# Patient Record
Sex: FEMALE | Race: WHITE | Employment: FULL TIME | URBAN - METROPOLITAN AREA
[De-identification: names, ages, dates, MRNs, and addresses within clinical notes are randomized per-mention and may not be internally consistent; named-entity substitution may affect disease eponyms.]

---

## 2019-01-24 LAB — LDL-C, EXTERNAL: 111

## 2019-01-30 ENCOUNTER — OFFICE VISIT (OUTPATIENT)
Dept: ORTHOPEDIC SURGERY | Age: 64
End: 2019-01-30

## 2019-01-30 VITALS
SYSTOLIC BLOOD PRESSURE: 123 MMHG | DIASTOLIC BLOOD PRESSURE: 78 MMHG | HEIGHT: 64 IN | BODY MASS INDEX: 27.83 KG/M2 | HEART RATE: 69 BPM | WEIGHT: 163 LBS | OXYGEN SATURATION: 97 %

## 2019-01-30 DIAGNOSIS — M79.642 HAND PAIN, LEFT: Primary | ICD-10-CM

## 2019-01-30 DIAGNOSIS — M25.511 RIGHT SHOULDER PAIN, UNSPECIFIED CHRONICITY: ICD-10-CM

## 2019-01-30 RX ORDER — DEXTROMETHORPHAN HYDROBROMIDE, GUAIFENESIN 5; 100 MG/5ML; MG/5ML
650 LIQUID ORAL EVERY 8 HOURS
COMMUNITY
End: 2020-10-16

## 2019-01-30 RX ORDER — CYCLOBENZAPRINE HCL 10 MG
TABLET ORAL AS NEEDED
COMMUNITY
End: 2021-07-29 | Stop reason: SDUPTHER

## 2019-01-30 RX ORDER — TRIAMCINOLONE ACETONIDE 40 MG/ML
40 INJECTION, SUSPENSION INTRA-ARTICULAR; INTRAMUSCULAR ONCE
Qty: 1 ML | Refills: 0
Start: 2019-01-30 | End: 2019-01-30

## 2019-01-30 RX ORDER — BETAMETHASONE SODIUM PHOSPHATE AND BETAMETHASONE ACETATE 3; 3 MG/ML; MG/ML
6 INJECTION, SUSPENSION INTRA-ARTICULAR; INTRALESIONAL; INTRAMUSCULAR; SOFT TISSUE ONCE
Qty: 1 ML | Refills: 0
Start: 2019-01-30 | End: 2019-01-30

## 2019-01-30 NOTE — PROGRESS NOTES
Patient: Kevin Huerta                MRN: [id number]       SSN: [id number]  YOB: 1955        AGE: 58 y.o. SEX: female  Body mass index is 27.98 kg/m². PCP: Gareth Ann MD  01/30/19    Chief Complaint: Left hand, right shoulder pain    HISTORY OF PRESENT ILLNESS:  Kaela Leavitt is a 58year-old female who presents to the office today with two complaints. She has left hand pain. The pain is located at the base of the thumb. It is worse when she tries to  things. It has gotten worse over the last few days and weeks. She works as a  and she has noted pain in her left hand with this. She also has right shoulder pain. She had a surgery to clean up her shoulder in the 1980s. She continues to have right shoulder pain, as well as locking and catching. She has pain with overhead activities. She has not had any recent injections. She did have an injection in her left thumb previously. Past Medical History:   Diagnosis Date    Arthritis     Hematuria     Kidney stones     Osteoporosis     UTI (urinary tract infection)        History reviewed. No pertinent family history. Current Outpatient Medications   Medication Sig Dispense Refill    cyclobenzaprine (FLEXERIL) 10 mg tablet Take  by mouth three (3) times daily as needed for Muscle Spasm(s).  acetaminophen (TYLENOL ARTHRITIS PAIN) 650 mg TbER Take 650 mg by mouth every eight (8) hours.  betamethasone (CELESTONE SOLUSPAN) 6 mg/mL injection 1 mL by Intra artICUlar route once for 1 dose. 1 mL 0    triamcinolone acetonide (KENALOG) 40 mg/mL injection 1 mL by Intra artICUlar route once for 1 dose. 1 mL 0    gabapentin (NEURONTIN) 300 mg capsule Take 300 mg by mouth three (3) times daily.  levothyroxine (SYNTHROID) 175 mcg tablet Take 175 mcg by mouth Daily (before breakfast).  conjugated estrogens (PREMARIN) 0.625 mg/gram vaginal cream Insert 0.5 g into vagina daily.       calcium carbonate (CALTREX) 600 mg (1,500 mg) tablet Take 600 mg by mouth two (2) times a day.  Dexlansoprazole (DEXILANT) 60 mg CpDB Take  by mouth.  oxybutynin (DITROPAN) 5 mg tablet Take 5 mg by mouth three (3) times daily. Allergies   Allergen Reactions    Amitriptyline Other (comments)    Aspirin Other (comments)    Demerol [Meperidine] Other (comments)       Past Surgical History:   Procedure Laterality Date    HX APPENDECTOMY      HX BLADDER SUSPENSION      HX CHOLECYSTECTOMY      HX HYSTERECTOMY         Social History     Socioeconomic History    Marital status: UNKNOWN     Spouse name: Not on file    Number of children: Not on file    Years of education: Not on file    Highest education level: Not on file   Social Needs    Financial resource strain: Not on file    Food insecurity - worry: Not on file    Food insecurity - inability: Not on file   [employer] needs - medical: Not on file   [employer] needs - non-medical: Not on file   Occupational History    Not on file   Tobacco Use    Smoking status: Never Smoker   Substance and Sexual Activity    Alcohol use: No    Drug use: Not on file    Sexual activity: Not on file   Other Topics Concern    Not on file   Social History Narrative    Not on file       REVIEW OF SYSTEMS:      CON: negative for recent weight loss/gain, fever, or chills  EYE: negative for double or blurry vision  ENT: negative for hoarseness  RS:   negative for cough, URI, SOB  CV:  negative for chest pain, palpitations  GI:    negative for blood in stool, nausea/vomiting  :  negative for blood in urine  MS: As per HPI  Other systems reviewed and noted below. PHYSICAL EXAMINATION:  Visit Vitals  /78   Pulse 69   Ht 5' 4\" (1.626 m)   Wt 163 lb (73.9 kg)   SpO2 97%   BMI 27.98 kg/m²     Body mass index is 27.98 kg/m². GENERAL: Alert and oriented x3, in no acute distress, well-developed, well-nourished.   HEENT: Normocephalic, atraumatic. RESP: Non labored breathing with equal chest rise on inspiration. CV: Well perfused extremities. No cyanosis or clubbing noted. ABDOMEN: Soft, non-tender, non-distended. PHYSICAL EXAMINATION:   Physical exam of the left hand with swelling at the base of the thumb. She is tender to palpation. She has a positive grind test.  She has crepitus with thumb range of motion. She is neurovascularly intact distally. Right shoulder examination with decreased range of motion with forward flexion only to about 120 degrees. External rotation of 30 degrees. Internal rotation to the posterolateral buttock. She has pain with this as well. Gentle rotator cuff strength testing also does give her some pain without weakness. IMAGING:   X-rays of the right shoulder and left hand were taken in the office today. The left hand shows CMC arthritis. The right shoulder shows AC arthrosis, as well as mild arthritic changes in her glenohumeral joint. ASSESSMENT/PLAN:   Juan Jacobs is a 58year-old female with left thumb CMC arthritis, as well as right shoulder osteoarthritis. We discussed treatment options at length. She would like to try injections. Both of these joints were injected. She tolerated it well. Follow up as needed.            [facility] - [facility]  OFFICE PROCEDURE PROGRESS NOTE        Chart reviewed for the following:   João Whittington MD, have reviewed the History, Physical and updated the Allergic reactions for Patricia J Colon     TIME OUT performed immediately prior to start of procedure:   IMassimo MD, have performed the following reviews on Patricia J Colon prior to the start of the procedure:            * Patient was identified by name and date of birth   * Agreement on procedure being performed was verified  * Risks and Benefits explained to the patient  * Procedure site verified and marked as necessary  * Patient was positioned for comfort  * Consent was signed and verified    Time: 1200      Date of procedure: 1/30/2019    Procedure performed by:  Magdalena Osman MD    Provider assisted by: Mir Ordonez LPN    Patient assisted by: self    How tolerated by patient: tolerated the procedure well with no complications    Post Procedural Pain Scale: 0 - No Hurt    Comments: none                  Electronically signed by: Magdalena Osman MD

## 2019-05-16 ENCOUNTER — OFFICE VISIT (OUTPATIENT)
Dept: CARDIOLOGY CLINIC | Age: 64
End: 2019-05-16

## 2019-05-16 VITALS
HEIGHT: 64 IN | WEIGHT: 158 LBS | SYSTOLIC BLOOD PRESSURE: 114 MMHG | BODY MASS INDEX: 26.98 KG/M2 | DIASTOLIC BLOOD PRESSURE: 77 MMHG | HEART RATE: 83 BPM

## 2019-05-16 DIAGNOSIS — R94.31 ABNORMAL EKG: ICD-10-CM

## 2019-05-16 DIAGNOSIS — R07.9 CHEST PAIN, UNSPECIFIED TYPE: Primary | ICD-10-CM

## 2019-05-16 DIAGNOSIS — R01.1 MURMUR, CARDIAC: ICD-10-CM

## 2019-05-16 RX ORDER — OMEPRAZOLE 20 MG/1
40 CAPSULE, DELAYED RELEASE ORAL DAILY
COMMUNITY

## 2019-05-16 RX ORDER — AMLODIPINE BESYLATE 2.5 MG/1
2.5 TABLET ORAL DAILY
Qty: 30 TAB | Refills: 1 | Status: SHIPPED | OUTPATIENT
Start: 2019-05-16 | End: 2019-06-14

## 2019-05-16 RX ORDER — ASPIRIN 81 MG/1
TABLET ORAL DAILY
COMMUNITY

## 2019-05-16 RX ORDER — NITROGLYCERIN 0.4 MG/1
0.4 TABLET SUBLINGUAL
Qty: 25 TAB | Refills: 0 | Status: SHIPPED | OUTPATIENT
Start: 2019-05-16 | End: 2020-08-03

## 2019-05-16 NOTE — PROGRESS NOTES
HISTORY OF PRESENT ILLNESS  Jeremy Patel is a 58 y.o. female. Chest Pain (Angina)    The history is provided by the patient. This is a new problem. The current episode started more than 1 week ago (11/18). The problem occurs every several days. The pain is associated with exertion. The pain is present in the substernal region. The quality of the pain is described as sharp. The pain radiates to the right arm (right chest/right back). Associated symptoms include dizziness, lower extremity edema and shortness of breath. Pertinent negatives include no claudication, no cough, no diaphoresis, no fever, no headaches, no malaise/fatigue, no nausea, no orthopnea, no palpitations, no PND and no vomiting. She has tried rest for the symptoms. The treatment provided significant relief. Leg Swelling   The history is provided by the patient. This is a chronic problem. The current episode started more than 1 week ago (2017). The problem occurs daily. Associated symptoms include chest pain and shortness of breath. Pertinent negatives include no headaches. The symptoms are aggravated by standing. The symptoms are relieved by sleep. Dizziness   The history is provided by the patient. This is a new problem. The current episode started more than 1 week ago (11/18). Associated symptoms include chest pain and shortness of breath. Pertinent negatives include no headaches. The symptoms are aggravated by standing and walking. The symptoms are relieved by rest.       Review of Systems   Constitutional: Negative for chills, diaphoresis, fever, malaise/fatigue and weight loss. HENT: Negative for nosebleeds. Eyes: Negative for discharge. Respiratory: Positive for shortness of breath. Negative for cough and wheezing. Cardiovascular: Positive for chest pain and leg swelling. Negative for palpitations, orthopnea, claudication and PND. Gastrointestinal: Negative for diarrhea, nausea and vomiting.    Genitourinary: Negative for dysuria and hematuria. Musculoskeletal: Negative for joint pain. Skin: Negative for rash. Neurological: Positive for dizziness. Negative for seizures, loss of consciousness and headaches. Endo/Heme/Allergies: Negative for polydipsia. Does not bruise/bleed easily. Psychiatric/Behavioral: Negative for depression and substance abuse. The patient does not have insomnia. Allergies   Allergen Reactions    Amitriptyline Other (comments)    Aspirin Other (comments)    Demerol [Meperidine] Other (comments)       Past Medical History:   Diagnosis Date    Arthritis     Hematuria     Kidney stones     Osteoporosis     Thyroid disease     UTI (urinary tract infection)        Family History   Problem Relation Age of Onset    Other Mother         irregular heart beat    Stroke Father 61    Stroke Sister 72    Heart Surgery Sister 76    Coronary Artery Disease Sister        Social History     Tobacco Use    Smoking status: Never Smoker    Smokeless tobacco: Never Used   Substance Use Topics    Alcohol use: No    Drug use: Never        Current Outpatient Medications   Medication Sig    omeprazole (PRILOSEC) 20 mg capsule Take 20 mg by mouth daily.  aspirin delayed-release 81 mg tablet Take  by mouth daily.  lactobacillus combination no.9 (ADULT 50+ PROBIOTIC PO) Take  by mouth.  cyclobenzaprine (FLEXERIL) 10 mg tablet Take  by mouth three (3) times daily as needed for Muscle Spasm(s).  acetaminophen (TYLENOL ARTHRITIS PAIN) 650 mg TbER Take 650 mg by mouth every eight (8) hours.  gabapentin (NEURONTIN) 300 mg capsule Take 300 mg by mouth three (3) times daily.  levothyroxine (SYNTHROID) 175 mcg tablet Take 175 mcg by mouth Daily (before breakfast).  conjugated estrogens (PREMARIN) 0.625 mg/gram vaginal cream Insert 0.5 g into vagina daily. No current facility-administered medications for this visit.          Past Surgical History:   Procedure Laterality Date    HX APPENDECTOMY      HX BLADDER SUSPENSION      HX CHOLECYSTECTOMY      HX HYSTERECTOMY         Visit Vitals  /77 (BP 1 Location: Left arm, BP Patient Position: Standing)   Pulse 83   Ht 5' 4\" (1.626 m)   Wt 71.7 kg (158 lb)   BMI 27.12 kg/m²       Diagnostic Studies:  I have reviewed the relevant tests done on the patient and show as follows  EKG tracings reviewed by me today. EKG Results     Procedure 720 Value Units Date/Time    AMB POC EKG ROUTINE W/ 12 LEADS, INTER & REP [[id number]] Resulted:  05/16/19 1331    Order Status:  Completed Updated:  05/16/19 1324        XR Results (most recent):  No results found for this or any previous visit. No flowsheet data found. Ms. Jana Blakely has a reminder for a \"due or due soon\" health maintenance. I have asked that she contact her primary care provider for follow-up on this health maintenance. Physical Exam   Constitutional: She is oriented to person, place, and time. She appears well-developed and well-nourished. No distress. HENT:   Head: Normocephalic and atraumatic. Mouth/Throat: Normal dentition. Eyes: Right eye exhibits no discharge. Left eye exhibits no discharge. No scleral icterus. Neck: Neck supple. No JVD present. Carotid bruit is not present. No thyromegaly present. Cardiovascular: Normal rate, regular rhythm, S1 normal, S2 normal and intact distal pulses. Exam reveals no gallop and no friction rub. Murmur heard. High-pitched blowing mid to late systolic murmur is present with a grade of 3/6 at the apex. Pulmonary/Chest: Effort normal and breath sounds normal. She has no wheezes. She has no rales. Abdominal: Soft. She exhibits no mass. There is no tenderness. Musculoskeletal: She exhibits no edema. Lymphadenopathy:        Right cervical: No superficial cervical adenopathy present. Left cervical: No superficial cervical adenopathy present. Neurological: She is alert and oriented to person, place, and time.    Skin: Skin is warm and dry. No rash noted. Psychiatric: She has a normal mood and affect. Her behavior is normal.       ASSESSMENT and PLAN    Patient presenting with new unstable angina. She gets fairly frequent chest pain episodes and is scared. I will order the work-up as outpatient but advised her to go to emergency room if there is any severe pain. Will add Norvasc and as needed nitroglycerin. Diagnoses and all orders for this visit:    1. Chest pain, unspecified type  -     AMB POC EKG ROUTINE W/ 12 LEADS, INTER & REP  -     ECHO ADULT COMPLETE; Future  -     NUCLEAR CARDIAC STRESS TEST; Future  -     LIPID PANEL; Future  -     METABOLIC PANEL, BASIC; Future  -     CBC W/O DIFF; Future  -     HEPATIC FUNCTION PANEL; Future  -     TSH 3RD GENERATION; Future  -     nitroglycerin (NITROSTAT) 0.4 mg SL tablet; 1 Tab by SubLINGual route every five (5) minutes as needed for Chest Pain for up to 3 doses. -     amLODIPine (NORVASC) 2.5 mg tablet; Take 1 Tab by mouth daily. 2. Murmur, cardiac  -     ECHO ADULT COMPLETE; Future    3. Abnormal EKG  -     NUCLEAR CARDIAC STRESS TEST; Future        Pertinent laboratory and test data reviewed and discussed with patient.   See patient instructions also for other medical advice given    Medications Discontinued During This Encounter   Medication Reason    calcium carbonate (CALTREX) 600 mg (1,500 mg) tablet Therapy Completed    oxybutynin (DITROPAN) 5 mg tablet Therapy Completed    Dexlansoprazole (DEXILANT) 60 mg CpDB        Follow-up and Dispositions    · Return in about 2 weeks (around 5/30/2019), or if symptoms worsen or fail to improve, for post test.

## 2019-05-16 NOTE — PATIENT INSTRUCTIONS
Medications Discontinued During This Encounter   Medication Reason    calcium carbonate (CALTREX) 600 mg (1,500 mg) tablet Therapy Completed    oxybutynin (DITROPAN) 5 mg tablet Therapy Completed    Dexlansoprazole (DEXILANT) 60 mg CpDB           A Healthy Heart: Care Instructions  Your Care Instructions    Heart disease occurs when a substance called plaque builds up in the vessels that supply oxygen-rich blood to your heart. This can narrow the blood vessels and reduce blood flow. A heart attack happens when blood flow is completely blocked. A high-fat diet, smoking, and other factors increase the risk of heart disease. Your doctor has found that you have a chance of having heart disease. You can do lots of things to keep your heart healthy. It may not be easy, but you can change your diet, exercise more, and quit smoking. These steps really work to lower your chance of heart disease. Follow-up care is a key part of your treatment and safety. Be sure to make and go to all appointments, and call your doctor if you are having problems. It's also a good idea to know your test results and keep a list of the medicines you take. How can you care for yourself at home? Diet    · Use less salt when you cook and eat. This helps lower your blood pressure. Taste food before salting. Add only a little salt when you think you need it. With time, your taste buds will adjust to less salt.     · Eat fewer snack items, fast foods, canned soups, and other high-salt, high-fat, processed foods.     · Read food labels and try to avoid saturated and trans fats. They increase your risk of heart disease by raising cholesterol levels.     · Limit the amount of solid fat-butter, margarine, and shortening-you eat. Use olive, peanut, or canola oil when you cook. Bake, broil, and steam foods instead of frying them.     · Eating fish can lower your risk for heart disease. Eat at least 2 servings of fish a week.  [place], mackerel, herring, sardines, and chunk light tuna are very good choices. These fish contain omega-3 fatty acids.     · Eat a variety of fruit and vegetables every day. Dark green, deep orange, red, or yellow fruits and vegetables are especially good for you. Examples include spinach, carrots, peaches, and berries.     · Foods high in fiber can reduce your cholesterol and provide important vitamins and minerals. High-fiber foods include whole-grain cereals and breads, oatmeal, beans, brown rice, citrus fruits, and apples.     · Limit drinks and foods with added sugar. These include candy, desserts, and soda pop.    Lifestyle changes    · If your doctor recommends it, get more exercise. Walking is a good choice. Bit by bit, increase the amount you walk every day. Try for at least 30 minutes on most days of the week. You also may want to swim, bike, or do other activities.     · Do not smoke. If you need help quitting, talk to your doctor about stop-smoking programs and medicines. These can increase your chances of quitting for good. Quitting smoking may be the most important step you can take to protect your heart. It is never too late to quit. You will get health benefits right away.     · Limit alcohol to 2 drinks a day for men and 1 drink a day for women. Too much alcohol can cause health problems. Medicines    · Take your medicines exactly as prescribed. Call your doctor if you think you are having a problem with your medicine.     · If your doctor recommends aspirin, take the amount directed each day. Make sure you take aspirin and not another kind of pain reliever, such as acetaminophen (Tylenol). If you take ibuprofen (such as Advil or Motrin) for other problems, take aspirin at least 2 hours before taking ibuprofen. When should you call for help? Call 911 if you have symptoms of a heart attack.  These may include:    · Chest pain or pressure, or a strange feeling in the chest.     · Sweating.     · Shortness of breath.     · Pain, pressure, or a strange feeling in the back, neck, jaw, or upper belly or in one or both shoulders or arms.     · Lightheadedness or sudden weakness.     · A fast or irregular heartbeat.    After you call 911, the  may tell you to chew 1 adult-strength or 2 to 4 low-dose aspirin. Wait for an ambulance. Do not try to drive yourself.   Watch closely for changes in your health, and be sure to contact your doctor if you have any problems. Where can you learn more? Go to http://louise-martine.info/. Enter R889 in the search box to learn more about \"A Healthy Heart: Care Instructions. \"  Current as of: 2018  Content Version: 11.9  © 2249-0244 [employer]. Care instructions adapted under license by [employer] (which disclaims liability or warranty for this information). If you have questions about a medical condition or this instruction, always ask your healthcare professional. [place] [address] any warranty or liability for your use of this information. Quippi Activation    Thank you for requesting access to Quippi. Please follow the instructions below to securely access and download your online medical record. Quippi allows you to send messages to your doctor, view your test results, renew your prescriptions, schedule appointments, and more. How Do I Sign Up? 1. In your internet browser, go to [email or web address]. 2. Click on the First Time User? Click Here link in the Sign In box. You will see the New Member Sign Up page. 3. Enter your Quippi Access Code exactly as it appears below. You will not need to use this code after youve completed the sign-up process. If you do not sign up before the expiration date, you must request a new code. Quippi Access Code: [email or web address]  Expires: 2019  1:56 PM (This is the date your Quippi access code will )    4.  Enter the last four digits of your Social Security Number (xxxx) and Date of Birth (mm/dd/yyyy) as indicated and click Submit. You will be taken to the next sign-up page. 5. Create a LearnUp ID. This will be your LearnUp login ID and cannot be changed, so think of one that is secure and easy to remember. 6. Create a LearnUp password. You can change your password at any time. 7. Enter your Password Reset Question and Answer. This can be used at a later time if you forget your password. 8. Enter your e-mail address. You will receive e-mail notification when new information is available in [address] 9. Click Sign Up. You can now view and download portions of your medical record. 10. Click the Download Summary menu link to download a portable copy of your medical information. Additional Information    If you have questions, please visit the Frequently Asked Questions section of the LearnUp website at [email or web address] Remember, LearnUp is NOT to be used for urgent needs. For medical emergencies, dial 911.

## 2019-06-13 LAB
A-G RATIO,AGRAT: 2 RATIO (ref 1.1–2.6)
ALBUMIN SERPL-MCNC: 4.7 G/DL (ref 3.5–5)
ALP SERPL-CCNC: 61 U/L (ref 40–120)
ALT SERPL-CCNC: 11 U/L (ref 5–40)
ANION GAP SERPL CALC-SCNC: 10.4 MMOL/L
AST SERPL W P-5'-P-CCNC: 15 U/L (ref 10–37)
BILIRUB SERPL-MCNC: 1 MG/DL (ref 0.2–1.2)
BILIRUBIN, DIRECT,CBIL: <0.2 MG/DL (ref 0–0.3)
BUN SERPL-MCNC: 17 MG/DL (ref 6–22)
CALCIUM SERPL-MCNC: 9.4 MG/DL (ref 8.4–10.5)
CHLORIDE SERPL-SCNC: 109 MMOL/L (ref 98–110)
CHOLEST SERPL-MCNC: 173 MG/DL (ref 110–200)
CO2 SERPL-SCNC: 26 MMOL/L (ref 20–32)
CREAT SERPL-MCNC: 0.5 MG/DL (ref 0.8–1.4)
ERYTHROCYTE [DISTWIDTH] IN BLOOD BY AUTOMATED COUNT: 11.5 % (ref 10–15.5)
GFRAA, 66117: >60
GFRNA, 66118: >60
GLOBULIN,GLOB: 2.4 G/DL (ref 2–4)
GLUCOSE SERPL-MCNC: 96 MG/DL (ref 70–99)
HCT VFR BLD AUTO: 39.1 % (ref 35.1–48)
HDLC SERPL-MCNC: 34 MG/DL (ref 40–59)
HDLC SERPL-MCNC: 5.1 MG/DL (ref 0–5)
HGB BLD-MCNC: 13 G/DL (ref 11.7–16)
LDLC SERPL CALC-MCNC: 125 MG/DL (ref 50–99)
MCH RBC QN AUTO: 31 PG (ref 26–34)
MCHC RBC AUTO-ENTMCNC: 33 G/DL (ref 31–36)
MCV RBC AUTO: 92 FL (ref 80–95)
PLATELET # BLD AUTO: 249 K/UL (ref 140–440)
PMV BLD AUTO: 9.5 FL (ref 9–13)
POTASSIUM SERPL-SCNC: 3.8 MMOL/L (ref 3.5–5.5)
PROT SERPL-MCNC: 7.1 G/DL (ref 6.2–8.1)
RBC # BLD AUTO: 4.25 M/UL (ref 3.8–5.2)
SODIUM SERPL-SCNC: 145 MMOL/L (ref 133–145)
TRIGL SERPL-MCNC: 71 MG/DL (ref 40–149)
TSH SERPL DL<=0.005 MIU/L-ACNC: 0.32 MCU/ML (ref 0.27–4.2)
VLDLC SERPL CALC-MCNC: 14 MG/DL (ref 8–30)
WBC # BLD AUTO: 5.1 K/UL (ref 4–11)

## 2019-06-14 ENCOUNTER — OFFICE VISIT (OUTPATIENT)
Dept: CARDIOLOGY CLINIC | Age: 64
End: 2019-06-14

## 2019-06-14 ENCOUNTER — TELEPHONE (OUTPATIENT)
Dept: CARDIOLOGY CLINIC | Age: 64
End: 2019-06-14

## 2019-06-14 VITALS
DIASTOLIC BLOOD PRESSURE: 71 MMHG | HEART RATE: 72 BPM | HEIGHT: 64 IN | WEIGHT: 155 LBS | SYSTOLIC BLOOD PRESSURE: 123 MMHG | BODY MASS INDEX: 26.46 KG/M2

## 2019-06-14 DIAGNOSIS — I34.0 NON-RHEUMATIC MITRAL REGURGITATION: Primary | ICD-10-CM

## 2019-06-14 DIAGNOSIS — E78.00 HYPERCHOLESTEREMIA: ICD-10-CM

## 2019-06-14 DIAGNOSIS — R07.9 CHEST PAIN, UNSPECIFIED TYPE: ICD-10-CM

## 2019-06-14 DIAGNOSIS — I20.8 ATYPICAL ANGINA (HCC): ICD-10-CM

## 2019-06-14 RX ORDER — MAG HYDROX/ALUMINUM HYD/SIMETH 200-200-20
SUSPENSION, ORAL (FINAL DOSE FORM) ORAL AS NEEDED
COMMUNITY
End: 2020-10-16

## 2019-06-14 RX ORDER — AMLODIPINE BESYLATE 2.5 MG/1
2.5 TABLET ORAL DAILY
Qty: 30 TAB | Refills: 1
Start: 2019-06-14 | End: 2019-07-18 | Stop reason: SDUPTHER

## 2019-06-14 RX ORDER — DICLOFENAC SODIUM 10 MG/G
GEL TOPICAL AS NEEDED
COMMUNITY
End: 2021-07-31 | Stop reason: ALTCHOICE

## 2019-06-14 RX ORDER — ATORVASTATIN CALCIUM 10 MG/1
10 TABLET, FILM COATED ORAL DAILY
Qty: 30 TAB | Refills: 5 | Status: SHIPPED | OUTPATIENT
Start: 2019-06-14 | End: 2019-11-27 | Stop reason: SDUPTHER

## 2019-06-14 NOTE — TELEPHONE ENCOUNTER
----- Message from Amber Santos MD sent at 6/14/2019 11:33 AM EDT -----  Please contact patient and do the following asap    Start Lipitor 10 mg a day  Get fasting Lipid profile and LFTs in 6 wks. Please reinforce diet and exercise.

## 2019-06-14 NOTE — PROGRESS NOTES
1. Have you been to the ER, urgent care clinic since your last visit? Hospitalized since your last visit?     no    2. Have you seen or consulted any other health care providers outside of the [address] [place] [address] since your last visit? Include any pap smears or colon screening. No     3. Since your last visit, have you had any of the following symptoms? no         4. Have you had any blood work, X-rays or cardiac testing? No         5. Where do you normally have your labs drawn? 6. Do you need any refills today?    no

## 2019-06-14 NOTE — PATIENT INSTRUCTIONS
Medications Discontinued During This Encounter   Medication Reason    lactobacillus combination no.9 (ADULT 50+ PROBIOTIC PO) Duplicate Order    amLODIPine (NORVASC) 2.5 mg tablet           Mitral Valve Regurgitation: Care Instructions  Your Care Instructions    The mitral valve lets blood flow from the upper to lower areas of the heart. Mitral valve regurgitation occurs when the valve cannot close all the way and blood backs up (regurgitates) into the upper area of the heart. This causes the heart to work harder to pump the extra blood. Mild regurgitation causes few problems. Many people have it for many years without having problems. But if the regurgitation is severe, it can weaken the heart and lead to heart failure. The causes of mitral valve regurgitation include a heart attack, heart infection (endocarditis), mitral valve prolapse, cardiomyopathy, calcium buildup in the heart, the weight-loss medicine Fen-Phen, and diseases such as lupus and rheumatoid arthritis. Some people are born with the valve problem. Your doctor may just want to watch your health closely if you have mild mitral valve regurgitation. You may take medicine to treat a problem that is causing the regurgitation. Or you may take medicine for other health problems that are caused by mitral regurgitation. For more severe disease, the valve may need to be repaired or replaced. Follow-up care is a key part of your treatment and safety. Be sure to make and go to all appointments, and call your doctor if you are having problems. It's also a good idea to know your test results and keep a list of the medicines you take. How can you care for yourself at home? · Be safe with medicines. Take your medicines exactly as prescribed. Call your doctor if you think you are having a problem with your medicine. You will get more details on the specific medicines your doctor prescribes.   · Eat heart-healthy foods such as fruits, vegetables, whole grains, fish, lean meats, and low-fat or nonfat dairy foods. Limit sodium, sugar, and alcohol. · Be active. Ask your doctor what type and level of exercise is safe for you. Walking is a good choice. You also may want to swim, bike, or do other activities. Let your doctor know if your ability to exercise changes. · Do not smoke. If you need help quitting, talk to your doctor about stop-smoking programs and medicines. These can increase your chances of quitting for good. · Stay at a healthy weight. Lose weight if you need to. · Avoid colds and flu. Get a pneumococcal vaccine shot. If you have had one before, ask your doctor if you need another dose. Get a flu vaccine every year. · Take care of your teeth and gums. Get regular dental checkups. Good dental health is important because bacteria can spread from infected teeth and gums to the heart valves. When should you call for help? Call 911 anytime you think you may need emergency care. For example, call if:    · You have severe trouble breathing.     · You cough up pink, foamy mucus.     · You have symptoms of a heart attack. These may include:  ? Chest pain or pressure, or a strange feeling in the chest.  ? Sweating. ? Shortness of breath. ? Nausea or vomiting. ? Pain, pressure, or a strange feeling in the back, neck, jaw, or upper belly or in one or both shoulders or arms. ? Lightheadedness or sudden weakness. ? A fast or irregular heartbeat. After you call 911, the  may tell you to chew 1 adult-strength or 2 to 4 low-dose aspirin. Wait for an ambulance. Do not try to drive yourself.    Call your doctor now or seek immediate medical care if:    · You have new or increased shortness of breath.     · You are dizzy or lightheaded, or you feel like you may faint.     · You have sudden weight gain, such as more than 2 to 3 pounds in a day or 5 pounds in a week.  (Your doctor may suggest a different range of weight gain.)     · You have increased swelling in your legs, ankles, or feet.     · You are suddenly so tired or weak that you cannot do your usual activities.    Watch closely for changes in your health, and be sure to contact your doctor if you develop new symptoms. Where can you learn more? Go to http://louise-martine.info/. Enter G003 in the search box to learn more about \"Mitral Valve Regurgitation: Care Instructions. \"  Current as of: 2018  Content Version: 11.9  © 7580-5280 [employer]. Care instructions adapted under license by [employer] (which disclaims liability or warranty for this information). If you have questions about a medical condition or this instruction, always ask your healthcare professional. [address] any warranty or liability for your use of this information. alike Activation    Thank you for requesting access to alike. Please follow the instructions below to securely access and download your online medical record. alike allows you to send messages to your doctor, view your test results, renew your prescriptions, schedule appointments, and more. How Do I Sign Up? 1. In your internet browser, go to [email or web address]. 2. Click on the First Time User? Click Here link in the Sign In box. You will see the New Member Sign Up page. 3. Enter your alike Access Code exactly as it appears below. You will not need to use this code after youve completed the sign-up process. If you do not sign up before the expiration date, you must request a new code. alike Access Code: [email or web address]  Expires: 2019  1:56 PM (This is the date your alike access code will )    4. Enter the last four digits of your Social Security Number (xxxx) and Date of Birth (mm/dd/yyyy) as indicated and click Submit. You will be taken to the next sign-up page. 5. Create a alike ID.  This will be your alike login ID and cannot be changed, so think of one that is secure and easy to remember. 6. Create a [employer] password. You can change your password at any time. 7. Enter your Password Reset Question and Answer. This can be used at a later time if you forget your password. 8. Enter your e-mail address. You will receive e-mail notification when new information is available in [address] 9. Click Sign Up. You can now view and download portions of your medical record. 10. Click the Download Summary menu link to download a portable copy of your medical information. Additional Information    If you have questions, please visit the Frequently Asked Questions section of the [employer] website at [email or web address] Remember, [employer] is NOT to be used for urgent needs. For medical emergencies, dial 911.

## 2019-06-14 NOTE — PROGRESS NOTES
Please contact patient and do the following asap    Start Lipitor 10 mg a day  Get fasting Lipid profile and LFTs in 6 wks. Please reinforce diet and exercise.

## 2019-06-14 NOTE — PROGRESS NOTES
HISTORY OF PRESENT ILLNESS  Magdiel Scott is a 58 y.o. female. Chest Pain (Angina)    The history is provided by the patient. This is a new problem. The current episode started more than 1 week ago (11/18). The problem has been gradually improving. The problem occurs every several days. The pain is associated with exertion. The pain is present in the substernal region. The quality of the pain is described as sharp. The pain radiates to the right arm (right chest/right back). Associated symptoms include lower extremity edema. Pertinent negatives include no claudication, no cough, no diaphoresis, no dizziness, no fever, no malaise/fatigue, no nausea, no orthopnea, no palpitations, no PND and no vomiting. She has tried rest for the symptoms. The treatment provided significant relief. Leg Swelling   The history is provided by the patient. This is a chronic problem. The current episode started more than 1 week ago (2017). The problem occurs daily. The problem has not changed since onset. Associated symptoms include chest pain. The symptoms are aggravated by standing. The symptoms are relieved by sleep. Dizziness   The history is provided by the patient. This is a new problem. The current episode started more than 1 week ago (11/18). The problem has been resolved. Associated symptoms include chest pain. The symptoms are aggravated by standing and walking. The symptoms are relieved by rest.       Review of Systems   Constitutional: Negative for chills, diaphoresis, fever, malaise/fatigue and weight loss. HENT: Negative for nosebleeds. Eyes: Negative for discharge. Respiratory: Negative for cough and wheezing. Cardiovascular: Positive for chest pain and leg swelling. Negative for palpitations, orthopnea, claudication and PND. Gastrointestinal: Negative for diarrhea, nausea and vomiting. Genitourinary: Negative for dysuria and hematuria. Musculoskeletal: Negative for joint pain.    Skin: Negative for rash.   Neurological: Negative for dizziness, seizures and loss of consciousness. Endo/Heme/Allergies: Negative for polydipsia. Does not bruise/bleed easily. Psychiatric/Behavioral: Negative for depression and substance abuse. The patient does not have insomnia. Allergies   Allergen Reactions    Amitriptyline Other (comments)    Aspirin Other (comments)    Demerol [Meperidine] Other (comments)       Past Medical History:   Diagnosis Date    Arthritis     Hematuria     Kidney stones     Osteoporosis     Thyroid disease     UTI (urinary tract infection)        Family History   Problem Relation Age of Onset    Other Mother         irregular heart beat    Stroke Father 61    Stroke Sister 72    Heart Surgery Sister 76    Coronary Artery Disease Sister        Social History     Tobacco Use    Smoking status: Never Smoker    Smokeless tobacco: Never Used   Substance Use Topics    Alcohol use: No    Drug use: Never        Current Outpatient Medications   Medication Sig    diclofenac (VOLTAREN) 1 % gel Apply  to affected area as needed.  L.acidophilus/B. bifidum,longum (PROBIOTIC COLON SUPPORT PO) Take  by mouth as needed.  hydrocortisone (HYCORT) 1 % ointment Apply  to affected area as needed for Skin Irritation. use thin layer    multivit-min/folic acid/vit K1 (MULTI FOR HER 50 PLUS PO) Take  by mouth.  omeprazole (PRILOSEC) 20 mg capsule Take 20 mg by mouth daily.  aspirin delayed-release 81 mg tablet Take  by mouth daily.  cyclobenzaprine (FLEXERIL) 10 mg tablet Take  by mouth as needed for Muscle Spasm(s).  acetaminophen (TYLENOL ARTHRITIS PAIN) 650 mg TbER Take 650 mg by mouth every eight (8) hours.  gabapentin (NEURONTIN) 300 mg capsule Take 300 mg by mouth three (3) times daily.  levothyroxine (SYNTHROID) 175 mcg tablet Take 175 mcg by mouth Daily (before breakfast).  conjugated estrogens (PREMARIN) 0.625 mg/gram vaginal cream Insert 0.5 g into vagina daily.     nitroglycerin (NITROSTAT) 0.4 mg SL tablet 1 Tab by SubLINGual route every five (5) minutes as needed for Chest Pain for up to 3 doses. No current facility-administered medications for this visit. Past Surgical History:   Procedure Laterality Date    HX APPENDECTOMY      HX BLADDER SUSPENSION      HX CHOLECYSTECTOMY      HX HYSTERECTOMY         Visit Vitals  /71   Pulse 72   Ht 5' 4\" (1.626 m)   Wt 70.3 kg (155 lb)   BMI 26.61 kg/m²       Diagnostic Studies:  I have reviewed the relevant tests done on the patient and show as follows  EKG tracings reviewed by me today. EKG Results     None        XR Results (most recent):  No results found for this or any previous visit. No flowsheet data found. 5/19 ECHO  Interpretation Summary        · Left Ventricle: Mild concentric hypertrophy. Estimated left ventricular ejection fraction is 56 - 60%. No regional wall motion abnormality noted. Mild (grade 1) left ventricular diastolic dysfunction. · Normal right ventricular size and function. · Left Atrium: Moderately dilated left atrium. · Mitral Valve: Mitral valve thickening. Mild mitral valve regurgitation. · Tricuspid Valve: Mild tricuspid valve regurgitation is present. There is no evidence of pulmonary hypertension.               Comparison Study Information     Prior Study     There is a prior study available for comparison that was performed on 5/21/2015. As compared to the previous study, there are no significant changes. 5/19 Nuclear Stress Test     Normal myocardial perfusion imaging. Myocardial perfusion imaging supports a low risk stress test.   There is a prior study available for comparison. Interpretation Summary     · Baseline ECG: Sinus rhythm, non-specific ST-T wave abnormalities. First degree AV block  · Gated SPECT: Left ventricular function post-stress was normal. Calculated ejection fraction is 72%. There is no evidence of transient ischemic dilation (TID). The TID ratio is 1.06.  · Inconclusive stress test.  · Left ventricular perfusion is probably normal.  · Myocardial perfusion imaging defect 1: There is a defect that is small to moderate in size with a mild reduction in uptake present in the apical to basal inferior location(s) that is non-reversible. There is normal wall motion in the defect area. Viability in the area is good. The defect appears to be an artifact caused by soft tissue. · Normal myocardial perfusion imaging. Myocardial perfusion imaging supports a low risk stress test.          Ms. Beata Oakes has a reminder for a \"due or due soon\" health maintenance. I have asked that she contact her primary care provider for follow-up on this health maintenance. Physical Exam   Constitutional: She is oriented to person, place, and time. She appears well-developed and well-nourished. No distress. HENT:   Head: Normocephalic and atraumatic. Mouth/Throat: Normal dentition. Eyes: Right eye exhibits no discharge. Left eye exhibits no discharge. No scleral icterus. Neck: Neck supple. No JVD present. Carotid bruit is not present. No thyromegaly present. Cardiovascular: Normal rate, regular rhythm, S1 normal, S2 normal and intact distal pulses. Exam reveals no gallop and no friction rub. Murmur heard. High-pitched blowing mid to late systolic murmur is present with a grade of 3/6 at the apex. Pulmonary/Chest: Effort normal and breath sounds normal. She has no wheezes. She has no rales. Abdominal: Soft. She exhibits no mass. There is no tenderness. Musculoskeletal: She exhibits no edema. Lymphadenopathy:        Right cervical: No superficial cervical adenopathy present. Left cervical: No superficial cervical adenopathy present. Neurological: She is alert and oriented to person, place, and time. Skin: Skin is warm and dry. No rash noted. Psychiatric: She has a normal mood and affect.  Her behavior is normal.       ASSESSMENT and PLAN    Mitral regurgitation: 5/19  mild    Chest pains do suggest atypical angina in this patient and we will treat her medically for now. Cardiac cath if symptoms continue. Advised patient to come to emergency room if chest pains are severe or do not improve with nitroglycerin. Start amlodipine as advised in the previous visit. Discussed with patient in detail regarding mitral regurgitation and will need to control the blood pressure. Start Lipitor 10 mg a day     Diagnoses and all orders for this visit:    1. Non-rheumatic mitral regurgitation    2. Chest pain, unspecified type  -     amLODIPine (NORVASC) 2.5 mg tablet; Take 1 Tab by mouth daily. 3. Atypical angina ([address]    4. Hypercholesteremia  -     atorvastatin (LIPITOR) 10 mg tablet; Take 1 Tab by mouth daily.  -     LIPID PANEL; Future  -     HEPATIC FUNCTION PANEL; Future        Pertinent laboratory and test data reviewed and discussed with patient. See patient instructions also for other medical advice given    Medications Discontinued During This Encounter   Medication Reason    lactobacillus combination no.9 (ADULT 50+ PROBIOTIC PO) Duplicate Order    amLODIPine (NORVASC) 2.5 mg tablet        Follow-up and Dispositions    · Return in about 3 months (around 9/14/2019), or if symptoms worsen or fail to improve.

## 2019-06-14 NOTE — TELEPHONE ENCOUNTER
Called and left detail message on patient voicemail regarding labs, Start Lipitor 10 mg a day. After taking medications get Lipids and LFT in 6 weeks. Please reinforce diet and exercise. If any questions to call office. Labs mailed to patient.

## 2019-07-18 DIAGNOSIS — R07.9 CHEST PAIN, UNSPECIFIED TYPE: ICD-10-CM

## 2019-07-18 RX ORDER — AMLODIPINE BESYLATE 2.5 MG/1
2.5 TABLET ORAL DAILY
Qty: 30 TAB | Refills: 3 | Status: SHIPPED | OUTPATIENT
Start: 2019-07-18 | End: 2019-12-01 | Stop reason: SDUPTHER

## 2019-07-18 NOTE — TELEPHONE ENCOUNTER
Patient called to request a refill on Norvasc 2.5 mg tablets to be sent to the [address] [place] [address] in [place], [place].

## 2019-07-19 LAB
A-G RATIO,AGRAT: 1.7 RATIO (ref 1.1–2.6)
ALBUMIN SERPL-MCNC: 4.4 G/DL (ref 3.5–5)
ALP SERPL-CCNC: 58 U/L (ref 40–120)
ALT SERPL-CCNC: 9 U/L (ref 5–40)
AST SERPL W P-5'-P-CCNC: 13 U/L (ref 10–37)
BILIRUB SERPL-MCNC: 0.8 MG/DL (ref 0.2–1.2)
BILIRUBIN, DIRECT,CBIL: <0.2 MG/DL (ref 0–0.3)
CHOLEST SERPL-MCNC: 101 MG/DL (ref 110–200)
GLOBULIN,GLOB: 2.6 G/DL (ref 2–4)
HDLC SERPL-MCNC: 3.2 MG/DL (ref 0–5)
HDLC SERPL-MCNC: 32 MG/DL (ref 40–59)
LDLC SERPL CALC-MCNC: 56 MG/DL (ref 50–99)
PROT SERPL-MCNC: 7 G/DL (ref 6.2–8.1)
TRIGL SERPL-MCNC: 67 MG/DL (ref 40–149)
VLDLC SERPL CALC-MCNC: 13 MG/DL (ref 8–30)

## 2019-10-17 ENCOUNTER — OFFICE VISIT (OUTPATIENT)
Dept: CARDIOLOGY CLINIC | Age: 64
End: 2019-10-17

## 2019-10-17 VITALS
SYSTOLIC BLOOD PRESSURE: 113 MMHG | WEIGHT: 145 LBS | HEART RATE: 68 BPM | HEIGHT: 64 IN | BODY MASS INDEX: 24.75 KG/M2 | DIASTOLIC BLOOD PRESSURE: 66 MMHG

## 2019-10-17 DIAGNOSIS — E78.00 HYPERCHOLESTEREMIA: ICD-10-CM

## 2019-10-17 DIAGNOSIS — I34.0 NON-RHEUMATIC MITRAL REGURGITATION: ICD-10-CM

## 2019-10-17 DIAGNOSIS — R07.9 CHEST PAIN, UNSPECIFIED TYPE: Primary | ICD-10-CM

## 2019-10-17 NOTE — PATIENT INSTRUCTIONS
There are no discontinued medications. Chest Pain: Care Instructions  Your Care Instructions    There are many things that can cause chest pain. Some are not serious and will get better on their own in a few days. But some kinds of chest pain need more testing and treatment. Your doctor may have recommended a follow-up visit in the next 8 to 12 hours. If you are not getting better, you may need more tests or treatment. Even though your doctor has released you, you still need to watch for any problems. The doctor carefully checked you, but sometimes problems can develop later. If you have new symptoms or if your symptoms do not get better, get medical care right away. If you have worse or different chest pain or pressure that lasts more than 5 minutes or you passed out (lost consciousness), call 911 or seek other emergency help right away. A medical visit is only one step in your treatment. Even if you feel better, you still need to do what your doctor recommends, such as going to all suggested follow-up appointments and taking medicines exactly as directed. This will help you recover and help prevent future problems. How can you care for yourself at home? · Rest until you feel better. · Take your medicine exactly as prescribed. Call your doctor if you think you are having a problem with your medicine. · Do not drive after taking a prescription pain medicine. When should you call for help? Call 911 if:    · You passed out (lost consciousness).     · You have severe difficulty breathing.     · You have symptoms of a heart attack. These may include:  ? Chest pain or pressure, or a strange feeling in your chest.  ? Sweating. ? Shortness of breath. ? Nausea or vomiting. ? Pain, pressure, or a strange feeling in your back, neck, jaw, or upper belly or in one or both shoulders or arms. ? Lightheadedness or sudden weakness. ? A fast or irregular heartbeat.   After you call 911, the  may tell you to chew 1 adult-strength or 2 to 4 low-dose aspirin. Wait for an ambulance. Do not try to drive yourself.    Call your doctor today if:    · You have any trouble breathing.     · Your chest pain gets worse.     · You are dizzy or lightheaded, or you feel like you may faint.     · You are not getting better as expected.     · You are having new or different chest pain. Where can you learn more? Go to http://louise-martine.info/. Enter A120 in the search box to learn more about \"Chest Pain: Care Instructions. \"  Current as of: 2019  Content Version: 12.2  © 8252-6647 [employer]. Care instructions adapted under license by [employer] (which disclaims liability or warranty for this information). If you have questions about a medical condition or this instruction, always ask your healthcare professional. [address] any warranty or liability for your use of this information. [employer] Activation    Thank you for requesting access to [employer]. Please follow the instructions below to securely access and download your online medical record. [employer] allows you to send messages to your doctor, view your test results, renew your prescriptions, schedule appointments, and more. How Do I Sign Up? 1. In your internet browser, go to [email or web address] 2. Click on the First Time User? Click Here link in the Sign In box. You will see the New Member Sign Up page. 3. Enter your [employer] Access Code exactly as it appears below. You will not need to use this code after youve completed the sign-up process. If you do not sign up before the expiration date, you must request a new code. [employer] Access Code: [email or web address]  Expires: 2019  1:04 PM (This is the date your [employer] access code will )    4.  Enter the last four digits of your Social Security Number (xxxx) and Date of Birth (mm/dd/yyyy) as indicated and click Submit. You will be taken to the next sign-up page. 5. Create a [employer] ID. This will be your [employer] login ID and cannot be changed, so think of one that is secure and easy to remember. 6. Create a [employer] password. You can change your password at any time. 7. Enter your Password Reset Question and Answer. This can be used at a later time if you forget your password. 8. Enter your e-mail address. You will receive e-mail notification when new information is available in [address] 9. Click Sign Up. You can now view and download portions of your medical record. 10. Click the Download Summary menu link to download a portable copy of your medical information. Additional Information    If you have questions, please visit the Frequently Asked Questions section of the [employer] website at [email or web address] Remember, [employer] is NOT to be used for urgent needs. For medical emergencies, dial 911.

## 2019-10-17 NOTE — PROGRESS NOTES
HISTORY OF PRESENT ILLNESS  Lauryn Forman is a 58 y.o. female. Chest Pain (Angina)    The history is provided by the patient. This is a recurrent problem. The current episode started more than 1 week ago (11/18). The problem has been gradually worsening (Constant for last 3 days). The problem occurs every several days. The pain is associated with rest and normal activity. The pain is present in the substernal region, lateral region and right side. The quality of the pain is described as pressure-like. The pain radiates to the right arm (right chest/right back). The symptoms are aggravated by exertion and certain positions (Sometimes may be worsening with vacuuming with right hand). Associated symptoms include lower extremity edema. Pertinent negatives include no claudication, no cough, no diaphoresis, no dizziness, no fever, no malaise/fatigue, no nausea, no orthopnea, no palpitations, no PND and no vomiting. She has tried rest for the symptoms. The treatment provided significant relief. Leg Swelling   The history is provided by the patient. This is a chronic problem. The current episode started more than 1 week ago (2017). The problem occurs daily. The problem has not changed since onset. Associated symptoms include chest pain. The symptoms are aggravated by standing. The symptoms are relieved by sleep. Dizziness   The history is provided by the patient. This is a new problem. The current episode started more than 1 week ago (11/18). The problem occurs rarely (Feels like she is falling sideways but has never fallen). Associated symptoms include chest pain. The symptoms are aggravated by standing and walking. The symptoms are relieved by rest.   Cholesterol Problem   The history is provided by the medical records. This is a chronic problem. Associated symptoms include chest pain. Review of Systems   Constitutional: Negative for chills, diaphoresis, fever, malaise/fatigue and weight loss.    HENT: Negative for nosebleeds. Eyes: Negative for discharge. Respiratory: Negative for cough and wheezing. Cardiovascular: Positive for chest pain and leg swelling. Negative for palpitations, orthopnea, claudication and PND. Gastrointestinal: Negative for diarrhea, nausea and vomiting. Genitourinary: Negative for dysuria and hematuria. Musculoskeletal: Negative for joint pain. Skin: Negative for rash. Neurological: Negative for dizziness, seizures and loss of consciousness. Endo/Heme/Allergies: Negative for polydipsia. Does not bruise/bleed easily. Psychiatric/Behavioral: Negative for depression and substance abuse. The patient does not have insomnia. Allergies   Allergen Reactions    Amitriptyline Other (comments)    Aspirin Other (comments)    Demerol [Meperidine] Other (comments)       Past Medical History:   Diagnosis Date    Arthritis     Hematuria     Kidney stones     Osteoporosis     Thyroid disease     UTI (urinary tract infection)        Family History   Problem Relation Age of Onset    Other Mother         irregular heart beat    Stroke Father 61    Stroke Sister 72    Heart Surgery Sister 76    Coronary Artery Disease Sister        Social History     Tobacco Use    Smoking status: Never Smoker    Smokeless tobacco: Never Used   Substance Use Topics    Alcohol use: No    Drug use: Never        Current Outpatient Medications   Medication Sig    amLODIPine (NORVASC) 2.5 mg tablet Take 1 Tab by mouth daily.  diclofenac (VOLTAREN) 1 % gel Apply  to affected area as needed.  L.acidophilus/B. bifidum,longum (PROBIOTIC COLON SUPPORT PO) Take  by mouth as needed.  hydrocortisone (HYCORT) 1 % ointment Apply  to affected area as needed for Skin Irritation. use thin layer    multivit-min/folic acid/vit K1 (MULTI FOR HER 50 PLUS PO) Take  by mouth.  atorvastatin (LIPITOR) 10 mg tablet Take 1 Tab by mouth daily.     omeprazole (PRILOSEC) 20 mg capsule Take 20 mg by mouth daily.    aspirin delayed-release 81 mg tablet Take  by mouth daily.  nitroglycerin (NITROSTAT) 0.4 mg SL tablet 1 Tab by SubLINGual route every five (5) minutes as needed for Chest Pain for up to 3 doses.  cyclobenzaprine (FLEXERIL) 10 mg tablet Take  by mouth as needed for Muscle Spasm(s).  acetaminophen (TYLENOL ARTHRITIS PAIN) 650 mg TbER Take 650 mg by mouth every eight (8) hours.  gabapentin (NEURONTIN) 300 mg capsule Take 300 mg by mouth three (3) times daily.  levothyroxine (SYNTHROID) 175 mcg tablet Take 175 mcg by mouth Daily (before breakfast).  conjugated estrogens (PREMARIN) 0.625 mg/gram vaginal cream Insert 0.5 g into vagina daily. No current facility-administered medications for this visit. Past Surgical History:   Procedure Laterality Date    HX APPENDECTOMY      HX BLADDER SUSPENSION      HX CHOLECYSTECTOMY      HX HYSTERECTOMY         Visit Vitals  /66   Pulse 68   Ht 5' 4\" (1.626 m)   Wt 65.8 kg (145 lb)   BMI 24.89 kg/m²       Diagnostic Studies:  I have reviewed the relevant tests done on the patient and show as follows  EKG tracings reviewed by me today. EKG Results     None        XR Results (most recent):  No results found for this or any previous visit. No flowsheet data found. 5/19 ECHO  Interpretation Summary        · Left Ventricle: Mild concentric hypertrophy. Estimated left ventricular ejection fraction is 56 - 60%. No regional wall motion abnormality noted. Mild (grade 1) left ventricular diastolic dysfunction. · Normal right ventricular size and function. · Left Atrium: Moderately dilated left atrium. · Mitral Valve: Mitral valve thickening. Mild mitral valve regurgitation. · Tricuspid Valve: Mild tricuspid valve regurgitation is present. There is no evidence of pulmonary hypertension.               Comparison Study Information     Prior Study     There is a prior study available for comparison that was performed on 5/21/2015.  As compared to the previous study, there are no significant changes. 5/19 Nuclear Stress Test     Normal myocardial perfusion imaging. Myocardial perfusion imaging supports a low risk stress test.   There is a prior study available for comparison. Interpretation Summary     · Baseline ECG: Sinus rhythm, non-specific ST-T wave abnormalities. First degree AV block  · Gated SPECT: Left ventricular function post-stress was normal. Calculated ejection fraction is 72%. There is no evidence of transient ischemic dilation (TID). The TID ratio is 1.06.  · Inconclusive stress test.  · Left ventricular perfusion is probably normal.  · Myocardial perfusion imaging defect 1: There is a defect that is small to moderate in size with a mild reduction in uptake present in the apical to basal inferior location(s) that is non-reversible. There is normal wall motion in the defect area. Viability in the area is good. The defect appears to be an artifact caused by soft tissue. · Normal myocardial perfusion imaging. Myocardial perfusion imaging supports a low risk stress test.          Ms. Gladys Deluna has a reminder for a \"due or due soon\" health maintenance. I have asked that she contact her primary care provider for follow-up on this health maintenance. Physical Exam   Constitutional: She is oriented to person, place, and time. She appears well-developed and well-nourished. No distress. HENT:   Head: Normocephalic and atraumatic. Mouth/Throat: Normal dentition. Eyes: Right eye exhibits no discharge. Left eye exhibits no discharge. No scleral icterus. Neck: Neck supple. No JVD present. Carotid bruit is not present. No thyromegaly present. Cardiovascular: Normal rate, regular rhythm, S1 normal, S2 normal and intact distal pulses. Exam reveals no gallop and no friction rub. Murmur heard. High-pitched blowing mid to late systolic murmur is present with a grade of 3/6 at the apex.   Pulmonary/Chest: Effort normal and breath sounds normal. She has no wheezes. She has no rales. Abdominal: Soft. She exhibits no mass. There is no tenderness. Musculoskeletal: She exhibits no edema. Lymphadenopathy:        Right cervical: No superficial cervical adenopathy present. Left cervical: No superficial cervical adenopathy present. Neurological: She is alert and oriented to person, place, and time. Skin: Skin is warm and dry. No rash noted. Psychiatric: She has a normal mood and affect. Her behavior is normal.       ASSESSMENT and PLAN    Mitral regurgitation: 5/19  Mild  HLD : Results for Dago Estrella (MRN [id number]) as of 10/17/2019 12:28   Ref. Range 6/13/2019 08:57 7/19/2019 08:49   Triglyceride Latest Ref Range: 40 - 149 mg/dL 71 67   Cholesterol, total Latest Ref Range: 110 - 200 mg/dL 173 101 (L)   HDL Cholesterol Latest Ref Range: 40 - 59 mg/dL 34 (L) 32 (L)   CHOLESTEROL/HDL Latest Ref Range: 0.0 - 5.0  5.1 3.2   LDL, calculated Latest Ref Range: 50 - 99 mg/dL 125 (H) 56   VLDL, calculated Latest Ref Range: 8 - 30 mg/dL 14 13     Chest pain is improved significantly in the character that was present in the past.  Essentially, that pain has resolved but the new chest discomfort described today appears to be musculoskeletal as it is on the right side and happens worse at times with vacuuming. EKG has not shown any new changes in fact minor ST-T changes in inferior lateral leads appear to be better. Lipids have shown significant improvement since starting Lipitor. I think her anginal pain is resolved and present chest pain is musculoskeletal.  We will watch her clinically. MR is clinically stable and will follow. Diagnoses and all orders for this visit:    1. Chest pain, unspecified type  -     AMB POC EKG ROUTINE W/ 12 LEADS, INTER & REP  -     AMB POC EKG ROUTINE W/ 12 LEADS, INTER & REP    2. Non-rheumatic mitral regurgitation  -     AMB POC EKG ROUTINE W/ 12 LEADS, INTER & REP    3.  Hypercholesteremia  -     AMB POC EKG ROUTINE W/ 12 LEADS, INTER & REP        Pertinent laboratory and test data reviewed and discussed with patient. See patient instructions also for other medical advice given    There are no discontinued medications. Follow-up and Dispositions    · Return in about 3 months (around 1/17/2020), or if symptoms worsen or fail to improve.

## 2019-10-17 NOTE — PROGRESS NOTES
1. Have you been to the ER, urgent care clinic since your last visit? Hospitalized since your last visit? Yes where: [facility]    2. Have you seen or consulted any other health care providers outside of the [address] [place] [address] since your last visit? Include any pap smears or colon screening. Yes Where: PCP     3. Since your last visit, have you had any of the following symptoms? chest pains. 4.  Have you had any blood work, X-rays or cardiac testing? Yes Where: [facility] Reason for visit: Labs      5. Where do you normally have your labs drawn? [facility]    6. Do you need any refills today?    No

## 2019-11-27 DIAGNOSIS — E78.00 HYPERCHOLESTEREMIA: ICD-10-CM

## 2019-11-27 RX ORDER — ATORVASTATIN CALCIUM 10 MG/1
TABLET, FILM COATED ORAL
Qty: 30 TAB | Refills: 5 | Status: SHIPPED | OUTPATIENT
Start: 2019-11-27 | End: 2020-05-20 | Stop reason: SDUPTHER

## 2019-12-01 DIAGNOSIS — R07.9 CHEST PAIN, UNSPECIFIED TYPE: ICD-10-CM

## 2019-12-02 RX ORDER — AMLODIPINE BESYLATE 2.5 MG/1
TABLET ORAL
Qty: 30 TAB | Refills: 3 | Status: SHIPPED | OUTPATIENT
Start: 2019-12-02 | End: 2020-02-20 | Stop reason: ALTCHOICE

## 2020-01-20 ENCOUNTER — OFFICE VISIT (OUTPATIENT)
Dept: CARDIOLOGY CLINIC | Age: 65
End: 2020-01-20

## 2020-01-20 VITALS
SYSTOLIC BLOOD PRESSURE: 132 MMHG | WEIGHT: 148 LBS | HEIGHT: 64 IN | HEART RATE: 70 BPM | DIASTOLIC BLOOD PRESSURE: 71 MMHG | BODY MASS INDEX: 25.27 KG/M2

## 2020-01-20 DIAGNOSIS — I34.0 NON-RHEUMATIC MITRAL REGURGITATION: ICD-10-CM

## 2020-01-20 DIAGNOSIS — E78.00 HYPERCHOLESTEREMIA: ICD-10-CM

## 2020-01-20 DIAGNOSIS — R07.9 CHEST PAIN, UNSPECIFIED TYPE: Primary | ICD-10-CM

## 2020-01-20 NOTE — PROGRESS NOTES
1. Have you been to the ER, urgent care clinic since your last visit? Hospitalized since your last visit?     no    2. Have you seen or consulted any other health care providers outside of the [address] [place] [address] since your last visit? Include any pap smears or colon screening. Yes Where: pcp     3. Since your last visit, have you had any of the following symptoms? no    4. Have you had any blood work, X-rays or cardiac testing? Yes Where: pcp     }    5. Where do you normally have your labs drawn? Dr. Bailey Crum    6. Do you need any refills today?    no

## 2020-01-20 NOTE — PATIENT INSTRUCTIONS
There are no discontinued medications. A Healthy Heart: Care Instructions Your Care Instructions Heart disease occurs when a substance called plaque builds up in the vessels that supply oxygen-rich blood to your heart. This can narrow the blood vessels and reduce blood flow. A heart attack happens when blood flow is completely blocked. A high-fat diet, smoking, and other factors increase the risk of heart disease. Your doctor has found that you have a chance of having heart disease. You can do lots of things to keep your heart healthy. It may not be easy, but you can change your diet, exercise more, and quit smoking. These steps really work to lower your chance of heart disease. Follow-up care is a key part of your treatment and safety. Be sure to make and go to all appointments, and call your doctor if you are having problems. It's also a good idea to know your test results and keep a list of the medicines you take. How can you care for yourself at home? Diet 
  · Use less salt when you cook and eat. This helps lower your blood pressure. Taste food before salting. Add only a little salt when you think you need it. With time, your taste buds will adjust to less salt.  
  · Eat fewer snack items, fast foods, canned soups, and other high-salt, high-fat, processed foods.  
  · Read food labels and try to avoid saturated and trans fats. They increase your risk of heart disease by raising cholesterol levels.  
  · Limit the amount of solid fat-butter, margarine, and shortening-you eat. Use olive, peanut, or canola oil when you cook. Bake, broil, and steam foods instead of frying them.  
  · Eating fish can lower your risk for heart disease. Eat at least 2 servings of fish a week. [place], mackerel, herring, sardines, and chunk light tuna are very good choices. These fish contain omega-3 fatty acids.  
  · Eat a variety of fruit and vegetables every day.  Dark green, deep orange, red, or yellow fruits and vegetables are especially good for you. Examples include spinach, carrots, peaches, and berries.  
  · Foods high in fiber can reduce your cholesterol and provide important vitamins and minerals. High-fiber foods include whole-grain cereals and breads, oatmeal, beans, brown rice, citrus fruits, and apples.  
  · Limit drinks and foods with added sugar. These include candy, desserts, and soda pop.  
 Lifestyle changes 
  · If your doctor recommends it, get more exercise. Walking is a good choice. Bit by bit, increase the amount you walk every day. Try for at least 30 minutes on most days of the week. You also may want to swim, bike, or do other activities.  
  · Do not smoke. If you need help quitting, talk to your doctor about stop-smoking programs and medicines. These can increase your chances of quitting for good. Quitting smoking may be the most important step you can take to protect your heart. It is never too late to quit. You will get health benefits right away.  
  · Limit alcohol to 2 drinks a day for men and 1 drink a day for women. Too much alcohol can cause health problems. Medicines 
  · Take your medicines exactly as prescribed. Call your doctor if you think you are having a problem with your medicine.  
  · If your doctor recommends aspirin, take the amount directed each day. Make sure you take aspirin and not another kind of pain reliever, such as acetaminophen (Tylenol). If you take ibuprofen (such as Advil or Motrin) for other problems, take aspirin at least 2 hours before taking ibuprofen. When should you call for help? Call 911 if you have symptoms of a heart attack. These may include: 
  · Chest pain or pressure, or a strange feeling in the chest.  
  · Sweating.  
  · Shortness of breath.  
  · Pain, pressure, or a strange feeling in the back, neck, jaw, or upper belly or in one or both shoulders or arms.  
  · Lightheadedness or sudden weakness.   · A fast or irregular heartbeat.  
 After you call 911, the  may tell you to chew 1 adult-strength or 2 to 4 low-dose aspirin. Wait for an ambulance. Do not try to drive yourself. 
 Watch closely for changes in your health, and be sure to contact your doctor if you have any problems. Where can you learn more? Go to http://louise-martine.info/. Enter G542 in the search box to learn more about \"A Healthy Heart: Care Instructions. \" Current as of: 2019 Content Version: 12.2 © 8888-0221 [employer]. Care instructions adapted under license by [employer] (which disclaims liability or warranty for this information). If you have questions about a medical condition or this instruction, always ask your healthcare professional. [address] any warranty or liability for your use of this information. Zaldiva Activation Thank you for requesting access to [employer]. Please follow the instructions below to securely access and download your online medical record. Z[employer]a allows you to send messages to your doctor, view your test results, renew your prescriptions, schedule appointments, and more. How Do I Sign Up? 1. In your internet browser, go to [email or web address]. 2. Click on the First Time User? Click Here link in the Sign In box. You will see the New Member Sign Up page. 3. Enter your Zaldiva Access Code exactly as it appears below. You will not need to use this code after youve completed the sign-up process. If you do not sign up before the expiration date, you must request a new code. Zaldiva Access Code: [email or web address] Expires: 3/5/2020  1:57 PM (This is the date your Zaldiva access code will ) 4. Enter the last four digits of your Social Security Number (xxxx) and Date of Birth (mm/dd/yyyy) as indicated and click Submit. You will be taken to the next sign-up page. 5. Create a Multiplicomt ID. This will be your Zidisha login ID and cannot be changed, so think of one that is secure and easy to remember. 6. Create a Zidisha password. You can change your password at any time. 7. Enter your Password Reset Question and Answer. This can be used at a later time if you forget your password. 8. Enter your e-mail address. You will receive e-mail notification when new information is available in [address] 9. Click Sign Up. You can now view and download portions of your medical record. 10. Click the Download Summary menu link to download a portable copy of your medical information. Additional Information If you have questions, please visit the Frequently Asked Questions section of the Zidisha website at [email or web address] Remember, Z[employer]isha is NOT to be used for urgent needs. For medical emergencies, dial 911.

## 2020-01-20 NOTE — PROGRESS NOTES
HISTORY OF PRESENT ILLNESS  Jeremy Patel is a 71 y.o. female. Cholesterol Problem   The history is provided by the medical records. This is a chronic problem. Pertinent negatives include no chest pain. Chest Pain (Angina)    The history is provided by the patient. This is a recurrent problem. The current episode started more than 1 week ago (11/18). The problem has been resolved. The problem occurs every several days. The pain is associated with rest and normal activity. The pain is present in the substernal region, lateral region and right side. The quality of the pain is described as pressure-like. The pain radiates to the right arm (right chest/right back). The symptoms are aggravated by exertion and certain positions (Sometimes may be worsening with vacuuming with right hand). Associated symptoms include lower extremity edema. Pertinent negatives include no claudication, no cough, no diaphoresis, no dizziness, no fever, no malaise/fatigue, no nausea, no orthopnea, no palpitations, no PND and no vomiting. She has tried rest for the symptoms. The treatment provided significant relief. Leg Swelling   The history is provided by the patient. This is a chronic problem. The current episode started more than 1 week ago (2017). The problem occurs daily. The problem has been gradually improving. Pertinent negatives include no chest pain. The symptoms are aggravated by standing. The symptoms are relieved by sleep. Dizziness   The history is provided by the patient. This is a new problem. The current episode started more than 1 week ago (11/18). The problem occurs rarely (Feels like she is falling sideways but has never fallen). The problem has not changed since onset. Pertinent negatives include no chest pain. The symptoms are aggravated by standing and walking. The symptoms are relieved by rest.       Review of Systems   Constitutional: Negative for chills, diaphoresis, fever, malaise/fatigue and weight loss. HENT: Negative for nosebleeds. Eyes: Negative for discharge. Respiratory: Negative for cough and wheezing. Cardiovascular: Positive for leg swelling. Negative for chest pain, palpitations, orthopnea, claudication and PND. Gastrointestinal: Negative for diarrhea, nausea and vomiting. Genitourinary: Negative for dysuria and hematuria. Musculoskeletal: Negative for joint pain. Skin: Negative for rash. Neurological: Negative for dizziness, seizures and loss of consciousness. Endo/Heme/Allergies: Negative for polydipsia. Does not bruise/bleed easily. Psychiatric/Behavioral: Negative for depression and substance abuse. The patient does not have insomnia. Allergies   Allergen Reactions    Amitriptyline Other (comments)    Aspirin Other (comments)    Demerol [Meperidine] Other (comments)       Past Medical History:   Diagnosis Date    Arthritis     Hematuria     Hypercholesteremia 10/17/2019    Kidney stones     Osteoporosis     Thyroid disease     UTI (urinary tract infection)        Family History   Problem Relation Age of Onset    Other Mother         irregular heart beat    Stroke Father 61    Stroke Sister 72    Heart Surgery Sister 76    Coronary Artery Disease Sister        Social History     Tobacco Use    Smoking status: Never Smoker    Smokeless tobacco: Never Used   Substance Use Topics    Alcohol use: No    Drug use: Never        Current Outpatient Medications   Medication Sig    amLODIPine (NORVASC) 2.5 mg tablet TAKE 1 TABLET BY MOUTH ONCE DAILY    atorvastatin (LIPITOR) 10 mg tablet TAKE 1 TABLET BY MOUTH ONCE DAILY    diclofenac (VOLTAREN) 1 % gel Apply  to affected area as needed.  L.acidophilus/B. bifidum,longum (PROBIOTIC COLON SUPPORT PO) Take  by mouth as needed.  hydrocortisone (HYCORT) 1 % ointment Apply  to affected area as needed for Skin Irritation. use thin layer    multivit-min/folic acid/vit K1 (MULTI FOR HER 50 PLUS PO) Take  by mouth.  omeprazole (PRILOSEC) 20 mg capsule Take 20 mg by mouth daily.  aspirin delayed-release 81 mg tablet Take  by mouth daily.  nitroglycerin (NITROSTAT) 0.4 mg SL tablet 1 Tab by SubLINGual route every five (5) minutes as needed for Chest Pain for up to 3 doses.  cyclobenzaprine (FLEXERIL) 10 mg tablet Take  by mouth as needed for Muscle Spasm(s).  acetaminophen (TYLENOL ARTHRITIS PAIN) 650 mg TbER Take 650 mg by mouth every eight (8) hours.  gabapentin (NEURONTIN) 300 mg capsule Take 300 mg by mouth two (2) times a day.  levothyroxine (SYNTHROID) 175 mcg tablet Take 175 mcg by mouth Daily (before breakfast).  conjugated estrogens (PREMARIN) 0.625 mg/gram vaginal cream Insert 0.5 g into vagina daily. No current facility-administered medications for this visit. Past Surgical History:   Procedure Laterality Date    HX APPENDECTOMY      HX BLADDER SUSPENSION      HX CHOLECYSTECTOMY      HX HYSTERECTOMY         Visit Vitals  /71   Pulse 70   Ht 5' 4\" (1.626 m)   Wt 67.1 kg (148 lb)   BMI 25.40 kg/m²       Diagnostic Studies:  I have reviewed the relevant tests done on the patient and show as follows  EKG tracings reviewed by me today. EKG Results     None        XR Results (most recent):  No results found for this or any previous visit. No flowsheet data found. 5/19 ECHO  Interpretation Summary        · Left Ventricle: Mild concentric hypertrophy. Estimated left ventricular ejection fraction is 56 - 60%. No regional wall motion abnormality noted. Mild (grade 1) left ventricular diastolic dysfunction. · Normal right ventricular size and function. · Left Atrium: Moderately dilated left atrium. · Mitral Valve: Mitral valve thickening. Mild mitral valve regurgitation. · Tricuspid Valve: Mild tricuspid valve regurgitation is present.  There is no evidence of pulmonary hypertension.               Comparison Study Information     Prior Study     There is a prior study available for comparison that was performed on 5/21/2015. As compared to the previous study, there are no significant changes. 5/19 Nuclear Stress Test     Normal myocardial perfusion imaging. Myocardial perfusion imaging supports a low risk stress test.   There is a prior study available for comparison. Interpretation Summary     · Baseline ECG: Sinus rhythm, non-specific ST-T wave abnormalities. First degree AV block  · Gated SPECT: Left ventricular function post-stress was normal. Calculated ejection fraction is 72%. There is no evidence of transient ischemic dilation (TID). The TID ratio is 1.06.  · Inconclusive stress test.  · Left ventricular perfusion is probably normal.  · Myocardial perfusion imaging defect 1: There is a defect that is small to moderate in size with a mild reduction in uptake present in the apical to basal inferior location(s) that is non-reversible. There is normal wall motion in the defect area. Viability in the area is good. The defect appears to be an artifact caused by soft tissue. · Normal myocardial perfusion imaging. Myocardial perfusion imaging supports a low risk stress test.          Ms. Oren Pacheco has a reminder for a \"due or due soon\" health maintenance. I have asked that she contact her primary care provider for follow-up on this health maintenance. Physical Exam   Constitutional: She is oriented to person, place, and time. She appears well-developed and well-nourished. No distress. HENT:   Head: Normocephalic and atraumatic. Mouth/Throat: Normal dentition. Eyes: Right eye exhibits no discharge. Left eye exhibits no discharge. No scleral icterus. Neck: Neck supple. No JVD present. Carotid bruit is not present. No thyromegaly present. Cardiovascular: Normal rate, regular rhythm, S1 normal, S2 normal and intact distal pulses. Exam reveals no gallop and no friction rub. Murmur heard.   High-pitched blowing mid to late systolic murmur is present with a grade of 3/6 at the apex. Pulmonary/Chest: Effort normal and breath sounds normal. She has no wheezes. She has no rales. Abdominal: Soft. She exhibits no mass. There is no tenderness. Musculoskeletal:         General: No edema. Lymphadenopathy:        Right cervical: No superficial cervical adenopathy present. Left cervical: No superficial cervical adenopathy present. Neurological: She is alert and oriented to person, place, and time. Skin: Skin is warm and dry. No rash noted. Psychiatric: She has a normal mood and affect. Her behavior is normal.       ASSESSMENT and PLAN    Mitral regurgitation: 5/19  Mild  HLD : Results for Mike Medina (MRN [id number]) as of 10/17/2019 12:28   Ref. Range 6/13/2019 08:57 7/19/2019 08:49   Triglyceride Latest Ref Range: 40 - 149 mg/dL 71 67   Cholesterol, total Latest Ref Range: 110 - 200 mg/dL 173 101 (L)   HDL Cholesterol Latest Ref Range: 40 - 59 mg/dL 34 (L) 32 (L)   CHOLESTEROL/HDL Latest Ref Range: 0.0 - 5.0  5.1 3.2   LDL, calculated Latest Ref Range: 50 - 99 mg/dL 125 (H) 56   VLDL, calculated Latest Ref Range: 8 - 30 mg/dL 14 13     Chest pain is improved significantly in the character that was present in the past.  It improved with the addition of Norvasc so it may be angina which is stable now. Patient has cough and tells me today that she had a history of nodules in the lungs in the past.  I recommended that she should see her PCP ASAP and follow-up on the nodules. Lipids have shown significant improvement since starting Lipitor. I think her anginal pain is resolved. We will watch her clinically. MR is clinically stable and will follow. Diagnoses and all orders for this visit:    1. Chest pain, unspecified type    2. Hypercholesteremia    3. Non-rheumatic mitral regurgitation        Pertinent laboratory and test data reviewed and discussed with patient.   See patient instructions also for other medical advice given    There are no discontinued medications. Follow-up and Dispositions    · Return in about 1 year (around 1/20/2021), or if symptoms worsen or fail to improve, for with ekg.

## 2020-02-20 ENCOUNTER — OFFICE VISIT (OUTPATIENT)
Dept: CARDIOLOGY CLINIC | Age: 65
End: 2020-02-20

## 2020-02-20 VITALS
HEART RATE: 69 BPM | WEIGHT: 150 LBS | SYSTOLIC BLOOD PRESSURE: 100 MMHG | HEIGHT: 64 IN | BODY MASS INDEX: 25.61 KG/M2 | DIASTOLIC BLOOD PRESSURE: 62 MMHG

## 2020-02-20 DIAGNOSIS — R07.9 CHEST PAIN, UNSPECIFIED TYPE: Primary | ICD-10-CM

## 2020-02-20 DIAGNOSIS — E78.00 HYPERCHOLESTEREMIA: ICD-10-CM

## 2020-02-20 DIAGNOSIS — R94.31 ABNORMAL EKG: ICD-10-CM

## 2020-02-20 DIAGNOSIS — I34.0 NON-RHEUMATIC MITRAL REGURGITATION: ICD-10-CM

## 2020-02-20 NOTE — PROGRESS NOTES
HISTORY OF PRESENT ILLNESS  Dean Hart is a 71 y.o. female. Cholesterol Problem   The history is provided by the medical records. This is a chronic problem. Associated symptoms include chest pain. Chest Pain (Angina)    The history is provided by the patient. This is a recurrent problem. The current episode started more than 1 week ago (11/18). The problem has been gradually worsening (Was in ER on 2/10/2020-negative stress test). The problem occurs daily. The pain is associated with rest and normal activity. The pain is present in the substernal region, lateral region and right side. The quality of the pain is described as pressure-like. The pain radiates to the right arm (right chest/right back). The symptoms are aggravated by exertion and certain positions (Sometimes may be worsening with vacuuming with right hand). Associated symptoms include lower extremity edema. Pertinent negatives include no claudication, no cough, no diaphoresis, no dizziness, no fever, no malaise/fatigue, no nausea, no orthopnea, no palpitations, no PND and no vomiting. She has tried rest for the symptoms. The treatment provided significant relief. Leg Swelling   The history is provided by the patient. This is a chronic problem. The current episode started more than 1 week ago (2017). The problem occurs daily. The problem has not changed since onset. Associated symptoms include chest pain. The symptoms are aggravated by standing. The symptoms are relieved by sleep. Dizziness   The history is provided by the patient. This is a new problem. The current episode started more than 1 week ago (11/18). The problem occurs rarely (Feels like she is falling sideways but has never fallen). The problem has not changed since onset. Associated symptoms include chest pain. The symptoms are aggravated by standing and walking.  The symptoms are relieved by rest.   Hospital Follow Up   The history is provided by the patient and medical records (cp). Associated symptoms include chest pain. Hypertension   The history is provided by the medical records. This is a chronic problem. Associated symptoms include chest pain. Review of Systems   Constitutional: Negative for chills, diaphoresis, fever, malaise/fatigue and weight loss. HENT: Negative for nosebleeds. Eyes: Negative for discharge. Respiratory: Negative for cough and wheezing. Cardiovascular: Positive for chest pain and leg swelling. Negative for palpitations, orthopnea, claudication and PND. Gastrointestinal: Negative for diarrhea, nausea and vomiting. Genitourinary: Negative for dysuria and hematuria. Musculoskeletal: Negative for joint pain. Skin: Negative for rash. Neurological: Negative for dizziness, seizures and loss of consciousness. Endo/Heme/Allergies: Negative for polydipsia. Does not bruise/bleed easily. Psychiatric/Behavioral: Negative for depression and substance abuse. The patient does not have insomnia. Allergies   Allergen Reactions    Amitriptyline Other (comments)    Aspirin Other (comments)    Demerol [Meperidine] Other (comments)       Past Medical History:   Diagnosis Date    Arthritis     Hematuria     Hypercholesteremia 10/17/2019    Kidney stones     Osteoporosis     Thyroid disease     UTI (urinary tract infection)        Family History   Problem Relation Age of Onset    Other Mother         irregular heart beat    Stroke Father 61    Stroke Sister 72    Heart Surgery Sister 76    Coronary Artery Disease Sister        Social History     Tobacco Use    Smoking status: Never Smoker    Smokeless tobacco: Never Used   Substance Use Topics    Alcohol use: No    Drug use: Never        Current Outpatient Medications   Medication Sig    Cholestyramine-Aspartame (PREVALITE) 4 gram powder Take  by mouth three (3) times daily (with meals).     amLODIPine (NORVASC) 2.5 mg tablet TAKE 1 TABLET BY MOUTH ONCE DAILY    atorvastatin (LIPITOR) 10 mg tablet TAKE 1 TABLET BY MOUTH ONCE DAILY    diclofenac (VOLTAREN) 1 % gel Apply  to affected area as needed.  L.acidophilus/B. bifidum,longum (PROBIOTIC COLON SUPPORT PO) Take  by mouth as needed.  hydrocortisone (HYCORT) 1 % ointment Apply  to affected area as needed for Skin Irritation. use thin layer    multivit-min/folic acid/vit K1 (MULTI FOR HER 50 PLUS PO) Take  by mouth.  omeprazole (PRILOSEC) 20 mg capsule Take 20 mg by mouth daily.  aspirin delayed-release 81 mg tablet Take  by mouth daily.  nitroglycerin (NITROSTAT) 0.4 mg SL tablet 1 Tab by SubLINGual route every five (5) minutes as needed for Chest Pain for up to 3 doses.  cyclobenzaprine (FLEXERIL) 10 mg tablet Take  by mouth as needed for Muscle Spasm(s).  acetaminophen (TYLENOL ARTHRITIS PAIN) 650 mg TbER Take 650 mg by mouth every eight (8) hours.  gabapentin (NEURONTIN) 300 mg capsule Take 300 mg by mouth three (3) times daily.  levothyroxine (SYNTHROID) 175 mcg tablet Take 175 mcg by mouth Daily (before breakfast).  conjugated estrogens (PREMARIN) 0.625 mg/gram vaginal cream Insert 0.5 g into vagina daily. No current facility-administered medications for this visit. Past Surgical History:   Procedure Laterality Date    HX APPENDECTOMY      HX BLADDER SUSPENSION      HX CHOLECYSTECTOMY      HX HYSTERECTOMY         Visit Vitals  /62   Pulse 69   Ht 5' 4\" (1.626 m)   Wt 68 kg (150 lb)   BMI 25.75 kg/m²       Diagnostic Studies:  I have reviewed the relevant tests done on the patient and show as follows  EKG tracings reviewed by me today. EKG Results     None        XR Results (most recent):  No results found for this or any previous visit. No flowsheet data found. 5/19 ECHO  Interpretation Summary        · Left Ventricle: Mild concentric hypertrophy. Estimated left ventricular ejection fraction is 56 - 60%. No regional wall motion abnormality noted.  Mild (grade 1) left ventricular diastolic dysfunction. · Normal right ventricular size and function. · Left Atrium: Moderately dilated left atrium. · Mitral Valve: Mitral valve thickening. Mild mitral valve regurgitation. · Tricuspid Valve: Mild tricuspid valve regurgitation is present. There is no evidence of pulmonary hypertension.               Comparison Study Information     Prior Study     There is a prior study available for comparison that was performed on 5/21/2015. As compared to the previous study, there are no significant changes. 5/19 Nuclear Stress Test     Normal myocardial perfusion imaging. Myocardial perfusion imaging supports a low risk stress test.   There is a prior study available for comparison. Interpretation Summary     · Baseline ECG: Sinus rhythm, non-specific ST-T wave abnormalities. First degree AV block  · Gated SPECT: Left ventricular function post-stress was normal. Calculated ejection fraction is 72%. There is no evidence of transient ischemic dilation (TID). The TID ratio is 1.06.  · Inconclusive stress test.  · Left ventricular perfusion is probably normal.  · Myocardial perfusion imaging defect 1: There is a defect that is small to moderate in size with a mild reduction in uptake present in the apical to basal inferior location(s) that is non-reversible. There is normal wall motion in the defect area. Viability in the area is good. The defect appears to be an artifact caused by soft tissue. · Normal myocardial perfusion imaging. Myocardial perfusion imaging supports a low risk stress test.          Ms. Jacinto Muir has a reminder for a \"due or due soon\" health maintenance. I have asked that she contact her primary care provider for follow-up on this health maintenance. Physical Exam   Constitutional: She is oriented to person, place, and time. She appears well-developed and well-nourished. No distress. HENT:   Head: Normocephalic and atraumatic. Mouth/Throat: Normal dentition.    Eyes: Right eye exhibits no discharge. Left eye exhibits no discharge. No scleral icterus. Neck: Neck supple. No JVD present. Carotid bruit is not present. No thyromegaly present. Cardiovascular: Normal rate, regular rhythm, S1 normal, S2 normal and intact distal pulses. Exam reveals no gallop and no friction rub. Murmur heard. High-pitched blowing mid to late systolic murmur is present with a grade of 3/6 at the apex. Pulmonary/Chest: Effort normal and breath sounds normal. She has no wheezes. She has no rales. Abdominal: Soft. She exhibits no mass. There is no abdominal tenderness. Musculoskeletal:         General: No edema. Lymphadenopathy:        Right cervical: No superficial cervical adenopathy present. Left cervical: No superficial cervical adenopathy present. Neurological: She is alert and oriented to person, place, and time. Skin: Skin is warm and dry. No rash noted. Psychiatric: She has a normal mood and affect. Her behavior is normal.       ASSESSMENT and PLAN    Mitral regurgitation: 5/19  Mild  HLD : Results for Edna Feliz (MRN [id number]) as of 10/17/2019 12:28   Ref. Range 6/13/2019 08:57 7/19/2019 08:49   Triglyceride Latest Ref Range: 40 - 149 mg/dL 71 67   Cholesterol, total Latest Ref Range: 110 - 200 mg/dL 173 101 (L)   HDL Cholesterol Latest Ref Range: 40 - 59 mg/dL 34 (L) 32 (L)   CHOLESTEROL/HDL Latest Ref Range: 0.0 - 5.0  5.1 3.2   LDL, calculated Latest Ref Range: 50 - 99 mg/dL 125 (H) 56   VLDL, calculated Latest Ref Range: 8 - 30 mg/dL 14 13     Chest pain recurred despite Norvasc. She spent a day in [facility] through ER and had a stress nuclear stress test repeated which is reported completely normal.  Discontinue Norvasc for now and use nitroglycerin as needed. They have given cholestyramine for GI reasons.   We will see how she responds in her symptoms with that and if this chest pains keep happening, cardiac catheterization may need to be done to rule out any CAD for sure.  Lipids have shown significant improvement since starting Lipitor. MR is clinically stable and will follow. Diagnoses and all orders for this visit:    1. Chest pain, unspecified type    2. Hypercholesteremia    3. Non-rheumatic mitral regurgitation    4. Abnormal EKG        Pertinent laboratory and test data reviewed and discussed with patient. See patient instructions also for other medical advice given    Medications Discontinued During This Encounter   Medication Reason    amLODIPine (NORVASC) 2.5 mg tablet Therapy Completed       Follow-up and Dispositions    · Return in about 3 months (around 5/20/2020), or if symptoms worsen or fail to improve.

## 2020-02-20 NOTE — PATIENT INSTRUCTIONS
Medications Discontinued During This Encounter   Medication Reason    amLODIPine (NORVASC) 2.5 mg tablet Therapy Completed     After the recommended changes have been made in blood pressure medicines, patient advised to keep BP/HR(pulse rate) chart twice daily and bring us results in next 2 weeks or so. Patient may send the results via \"My Chart\" if desired. Please rest for 5-10 minutes before checking blood pressure       Chest Pain: Care Instructions  Your Care Instructions    There are many things that can cause chest pain. Some are not serious and will get better on their own in a few days. But some kinds of chest pain need more testing and treatment. Your doctor may have recommended a follow-up visit in the next 8 to 12 hours. If you are not getting better, you may need more tests or treatment. Even though your doctor has released you, you still need to watch for any problems. The doctor carefully checked you, but sometimes problems can develop later. If you have new symptoms or if your symptoms do not get better, get medical care right away. If you have worse or different chest pain or pressure that lasts more than 5 minutes or you passed out (lost consciousness), call 911 or seek other emergency help right away. A medical visit is only one step in your treatment. Even if you feel better, you still need to do what your doctor recommends, such as going to all suggested follow-up appointments and taking medicines exactly as directed. This will help you recover and help prevent future problems. How can you care for yourself at home? · Rest until you feel better. · Take your medicine exactly as prescribed. Call your doctor if you think you are having a problem with your medicine. · Do not drive after taking a prescription pain medicine. When should you call for help?   Call 911 if:    · You passed out (lost consciousness).     · You have severe difficulty breathing.     · You have symptoms of a heart attack. These may include:  ? Chest pain or pressure, or a strange feeling in your chest.  ? Sweating. ? Shortness of breath. ? Nausea or vomiting. ? Pain, pressure, or a strange feeling in your back, neck, jaw, or upper belly or in one or both shoulders or arms. ? Lightheadedness or sudden weakness. ? A fast or irregular heartbeat. After you call 911, the  may tell you to chew 1 adult-strength or 2 to 4 low-dose aspirin. Wait for an ambulance. Do not try to drive yourself.    Call your doctor today if:    · You have any trouble breathing.     · Your chest pain gets worse.     · You are dizzy or lightheaded, or you feel like you may faint.     · You are not getting better as expected.     · You are having new or different chest pain. Where can you learn more? Go to http://louise-martine.info/. Enter A120 in the search box to learn more about \"Chest Pain: Care Instructions. \"  Current as of: June 26, 2019  Content Version: 12.2  © 3834-5883 [employer]. Care instructions adapted under license by [employer] (which disclaims liability or warranty for this information). If you have questions about a medical condition or this instruction, always ask your healthcare professional. [address] any warranty or liability for your use of this information. Zuora Activation    Thank you for requesting access to Zuora. Please follow the instructions below to securely access and download your online medical record. Zuora allows you to send messages to your doctor, view your test results, renew your prescriptions, schedule appointments, and more. How Do I Sign Up? 1. In your internet browser, go to [email or web address]. 2. Click on the First Time User? Click Here link in the Sign In box. You will see the New Member Sign Up page. 3. Enter your Zuora Access Code exactly as it appears below.  You will not need to use this code after youve completed the sign-up process. If you do not sign up before the expiration date, you must request a new code. [employer] Access Code: [email or web address]  Expires: 3/5/2020  1:57 PM (This is the date your [employer] access code will )    4. Enter the last four digits of your Social Security Number (xxxx) and Date of Birth (mm/dd/yyyy) as indicated and click Submit. You will be taken to the next sign-up page. 5. Create a [employer] ID. This will be your [employer] login ID and cannot be changed, so think of one that is secure and easy to remember. 6. Create a [employer] password. You can change your password at any time. 7. Enter your Password Reset Question and Answer. This can be used at a later time if you forget your password. 8. Enter your e-mail address. You will receive e-mail notification when new information is available in [address]. 9. Click Sign Up. You can now view and download portions of your medical record. 10. Click the Download Summary menu link to download a portable copy of your medical information. Additional Information    If you have questions, please visit the Frequently Asked Questions section of the [employer] website at [email or web address] Remember, [employer] is NOT to be used for urgent needs. For medical emergencies, dial 911.

## 2020-05-20 DIAGNOSIS — E78.00 HYPERCHOLESTEREMIA: ICD-10-CM

## 2020-05-20 RX ORDER — ATORVASTATIN CALCIUM 10 MG/1
10 TABLET, FILM COATED ORAL DAILY
Qty: 90 TAB | Refills: 3 | Status: SHIPPED | OUTPATIENT
Start: 2020-05-20 | End: 2021-06-01 | Stop reason: SDUPTHER

## 2020-08-03 ENCOUNTER — OFFICE VISIT (OUTPATIENT)
Dept: CARDIOLOGY CLINIC | Age: 65
End: 2020-08-03

## 2020-08-03 VITALS
BODY MASS INDEX: 25.61 KG/M2 | SYSTOLIC BLOOD PRESSURE: 108 MMHG | DIASTOLIC BLOOD PRESSURE: 61 MMHG | WEIGHT: 150 LBS | HEIGHT: 64 IN | HEART RATE: 62 BPM | TEMPERATURE: 98 F

## 2020-08-03 DIAGNOSIS — E78.00 HYPERCHOLESTEREMIA: ICD-10-CM

## 2020-08-03 DIAGNOSIS — I34.0 NONRHEUMATIC MITRAL VALVE REGURGITATION: ICD-10-CM

## 2020-08-03 DIAGNOSIS — R07.2 PRECORDIAL PAIN: Primary | ICD-10-CM

## 2020-08-03 DIAGNOSIS — I34.0 NON-RHEUMATIC MITRAL REGURGITATION: ICD-10-CM

## 2020-08-03 RX ORDER — POLYETHYLENE GLYCOL 3350 17 G/17G
17 POWDER, FOR SOLUTION ORAL 2 TIMES DAILY
COMMUNITY

## 2020-08-03 RX ORDER — FLUOCINONIDE 0.5 MG/G
OINTMENT TOPICAL 2 TIMES DAILY
COMMUNITY

## 2020-08-03 RX ORDER — BUDESONIDE AND FORMOTEROL FUMARATE DIHYDRATE 160; 4.5 UG/1; UG/1
2 AEROSOL RESPIRATORY (INHALATION)
COMMUNITY

## 2020-08-03 RX ORDER — NAPROXEN SODIUM 220 MG
220 TABLET ORAL 2 TIMES DAILY WITH MEALS
Qty: 10 TAB | Refills: 0
Start: 2020-08-03 | End: 2020-10-16

## 2020-08-03 NOTE — PROGRESS NOTES
HISTORY OF PRESENT ILLNESS  Bradley Stevens is a 71 y.o. female. Hypertension   The history is provided by the medical records. This is a chronic problem. Associated symptoms include chest pain. Cholesterol Problem   The history is provided by the medical records. This is a chronic problem. Associated symptoms include chest pain. Chest Pain (Angina)    The history is provided by the patient. This is a recurrent problem. The current episode started more than 1 week ago (11/18). The problem has not changed (Was in ER on 2/10/2020-negative stress test) since onset. Duration of episode(s) is 2 days. The problem occurs every several days (2/wk). The pain is associated with rest and normal activity. The pain is present in the substernal region, lateral region and right side. The quality of the pain is described as pressure-like. The pain radiates to the right arm (right chest/right back). The symptoms are aggravated by exertion and certain positions (Sometimes may be worsening with vacuuming with right hand). Associated symptoms include lower extremity edema. Pertinent negatives include no claudication, no cough, no diaphoresis, no dizziness, no fever, no malaise/fatigue, no nausea, no orthopnea, no palpitations, no PND and no vomiting. She has tried rest and NSAIDs for the symptoms. The treatment provided moderate relief. Leg Swelling   The history is provided by the patient. This is a chronic problem. The current episode started more than 1 week ago (2017). The problem occurs daily. The problem has not changed since onset. Associated symptoms include chest pain. The symptoms are aggravated by standing. The symptoms are relieved by sleep. Dizziness   The history is provided by the patient. This is a new problem. The current episode started more than 1 week ago (11/18). The problem occurs rarely (Feels like she is falling sideways but has never fallen). The problem has been resolved.  Associated symptoms include chest pain. The symptoms are aggravated by standing and walking. The symptoms are relieved by rest.       Review of Systems   Constitutional: Negative for chills, diaphoresis, fever, malaise/fatigue and weight loss. HENT: Negative for nosebleeds. Eyes: Negative for discharge. Respiratory: Negative for cough and wheezing. Cardiovascular: Positive for chest pain and leg swelling. Negative for palpitations, orthopnea, claudication and PND. Gastrointestinal: Negative for diarrhea, nausea and vomiting. Genitourinary: Negative for dysuria and hematuria. Musculoskeletal: Negative for joint pain. Skin: Negative for rash. Neurological: Negative for dizziness, seizures and loss of consciousness. Endo/Heme/Allergies: Negative for polydipsia. Does not bruise/bleed easily. Psychiatric/Behavioral: Negative for depression and substance abuse. The patient does not have insomnia. Allergies   Allergen Reactions    Amitriptyline Other (comments)    Aspirin Other (comments)    Demerol [Meperidine] Other (comments)       Past Medical History:   Diagnosis Date    Arthritis     Hematuria     Hypercholesteremia 10/17/2019    Kidney stones     Osteoporosis     Thyroid disease     UTI (urinary tract infection)        Family History   Problem Relation Age of Onset    Other Mother         irregular heart beat    Stroke Father 61    Stroke Sister 72    Heart Surgery Sister 76    Coronary Artery Disease Sister        Social History     Tobacco Use    Smoking status: Never Smoker    Smokeless tobacco: Never Used   Substance Use Topics    Alcohol use: No    Drug use: Never        Current Outpatient Medications   Medication Sig    budesonide-formoteroL (Symbicort) 160-4.5 mcg/actuation HFAA Take 2 Puffs by inhalation.  polyethylene glycol (Miralax) 17 gram/dose powder Take 17 g by mouth two (2) times a day.  fluocinoNIDE (LIDEX) 0.05 % ointment Apply  to affected area two (2) times a day.     atorvastatin (LIPITOR) 10 mg tablet Take 1 Tab by mouth daily.  diclofenac (VOLTAREN) 1 % gel Apply  to affected area as needed.  L.acidophilus/B. bifidum,longum (PROBIOTIC COLON SUPPORT PO) Take  by mouth as needed.  hydrocortisone (HYCORT) 1 % ointment Apply  to affected area as needed for Skin Irritation. use thin layer    multivit-min/folic acid/vit K1 (MULTI FOR HER 50 PLUS PO) Take  by mouth.  omeprazole (PRILOSEC) 20 mg capsule Take 20 mg by mouth daily.  aspirin delayed-release 81 mg tablet Take  by mouth daily.  cyclobenzaprine (FLEXERIL) 10 mg tablet Take  by mouth as needed for Muscle Spasm(s).  acetaminophen (TYLENOL ARTHRITIS PAIN) 650 mg TbER Take 650 mg by mouth every eight (8) hours.  gabapentin (NEURONTIN) 300 mg capsule Take 300 mg by mouth three (3) times daily.  levothyroxine (Synthroid) 75 mcg tablet Take 75 mcg by mouth Daily (before breakfast).  conjugated estrogens (PREMARIN) 0.625 mg/gram vaginal cream Insert 0.5 g into vagina daily. No current facility-administered medications for this visit. Past Surgical History:   Procedure Laterality Date    HX APPENDECTOMY      HX BLADDER SUSPENSION      HX CHOLECYSTECTOMY      HX HYSTERECTOMY         Visit Vitals  /61   Pulse 62   Temp 98 °F (36.7 °C) (Temporal)   Ht 5' 4\" (1.626 m)   Wt 68 kg (150 lb)   BMI 25.75 kg/m²       Diagnostic Studies:  I have reviewed the relevant tests done on the patient and show as follows  EKG tracings reviewed by me today. EKG Results     None        XR Results (most recent):  No results found for this or any previous visit. No flowsheet data found. 5/19 ECHO  Interpretation Summary        · Left Ventricle: Mild concentric hypertrophy. Estimated left ventricular ejection fraction is 56 - 60%. No regional wall motion abnormality noted. Mild (grade 1) left ventricular diastolic dysfunction. · Normal right ventricular size and function.   · Left Atrium: Moderately dilated left atrium. · Mitral Valve: Mitral valve thickening. Mild mitral valve regurgitation. · Tricuspid Valve: Mild tricuspid valve regurgitation is present. There is no evidence of pulmonary hypertension.               Comparison Study Information     Prior Study     There is a prior study available for comparison that was performed on 5/21/2015. As compared to the previous study, there are no significant changes. 5/19 Nuclear Stress Test     Normal myocardial perfusion imaging. Myocardial perfusion imaging supports a low risk stress test.   There is a prior study available for comparison. Interpretation Summary     · Baseline ECG: Sinus rhythm, non-specific ST-T wave abnormalities. First degree AV block  · Gated SPECT: Left ventricular function post-stress was normal. Calculated ejection fraction is 72%. There is no evidence of transient ischemic dilation (TID). The TID ratio is 1.06.  · Inconclusive stress test.  · Left ventricular perfusion is probably normal.  · Myocardial perfusion imaging defect 1: There is a defect that is small to moderate in size with a mild reduction in uptake present in the apical to basal inferior location(s) that is non-reversible. There is normal wall motion in the defect area. Viability in the area is good. The defect appears to be an artifact caused by soft tissue. · Normal myocardial perfusion imaging. Myocardial perfusion imaging supports a low risk stress test.          Ms. Jossue Matta has a reminder for a \"due or due soon\" health maintenance. I have asked that she contact her primary care provider for follow-up on this health maintenance. Physical Exam   Constitutional: She is oriented to person, place, and time. She appears well-developed and well-nourished. No distress. HENT:   Head: Normocephalic and atraumatic. Mouth/Throat: Normal dentition. Eyes: Right eye exhibits no discharge. Left eye exhibits no discharge. No scleral icterus.    Neck: Neck supple. No JVD present. Carotid bruit is not present. No thyromegaly present. Cardiovascular: Normal rate, regular rhythm, S1 normal, S2 normal and intact distal pulses. Exam reveals no gallop and no friction rub. Murmur heard. High-pitched blowing mid to late systolic murmur is present with a grade of 3/6 at the apex. Pulmonary/Chest: Effort normal and breath sounds normal. She has no wheezes. She has no rales. Abdominal: Soft. She exhibits no mass. There is no abdominal tenderness. Musculoskeletal:         General: No edema. Lymphadenopathy:        Right cervical: No superficial cervical adenopathy present. Left cervical: No superficial cervical adenopathy present. Neurological: She is alert and oriented to person, place, and time. Skin: Skin is warm and dry. No rash noted. Psychiatric: She has a normal mood and affect. Her behavior is normal.       ASSESSMENT and PLAN    Mitral regurgitation: 5/19  Mild  HLD : Results for Noe Barboza (MRN [id number]) as of 10/17/2019 12:28   Ref. Range 6/13/2019 08:57 7/19/2019 08:49   Triglyceride Latest Ref Range: 40 - 149 mg/dL 71 67   Cholesterol, total Latest Ref Range: 110 - 200 mg/dL 173 101 (L)   HDL Cholesterol Latest Ref Range: 40 - 59 mg/dL 34 (L) 32 (L)   CHOLESTEROL/HDL Latest Ref Range: 0.0 - 5.0  5.1 3.2   LDL, calculated Latest Ref Range: 50 - 99 mg/dL 125 (H) 56   VLDL, calculated Latest Ref Range: 8 - 30 mg/dL 14 13     Chest pain complaint today is constant for the last 2 days, is positional and relieved moderately with Tylenol. Likely it is musculoskeletal.  Would not recommend any cardiac catheterizations yet. .  Recommended that she try Aleve for 3 days twice a day and see how the pain does. She is in agreement with the plan. Will recheck lipids. Patient is also understanding and convinced that this pain is musculoskeletal.      Diagnoses and all orders for this visit:    1.  Precordial pain  -     naproxen sodium (Aleve) 220 mg tablet; Take 1 Tab by mouth two (2) times daily (with meals). 2. Non-rheumatic mitral regurgitation    3. Hypercholesteremia  -     LIPID PANEL; Future    4. Nonrheumatic mitral valve regurgitation        Pertinent laboratory and test data reviewed and discussed with patient. See patient instructions also for other medical advice given    Medications Discontinued During This Encounter   Medication Reason    Cholestyramine-Aspartame (PREVALITE) 4 gram powder Therapy Completed    nitroglycerin (NITROSTAT) 0.4 mg SL tablet        Follow-up and Dispositions    · Return in about 6 months (around 2/3/2021), or if symptoms worsen or fail to improve.

## 2020-08-03 NOTE — PROGRESS NOTES
1. Have you been to the ER, urgent care clinic since your last visit? Hospitalized since your last visit?     no    2. Have you seen or consulted any other health care providers outside of the [address] [place] [address] since your last visit? Include any pap smears or colon screening. No     3. Since your last visit, have you had any of the following symptoms? chest pains and swelling in legs/arms. 4.  Have you had any blood work, X-rays or cardiac testing? Yes Where: [facility]         5. Where do you normally have your labs drawn?   [facility]  6. Do you need any refills today?    no

## 2020-08-03 NOTE — PATIENT INSTRUCTIONS
Medications Discontinued During This Encounter Medication Reason  Cholestyramine-Aspartame (PREVALITE) 4 gram powder Therapy Completed  nitroglycerin (NITROSTAT) 0.4 mg SL tablet Chest Pain: Care Instructions Your Care Instructions There are many things that can cause chest pain. Some are not serious and will get better on their own in a few days. But some kinds of chest pain need more testing and treatment. Your doctor may have recommended a follow-up visit in the next 8 to 12 hours. If you are not getting better, you may need more tests or treatment. Even though your doctor has released you, you still need to watch for any problems. The doctor carefully checked you, but sometimes problems can develop later. If you have new symptoms or if your symptoms do not get better, get medical care right away. If you have worse or different chest pain or pressure that lasts more than 5 minutes or you passed out (lost consciousness), [id number] or seek other emergency help right away. A medical visit is only one step in your treatment. Even if you feel better, you still need to do what your doctor recommends, such as going to all suggested follow-up appointments and taking medicines exactly as directed. This will help you recover and help prevent future problems. How can you care for yourself at home? · Rest until you feel better. · Take your medicine exactly as prescribed. Call your doctor if you think you are having a problem with your medicine. · Do not drive after taking a prescription pain medicine. When should you call for help? [id number]:  
· You passed out (lost consciousness). · You have severe difficulty breathing. · You have symptoms of a heart attack. These may include: 
? Chest pain or pressure, or a strange feeling in your chest. 
? Sweating. ? Shortness of breath. ? Nausea or vomiting.  
? Pain, pressure, or a strange feeling in your back, neck, jaw, or upper belly or in one or both shoulders or arms. ? Lightheadedness or sudden weakness. ? A fast or irregular heartbeat. After you call 911, the  may tell you to chew 1 adult-strength or 2 to 4 low-dose aspirin. Wait for an ambulance. Do not try to drive yourself. Call your doctor today if:  
· You have any trouble breathing. · Your chest pain gets worse. · You are dizzy or lightheaded, or you feel like you may faint. · You are not getting better as expected. · You are having new or different chest pain. Where can you learn more? Go to http://louise-martine.info/ Enter A120 in the search box to learn more about \"Chest Pain: Care Instructions. \" Current as of: June 26, 2019               Content Version: 12.5 © 1233-6719 [employer]. Care instructions adapted under license by [employer] (which disclaims liability or warranty for this information). If you have questions about a medical condition or this instruction, always ask your healthcare professional. [address] any warranty or liability for your use of this information. Tapestry Activation Thank you for requesting access to Tapestry. Please follow the instructions below to securely access and download your online medical record. Tapestry allows you to send messages to your doctor, view your test results, renew your prescriptions, schedule appointments, and more. How Do I Sign Up? 1. In your internet browser, go to [email or web address]. 2. Click on the First Time User? Click Here link in the Sign In box. You will see the New Member Sign Up page. 3. Enter your [employer] Access Code exactly as it appears below. You will not need to use this code after youve completed the sign-up process. If you do not sign up before the expiration date, you must request a new code.  
 
T[employer] Access Code: [email or web address] 
 Expires: 2020 10:33 AM (This is the date your [employer] access code will ) 4. Enter the last four digits of your Social Security Number (xxxx) and Date of Birth (mm/dd/yyyy) as indicated and click Submit. You will be taken to the next sign-up page. 5. Create a [employer] ID. This will be your [employer] login ID and cannot be changed, so think of one that is secure and easy to remember. 6. Create a [employer] password. You can change your password at any time. 7. Enter your Password Reset Question and Answer. This can be used at a later time if you forget your password. 8. Enter your e-mail address. You will receive e-mail notification when new information is available in [address] 9. Click Sign Up. You can now view and download portions of your medical record. 10. Click the Download Summary menu link to download a portable copy of your medical information. Additional Information If you have questions, please visit the Frequently Asked Questions section of the [employer] website at [email or web address] Remember, [employer] is NOT to be used for urgent needs. For medical emergencies, dial 911.

## 2020-10-16 ENCOUNTER — APPOINTMENT (OUTPATIENT)
Dept: GENERAL RADIOLOGY | Age: 65
End: 2020-10-16
Attending: EMERGENCY MEDICINE
Payer: MEDICARE

## 2020-10-16 ENCOUNTER — HOSPITAL ENCOUNTER (EMERGENCY)
Age: 65
Discharge: HOME | End: 2020-10-16
Attending: EMERGENCY MEDICINE
Payer: MEDICARE

## 2020-10-16 VITALS
WEIGHT: 150 LBS | SYSTOLIC BLOOD PRESSURE: 132 MMHG | HEIGHT: 64 IN | OXYGEN SATURATION: 97 % | RESPIRATION RATE: 18 BRPM | BODY MASS INDEX: 25.61 KG/M2 | DIASTOLIC BLOOD PRESSURE: 82 MMHG | HEART RATE: 78 BPM | TEMPERATURE: 98.5 F

## 2020-10-16 DIAGNOSIS — R35.0 URINARY FREQUENCY: ICD-10-CM

## 2020-10-16 DIAGNOSIS — R10.9 RIGHT FLANK PAIN: Primary | ICD-10-CM

## 2020-10-16 LAB
ANION GAP SERPL CALC-SCNC: 7 MMOL/L
APPEARANCE UR: CLEAR
BACTERIA URNS QL MICRO: NEGATIVE /HPF
BASOPHILS # BLD: 0 K/UL (ref 0–0.1)
BASOPHILS NFR BLD: 0 % (ref 0–2)
BILIRUB UR QL: NEGATIVE
BUN SERPL-MCNC: 18 MG/DL (ref 9–21)
BUN/CREAT SERPL: 36
CA-I BLD-MCNC: 9.7 MG/DL (ref 8.5–10.5)
CHLORIDE SERPL-SCNC: 108 MMOL/L (ref 94–111)
CO2 SERPL-SCNC: 25 MMOL/L (ref 21–33)
COLOR UR: ABNORMAL
CREAT SERPL-MCNC: 0.5 MG/DL (ref 0.7–1.2)
EOSINOPHIL # BLD: 0.2 K/UL (ref 0–0.4)
EOSINOPHIL NFR BLD: 3 % (ref 0–5)
EPITH CASTS URNS QL MICRO: ABNORMAL /LPF (ref 0–20)
ERYTHROCYTE [DISTWIDTH] IN BLOOD BY AUTOMATED COUNT: 12.1 % (ref 11.6–14.5)
GLUCOSE SERPL-MCNC: 91 MG/DL (ref 70–110)
GLUCOSE UR STRIP.AUTO-MCNC: NEGATIVE MG/DL
HCT VFR BLD AUTO: 40.3 % (ref 35–45)
HGB BLD-MCNC: 13.4 G/DL (ref 12–16)
HGB UR QL STRIP: NEGATIVE
IMM GRANULOCYTES # BLD AUTO: 0 K/UL
IMM GRANULOCYTES NFR BLD AUTO: 0 %
KETONES UR QL STRIP.AUTO: NEGATIVE MG/DL
LEUKOCYTE ESTERASE UR QL STRIP.AUTO: NEGATIVE
LYMPHOCYTES # BLD: 2.4 K/UL (ref 0.9–3.6)
LYMPHOCYTES NFR BLD: 40 % (ref 21–52)
MCH RBC QN AUTO: 30.9 PG (ref 24–34)
MCHC RBC AUTO-ENTMCNC: 33.3 G/DL (ref 31–37)
MCV RBC AUTO: 93.1 FL (ref 74–97)
MONOCYTES # BLD: 0.4 K/UL (ref 0.05–1.2)
MONOCYTES NFR BLD: 7 % (ref 3–10)
NEUTS SEG # BLD: 3 K/UL (ref 1.8–8)
NEUTS SEG NFR BLD: 50 % (ref 40–73)
NITRITE UR QL STRIP.AUTO: NEGATIVE
PH UR STRIP: 8 [PH] (ref 5–9)
PLATELET # BLD AUTO: 284 K/UL (ref 135–420)
PMV BLD AUTO: 9.6 FL (ref 9.2–11.8)
POTASSIUM SERPL-SCNC: 4.1 MMOL/L (ref 3.2–5.1)
PROT UR STRIP-MCNC: NEGATIVE MG/DL
RBC # BLD AUTO: 4.33 M/UL (ref 4.2–5.3)
RBC #/AREA URNS HPF: ABNORMAL /HPF (ref 0–2)
SODIUM SERPL-SCNC: 140 MMOL/L (ref 135–145)
SP GR UR REFRACTOMETRY: 1.02 (ref 1–1.03)
TROPONIN I SERPL-MCNC: <0.02 NG/ML (ref 0.02–0.05)
UROBILINOGEN UR QL STRIP.AUTO: 0.2 EU/DL (ref 0.2–1)
WBC # BLD AUTO: 6 K/UL (ref 4.6–13.2)
WBC URNS QL MICRO: ABNORMAL /HPF (ref 0–4)

## 2020-10-16 PROCEDURE — 71045 X-RAY EXAM CHEST 1 VIEW: CPT

## 2020-10-16 PROCEDURE — 81003 URINALYSIS AUTO W/O SCOPE: CPT

## 2020-10-16 PROCEDURE — 80048 BASIC METABOLIC PNL TOTAL CA: CPT

## 2020-10-16 PROCEDURE — 85025 COMPLETE CBC W/AUTO DIFF WBC: CPT

## 2020-10-16 PROCEDURE — 84484 ASSAY OF TROPONIN QUANT: CPT

## 2020-10-16 PROCEDURE — 99283 EMERGENCY DEPT VISIT LOW MDM: CPT

## 2020-10-16 RX ORDER — DEXTROMETHORPHAN HYDROBROMIDE, GUAIFENESIN 5; 100 MG/5ML; MG/5ML
1300 LIQUID ORAL 2 TIMES DAILY
COMMUNITY

## 2020-10-16 RX ORDER — LEVOTHYROXINE SODIUM 88 UG/1
88 TABLET ORAL
COMMUNITY
End: 2022-06-23

## 2020-10-16 RX ORDER — LOPERAMIDE HYDROCHLORIDE AND SIMETHICONE 2; 125 MG/1; MG/1
1 TABLET ORAL
COMMUNITY
End: 2022-06-23

## 2020-10-16 RX ORDER — DIPHENHYDRAMINE HCL 25 MG
25 CAPSULE ORAL
COMMUNITY
End: 2021-03-14

## 2020-10-16 RX ORDER — IBUPROFEN 800 MG/1
800 TABLET ORAL
Qty: 20 TAB | Refills: 0 | Status: SHIPPED | OUTPATIENT
Start: 2020-10-16 | End: 2020-10-23

## 2020-10-16 NOTE — ED TRIAGE NOTES
Pt states \"for 3 days I have been having right-sided lower back pain that moves to my groin. I have had urinary frequency x1 week with a strong odor. \"

## 2020-10-16 NOTE — ED PROVIDER NOTES
72 y/o female with past medical history UTI, kidney stones, osteoporosis, arthritis, hypercholesterolemia, arthritis presents to the ED with c/o right low back pain x 3 days. Pain radiates into the right mid back and right groin area. She reports associated urinary frequency x 1 week, worse x 3 days, and malodorous urine. Pt denies any recent UTIs. She also notes chest congestion, but denies rash, fever, shortness of breath or any other sx. She denies smoking or EtOH consumption. The history is provided by the patient. Back Pain    This is a new problem. The current episode started more than 2 days ago. The pain is present in the right side, lower back and middle back. The pain radiates to the right groin. Associated symptoms include dysuria and pelvic pain. Pertinent negatives include no chest pain, no fever, no headaches, no abdominal pain and no weakness. Risk factors include a history of osteoporosis and history of kidney stones.         Past Medical History:   Diagnosis Date    Arthritis     Hematuria     Hypercholesteremia 10/17/2019    Kidney stones     Osteoporosis     Thyroid disease     UTI (urinary tract infection)        Past Surgical History:   Procedure Laterality Date    HX APPENDECTOMY      HX BLADDER SUSPENSION      HX BREAST LUMPECTOMY      HX CHOLECYSTECTOMY      HX HYSTERECTOMY           Family History:   Problem Relation Age of Onset    Other Mother         irregular heart beat    Stroke Father 61    Stroke Sister 72    Heart Surgery Sister 76    Coronary Artery Disease Sister        Social History     Socioeconomic History    Marital status: UNKNOWN     Spouse name: Not on file    Number of children: Not on file    Years of education: Not on file    Highest education level: Not on file   Occupational History    Not on file   Social Needs    Financial resource strain: Not on file    Food insecurity     Worry: Not on file     Inability: Not on file   Powered needs     Medical: Not on file     Non-medical: Not on file   Tobacco Use    Smoking status: Never Smoker    Smokeless tobacco: Never Used   Substance and Sexual Activity    Alcohol use: No    Drug use: Never    Sexual activity: Not on file   Lifestyle    Physical activity     Days per week: Not on file     Minutes per session: Not on file    Stress: Not on file   Relationships    Social connections     Talks on phone: Not on file     Gets together: Not on file     Attends [employer] service: Not on file     Active member of club or organization: Not on file     Attends meetings of clubs or organizations: Not on file     Relationship status: Not on file    Intimate partner violence     Fear of current or ex partner: Not on file     Emotionally abused: Not on file     Physically abused: Not on file     Forced sexual activity: Not on file   Other Topics Concern    Not on file   Social History Narrative    Not on file         ALLERGIES: Amitriptyline; Aspirin; and Demerol [meperidine]    Review of Systems   Constitutional: Negative for chills, fatigue and fever. HENT: Negative for congestion, ear pain, rhinorrhea and sore throat. Eyes: Negative for pain and redness. Respiratory: Negative for cough, shortness of breath and wheezing. Cardiovascular: Negative for chest pain and palpitations. Gastrointestinal: Negative for abdominal pain, diarrhea, nausea and vomiting. Genitourinary: Positive for dysuria, flank pain, frequency and pelvic pain. Negative for hematuria. Musculoskeletal: Positive for back pain (right-sided). Negative for arthralgias and myalgias. Skin: Negative for color change, rash and wound. Neurological: Negative for dizziness, weakness, light-headedness and headaches. Psychiatric/Behavioral: Negative for confusion.        Vitals:    10/16/20 1127   BP: 132/82   Pulse: 78   Resp: 18   Temp: 98.5 °F (36.9 °C)   SpO2: 97%   Weight: 68 kg (150 lb)   Height: 5' 4\" (1.626 m) Physical Exam  Vitals signs and nursing note reviewed. Constitutional:       General: She is awake. She is not in acute distress. Appearance: Normal appearance. She is well-developed and normal weight. She is not ill-appearing, toxic-appearing or diaphoretic. Interventions: Face mask in place. HENT:      Head: Normocephalic and atraumatic. Eyes:      Extraocular Movements: Extraocular movements intact. Pupils: Pupils are equal, round, and reactive to light. Neck:      Musculoskeletal: Full passive range of motion without pain and neck supple. Cardiovascular:      Rate and Rhythm: Normal rate and regular rhythm. Heart sounds: Normal heart sounds. Pulmonary:      Effort: Pulmonary effort is normal.      Breath sounds: Normal breath sounds and air entry. Abdominal:      General: Abdomen is flat. Bowel sounds are normal.      Palpations: Abdomen is soft. Tenderness: There is no abdominal tenderness. There is right CVA tenderness. Skin:     General: Skin is warm and dry. Neurological:      Mental Status: She is alert and oriented to person, place, and time. GCS: GCS eye subscore is 4. GCS verbal subscore is 5. GCS motor subscore is 6. Psychiatric:         Mood and Affect: Mood and affect normal.         Behavior: Behavior normal. Behavior is cooperative. Thought Content:  Thought content normal.          MDM  Number of Diagnoses or Management Options  Right flank pain:   Urinary frequency:   Diagnosis management comments: Differential diagnosis includes: UTI, strain, anxiety, URI       Amount and/or Complexity of Data Reviewed  Clinical lab tests: ordered and reviewed  Tests in the radiology section of CPT®: ordered and reviewed  Tests in the medicine section of CPT®: ordered and reviewed  Review and summarize past medical records: yes  Independent visualization of images, tracings, or specimens: yes    Risk of Complications, Morbidity, and/or Mortality  Presenting problems: high  Diagnostic procedures: high  Management options: high    Patient Progress  Patient progress: stable         Procedures    Recent Results (from the past 12 hour(s))   URINALYSIS W/ RFLX MICROSCOPIC    Collection Time: 10/16/20 11:35 AM   Result Value Ref Range    Color Yellow/Straw      Appearance Clear Clear      Specific gravity 1.020 1.003 - 1.035      pH (UA) 8.0 5.0 - 9.0      Protein Negative Negative mg/dL    Glucose Negative Negative mg/dL    Ketone Negative Negative mg/dL    Bilirubin Negative Negative      Blood Negative Negative      Urobilinogen 0.2 0.2 - 1.0 EU/dL    Nitrites Negative Negative      Leukocyte Esterase Negative Negative      WBC 0-4 0 - 4 /hpf    RBC 0-5 0 - 2 /hpf    Epithelial cells Few 0 - 20 /lpf    Bacteria Negative (A) None /hpf   CBC WITH AUTOMATED DIFF    Collection Time: 10/16/20 11:35 AM   Result Value Ref Range    WBC 6.0 4.6 - 13.2 K/uL    RBC 4.33 4.20 - 5.30 M/uL    HGB 13.4 12.0 - 16.0 g/dL    HCT 40.3 35.0 - 45.0 %    MCV 93.1 74.0 - 97.0 FL    MCH 30.9 24.0 - 34.0 PG    MCHC 33.3 31.0 - 37.0 g/dL    RDW 12.1 11.6 - 14.5 %    PLATELET 553 645 - 453 K/uL    MPV 9.6 9.2 - 11.8 FL    NEUTROPHILS 50 40 - 73 %    LYMPHOCYTES 40 21 - 52 %    MONOCYTES 7 3 - 10 %    EOSINOPHILS 3 0 - 5 %    BASOPHILS 0 0 - 2 %    IMMATURE GRANULOCYTES 0 %    ABS. NEUTROPHILS 3.0 1.8 - 8.0 K/UL    ABS. LYMPHOCYTES 2.4 0.9 - 3.6 K/UL    ABS. MONOCYTES 0.4 0.05 - 1.2 K/UL    ABS. EOSINOPHILS 0.2 0.0 - 0.4 K/UL    ABS. BASOPHILS 0.0 0.0 - 0.1 K/UL    ABS. IMM.  GRANS. 0.0 K/UL   METABOLIC PANEL, BASIC    Collection Time: 10/16/20 11:35 AM   Result Value Ref Range    Sodium 140 135 - 145 mmol/L    Potassium 4.1 3.2 - 5.1 mmol/L    Chloride 108 94 - 111 mmol/L    CO2 25 21 - 33 mmol/L    Anion gap 7 mmol/L    Glucose 91 70 - 110 mg/dL    BUN 18 9 - 21 mg/dL    Creatinine 0.50 (L) 0.70 - 1.20 mg/dL    BUN/Creatinine ratio 36      GFR est AA >60 ml/min/1.73m2    GFR est non-AA >60 ml/min/1.73m2    Calcium 9.7 8.5 - 10.5 mg/dL   TROPONIN I    Collection Time: 10/16/20 11:35 AM   Result Value Ref Range    Troponin-I, Qt. <0.02 (L) 0.02 - 0.05 ng/mL     XR CHEST PORT    preliminary interpretation by Dr. Donny Webber acute abnormalities. 12:47 PM Upon re-evaluation the patient's symptoms have improved. Pt has non-toxic appearance and condition is stable for discharge. She was informed of her results, instructed to f/u with her PCP and return to the ED upon worsening of symptoms. All questions and concerns were addressed. Diagnosis:   1. Right flank pain    2. Urinary frequency          Disposition: Discharge home    Follow-up Information     Follow up With Specialties Details Why Contact Info    Daniella Nino NP Nurse Practitioner Schedule an appointment as soon as possible for a visit in 3 days  [address] [place], [place] [id number] [phone]      [facility] EMERGENCY DEPT Emergency Medicine  As needed, If symptoms worsen [address]  [phone]          Patient's Medications   Start Taking    IBUPROFEN (MOTRIN) 800 MG TABLET    Take 1 Tab by mouth every six (6) hours as needed for Pain for up to 7 days. Continue Taking    ACETAMINOPHEN (TYLENOL) 650 MG TBER    Take 1,300 mg by mouth. ASPIRIN DELAYED-RELEASE 81 MG TABLET    Take  by mouth daily. ATORVASTATIN (LIPITOR) 10 MG TABLET    Take 1 Tab by mouth daily. BUDESONIDE-FORMOTEROL (SYMBICORT) 160-4.5 MCG/ACTUATION HFAA    Take 2 Puffs by inhalation. CONJUGATED ESTROGENS (PREMARIN) 0.625 MG/GRAM VAGINAL CREAM    Insert 0.5 g into vagina daily. CYCLOBENZAPRINE (FLEXERIL) 10 MG TABLET    Take  by mouth as needed for Muscle Spasm(s). DICLOFENAC (VOLTAREN) 1 % GEL    Apply  to affected area as needed. DIPHENHYDRAMINE (BENADRYL) 25 MG CAPSULE    Take 25 mg by mouth every six (6) hours as needed.     FLUOCINONIDE (LIDEX) 0.05 % OINTMENT    Apply  to affected area two (2) times a day. GABAPENTIN (NEURONTIN) 300 MG CAPSULE    Take 300 mg by mouth three (3) times daily. L.ACIDOPHILUS/B.BIFIDUM,LONGUM (PROBIOTIC COLON SUPPORT PO)    Take  by mouth as needed. LEVOTHYROXINE (SYNTHROID) 88 MCG TABLET    Take 88 mcg by mouth Daily (before breakfast). LOPERAMIDE-SIMETHICONE 2-125 MG TAB    Take 1 Tab by mouth. OMEPRAZOLE (PRILOSEC) 20 MG CAPSULE    Take 20 mg by mouth daily. POLYETHYLENE GLYCOL (MIRALAX) 17 GRAM/DOSE POWDER    Take 17 g by mouth two (2) times a day. These Medications have changed    No medications on file   Stop Taking    ACETAMINOPHEN (TYLENOL ARTHRITIS PAIN) 650 MG TBER    Take 650 mg by mouth every eight (8) hours. HYDROCORTISONE (HYCORT) 1 % OINTMENT    Apply  to affected area as needed for Skin Irritation. use thin layer    LEVOTHYROXINE (SYNTHROID) 75 MCG TABLET    Take 75 mcg by mouth Daily (before breakfast). MULTIVIT-MIN/FOLIC ACID/VIT K1 (MULTI FOR HER 50 PLUS PO)    Take  by mouth. NAPROXEN SODIUM (ALEVE) 220 MG TABLET    Take 1 Tab by mouth two (2) times daily (with meals). Attestations:    By signing my name below, Dorota Natalie, attest that this documentation has been prepared under the direction and in presence of Dr. Eb Beck on 10/16/20. Electronically signed: Higinio Hdz, 10/16/20, 11:47 AM    Provider Attestation:  I personally performed the services described in the documentation, reviewed the documentation, as recorded by the scribe in my presence, and it accurately and completely records my words and actions. Dr. Jyothi Greene.  Sammy BLEVINS 12:44 PM

## 2020-10-16 NOTE — DISCHARGE INSTRUCTIONS
Patient Education        Flank Pain: Care Instructions  Your Care Instructions  Flank pain is pain on the side of the back just below the rib cage and above the waist. It can be on one or both sides. Flank pain has many possible causes, including a kidney stone, a urinary tract infection, or back strain. Flank pain may get better on its own. But don't ignore new symptoms, such as fever, nausea and vomiting, urination problems, pain that gets worse, and dizziness. These may be signs of a more serious problem. You may have to have tests or other treatment. Follow-up care is a key part of your treatment and safety. Be sure to make and go to all appointments, and call your doctor if you are having problems. It's also a good idea to know your test results and keep a list of the medicines you take. How can you care for yourself at home? · Rest until you feel better. · Take pain medicines exactly as directed. ? If the doctor gave you a prescription medicine for pain, take it as prescribed. ? If you are not taking a prescription pain medicine, ask your doctor if you can take an over-the-counter pain medicine, such as acetaminophen (Tylenol), ibuprofen (Advil, Motrin), or naproxen (Aleve). Read and follow all instructions on the label. · If your doctor prescribed antibiotics, take them as directed. Do not stop taking them just because you feel better. You need to take the full course of antibiotics. · To apply heat, put a warm water bottle, a heating pad set on low, or a warm cloth on the painful area. Do not go to sleep with a heating pad on your skin. · To prevent dehydration, drink plenty of fluids, enough so that your urine is light yellow or clear like water. Choose water and other caffeine-free clear liquids until you feel better. If you have kidney, heart, or liver disease and have to limit fluids, talk with your doctor before you increase the amount of fluids you drink. When should you call for help? Call your doctor now or seek immediate medical care if:    · Your flank pain gets worse.     · You have new symptoms, such as fever, nausea, or vomiting.     · You have symptoms of a urinary problem. For example:  ? You have blood or pus in your urine. ? You have chills or body aches. ? It hurts to urinate. ? You have groin or belly pain. Watch closely for changes in your health, and be sure to contact your doctor if you do not get better as expected. Where can you learn more? Go to http://www.guzmán.com/  Enter S191 in the search box to learn more about \"Flank Pain: Care Instructions. \"  Current as of: June 26, 2019               Content Version: 12.6  © 7358-1976 [employer]. Care instructions adapted under license by [employer] (which disclaims liability or warranty for this information). If you have questions about a medical condition or this instruction, always ask your healthcare professional. [place] [address] any warranty or liability for your use of this information. Patient Education        Frequent Urination: Care Instructions  Your Care Instructions  An urge to urinate frequently but usually passing only small amounts of urine is a common symptom of urinary problems, such as urinary tract infections. The bladder may become inflamed. This can cause the urge to urinate. You may try to urinate more often than usual to try to soothe that urge. Frequent urination also may be caused by sexually transmitted infections (STIs) or kidney stones. Or it may happen when something irritates the tube that carries urine from the bladder to the outside of the body (urethra). It may also be a sign of diabetes. The cause may be hard to find. You may need tests. Follow-up care is a key part of your treatment and safety. Be sure to make and go to all appointments, and call your doctor if you are having problems.  It's also a good idea to know your test results and keep a list of the medicines you take. How can you care for yourself at home? · Drink extra water for the next day or two. This will help make the urine less concentrated. (If you have kidney, heart, or liver disease and have to limit fluids, talk with your doctor before you increase the amount of fluids you drink.)  · Avoid drinks that are carbonated or have caffeine. They can irritate the bladder. For women:  · Urinate right after you have sex. · After you go to the bathroom, wipe from front to back. · Avoid douches, bubble baths, and feminine hygiene sprays. And avoid other feminine hygiene products that have deodorants. When should you call for help? Call your doctor now or seek immediate medical care if:    · You have new symptoms, such as fever, nausea, or vomiting.     · You have new or worse symptoms of a urinary problem. For example:  ? You have blood or pus in your urine. ? You have chills or body aches. ? It hurts to urinate. ? You have groin or belly pain. ? You have pain in your back just below your rib cage (the flank area). Watch closely for changes in your health, and be sure to contact your doctor if you feel thirstier than usual.  Where can you learn more? Go to http://www.gray.com/  Enter I431 in the search box to learn more about \"Frequent Urination: Care Instructions. \"  Current as of: June 29, 2020               Content Version: 12.6  © 2006-2020 [employer]. Care instructions adapted under license by [employer] (which disclaims liability or warranty for this information). If you have questions about a medical condition or this instruction, always ask your healthcare professional. [address] any warranty or liability for your use of this information.

## 2021-01-27 ENCOUNTER — TRANSCRIBE ORDER (OUTPATIENT)
Dept: SCHEDULING | Age: 66
End: 2021-01-27

## 2021-01-27 DIAGNOSIS — G44.89 OTHER HEADACHE SYNDROME: Primary | ICD-10-CM

## 2021-02-08 ENCOUNTER — HOSPITAL ENCOUNTER (OUTPATIENT)
Dept: MRI IMAGING | Age: 66
Discharge: HOME | End: 2021-02-08
Attending: NURSE PRACTITIONER
Payer: MEDICARE

## 2021-02-08 DIAGNOSIS — G44.89 OTHER HEADACHE SYNDROME: ICD-10-CM

## 2021-02-08 PROCEDURE — 70551 MRI BRAIN STEM W/O DYE: CPT

## 2021-02-15 ENCOUNTER — OFFICE VISIT (OUTPATIENT)
Dept: CARDIOLOGY CLINIC | Age: 66
End: 2021-02-15
Payer: MEDICARE

## 2021-02-15 VITALS
BODY MASS INDEX: 26.12 KG/M2 | DIASTOLIC BLOOD PRESSURE: 70 MMHG | HEART RATE: 68 BPM | TEMPERATURE: 97.8 F | SYSTOLIC BLOOD PRESSURE: 120 MMHG | WEIGHT: 153 LBS | HEIGHT: 64 IN

## 2021-02-15 DIAGNOSIS — R07.2 PRECORDIAL PAIN: Primary | ICD-10-CM

## 2021-02-15 DIAGNOSIS — I34.0 NON-RHEUMATIC MITRAL REGURGITATION: ICD-10-CM

## 2021-02-15 DIAGNOSIS — E78.00 HYPERCHOLESTEREMIA: ICD-10-CM

## 2021-02-15 PROCEDURE — 99213 OFFICE O/P EST LOW 20 MIN: CPT | Performed by: INTERNAL MEDICINE

## 2021-02-15 PROCEDURE — G8536 NO DOC ELDER MAL SCRN: HCPCS | Performed by: INTERNAL MEDICINE

## 2021-02-15 PROCEDURE — 1090F PRES/ABSN URINE INCON ASSESS: CPT | Performed by: INTERNAL MEDICINE

## 2021-02-15 PROCEDURE — 1101F PT FALLS ASSESS-DOCD LE1/YR: CPT | Performed by: INTERNAL MEDICINE

## 2021-02-15 PROCEDURE — G8419 CALC BMI OUT NRM PARAM NOF/U: HCPCS | Performed by: INTERNAL MEDICINE

## 2021-02-15 PROCEDURE — G8432 DEP SCR NOT DOC, RNG: HCPCS | Performed by: INTERNAL MEDICINE

## 2021-02-15 PROCEDURE — 3017F COLORECTAL CA SCREEN DOC REV: CPT | Performed by: INTERNAL MEDICINE

## 2021-02-15 PROCEDURE — G8427 DOCREV CUR MEDS BY ELIG CLIN: HCPCS | Performed by: INTERNAL MEDICINE

## 2021-02-15 PROCEDURE — 93000 ELECTROCARDIOGRAM COMPLETE: CPT | Performed by: INTERNAL MEDICINE

## 2021-02-15 RX ORDER — ATORVASTATIN CALCIUM 10 MG/1
10 TABLET, FILM COATED ORAL DAILY
Qty: 90 TAB | Refills: 3 | Status: CANCELLED | OUTPATIENT
Start: 2021-02-15

## 2021-02-15 NOTE — PROGRESS NOTES
1. Have you been to the ER, urgent care clinic since your last visit? Hospitalized since your last visit?     no    2. Have you seen or consulted any other health care providers outside of the [address] [place] [address] since your last visit? Include any pap smears or colon screening. No     3. Since your last visit, have you had any of the following symptoms? chest pains and dizziness. 4.  Have you had any blood work, X-rays or cardiac testing? No       5. Where do you normally have your labs drawn?   [facility]  6. Do you need any refills today?    no

## 2021-02-15 NOTE — PROGRESS NOTES
HISTORY OF PRESENT ILLNESS  Dannie Lugo is a 76 y.o. female. Chest Pain (Angina)   The history is provided by the patient. This is a recurrent problem. The current episode started more than 1 week ago (11/18). The problem has not changed (Was in ER on 2/10/2020-negative stress test) since onset. Duration of episode(s) is 2 days. The problem occurs constantly. The pain is associated with rest and normal activity. The pain is present in the substernal region, lateral region and right side. The quality of the pain is described as pressure-like. The pain radiates to the right arm (right chest/right back). The symptoms are aggravated by exertion and certain positions (Sometimes may be worsening with vacuuming with right hand). Associated symptoms include lower extremity edema. Pertinent negatives include no claudication, no cough, no diaphoresis, no dizziness, no fever, no malaise/fatigue, no nausea, no orthopnea, no palpitations, no PND and no vomiting. She has tried rest and NSAIDs for the symptoms. The treatment provided moderate relief. Cholesterol Problem  The history is provided by the medical records. This is a chronic problem. Associated symptoms include chest pain. Hypertension  The history is provided by the medical records. This is a chronic problem. Associated symptoms include chest pain. Leg Swelling  The history is provided by the patient. This is a chronic problem. The current episode started more than 1 week ago (2017). The problem occurs daily. The problem has not changed since onset. Associated symptoms include chest pain. The symptoms are aggravated by standing. The symptoms are relieved by sleep (Compression stockings). Dizziness  The history is provided by the patient. This is a recurrent problem. The current episode started more than 1 week ago (11/18). The problem occurs rarely (Feels like she is falling sideways but has never fallen). The problem has not changed since onset. Associated symptoms include chest pain. The symptoms are aggravated by standing and walking. The symptoms are relieved by rest.       Review of Systems   Constitutional: Negative for chills, diaphoresis, fever, malaise/fatigue and weight loss. HENT: Negative for nosebleeds. Eyes: Negative for discharge. Respiratory: Negative for cough and wheezing. Cardiovascular: Positive for chest pain and leg swelling. Negative for palpitations, orthopnea, claudication and PND. Gastrointestinal: Negative for diarrhea, nausea and vomiting. Genitourinary: Negative for dysuria and hematuria. Musculoskeletal: Negative for joint pain. Skin: Negative for rash. Neurological: Negative for dizziness, seizures and loss of consciousness. Endo/Heme/Allergies: Negative for polydipsia. Does not bruise/bleed easily. Psychiatric/Behavioral: Negative for depression and substance abuse. The patient does not have insomnia. Allergies   Allergen Reactions    Amitriptyline Other (comments)    Demerol [Meperidine] Other (comments)       Past Medical History:   Diagnosis Date    Arthritis     Hematuria     Hypercholesteremia 10/17/2019    Kidney stones     Osteoporosis     Thyroid disease     UTI (urinary tract infection)        Family History   Problem Relation Age of Onset    Other Mother         irregular heart beat    Stroke Father 61    Stroke Sister 72    Heart Surgery Sister 76    Coronary Artery Disease Sister        Social History     Tobacco Use    Smoking status: Never Smoker    Smokeless tobacco: Never Used   Substance Use Topics    Alcohol use: No    Drug use: Never        Current Outpatient Medications   Medication Sig    diphenhydrAMINE (BENADRYL) 25 mg capsule Take 25 mg by mouth every six (6) hours as needed.  loperamide-simethicone 2-125 mg tab Take 1 Tab by mouth.  acetaminophen (TYLENOL) 650 mg TbER Take 1,300 mg by mouth.     levothyroxine (Synthroid) 88 mcg tablet Take 88 mcg by mouth Daily (before breakfast).  budesonide-formoteroL (Symbicort) 160-4.5 mcg/actuation HFAA Take 2 Puffs by inhalation.  polyethylene glycol (Miralax) 17 gram/dose powder Take 17 g by mouth two (2) times a day.  fluocinoNIDE (LIDEX) 0.05 % ointment Apply  to affected area two (2) times a day.  atorvastatin (LIPITOR) 10 mg tablet Take 1 Tab by mouth daily.  diclofenac (VOLTAREN) 1 % gel Apply  to affected area as needed.  L.acidophilus/B. bifidum,longum (PROBIOTIC COLON SUPPORT PO) Take  by mouth as needed.  omeprazole (PRILOSEC) 20 mg capsule Take 20 mg by mouth daily.  aspirin delayed-release 81 mg tablet Take  by mouth daily.  cyclobenzaprine (FLEXERIL) 10 mg tablet Take  by mouth as needed for Muscle Spasm(s).  gabapentin (Neurontin) 600 mg tablet Take 600 mg by mouth daily.  conjugated estrogens (PREMARIN) 0.625 mg/gram vaginal cream Insert 0.5 g into vagina daily. No current facility-administered medications for this visit. Past Surgical History:   Procedure Laterality Date    HX APPENDECTOMY      HX BLADDER SUSPENSION      HX BREAST LUMPECTOMY      HX CHOLECYSTECTOMY      HX HYSTERECTOMY         Visit Vitals  /70   Pulse 68   Temp 97.8 °F (36.6 °C) (Temporal)   Ht 5' 4\" (1.626 m)   Wt 69.4 kg (153 lb)   BMI 26.26 kg/m²       Diagnostic Studies:  I have reviewed the relevant tests done on the patient and show as follows  EKG tracings reviewed by me today. EKG Results     Procedure 720 Value Units Date/Time    AMB POC EKG ROUTINE W/ 12 LEADS, INTER & REP [[id number]] Resulted: 02/15/21 1132    Order Status: Completed Updated: 02/15/21 1127        XR Results (most recent):  Results from [place] encounter on 10/16/20   XR CHEST PORT    Narrative EXAM: XR CHEST PORT    CLINICAL INDICATION/HISTORY: Cough    > Additional: None. COMPARISON: None. TECHNIQUE: Portable chest    _______________    FINDINGS:    SUPPORT DEVICES: None.     HEART AND MEDIASTINUM: Normal size and contour. Normal pulmonary vasculature. LUNGS AND PLEURAL SPACES: The lungs are well expanded and clear. No focal  consolidation, effusion, or pneumothorax. BONY THORAX AND SOFT TISSUES: No acute osseous abnormality. _______________      Impression IMPRESSION:    No active cardiopulmonary disease. 5/19 ECHO  Interpretation Summary        · Left Ventricle: Mild concentric hypertrophy. Estimated left ventricular ejection fraction is 56 - 60%. No regional wall motion abnormality noted. Mild (grade 1) left ventricular diastolic dysfunction. · Normal right ventricular size and function. · Left Atrium: Moderately dilated left atrium. · Mitral Valve: Mitral valve thickening. Mild mitral valve regurgitation. · Tricuspid Valve: Mild tricuspid valve regurgitation is present. There is no evidence of pulmonary hypertension.               Comparison Study Information     Prior Study     There is a prior study available for comparison that was performed on 5/21/2015. As compared to the previous study, there are no significant changes. 5/19 Nuclear Stress Test     Normal myocardial perfusion imaging. Myocardial perfusion imaging supports a low risk stress test.   There is a prior study available for comparison. Interpretation Summary     · Baseline ECG: Sinus rhythm, non-specific ST-T wave abnormalities. First degree AV block  · Gated SPECT: Left ventricular function post-stress was normal. Calculated ejection fraction is 72%. There is no evidence of transient ischemic dilation (TID). The TID ratio is 1.06.  · Inconclusive stress test.  · Left ventricular perfusion is probably normal.  · Myocardial perfusion imaging defect 1: There is a defect that is small to moderate in size with a mild reduction in uptake present in the apical to basal inferior location(s) that is non-reversible. There is normal wall motion in the defect area. Viability in the area is good.  The defect appears to be an artifact caused by soft tissue. · Normal myocardial perfusion imaging. Myocardial perfusion imaging supports a low risk stress test.          Ms. Bianca Arteaga has a reminder for a \"due or due soon\" health maintenance. I have asked that she contact her primary care provider for follow-up on this health maintenance. Physical Exam   Constitutional: She is oriented to person, place, and time. She appears well-developed and well-nourished. No distress. HENT:   Head: Normocephalic and atraumatic. Mouth/Throat: Normal dentition. Eyes: Right eye exhibits no discharge. Left eye exhibits no discharge. No scleral icterus. Neck: Neck supple. No JVD present. Carotid bruit is not present. No thyromegaly present. Cardiovascular: Normal rate, regular rhythm, S1 normal, S2 normal and intact distal pulses. Exam reveals no gallop and no friction rub. Murmur heard. High-pitched blowing mid to late systolic murmur is present with a grade of 3/6 at the apex. Pulmonary/Chest: Effort normal and breath sounds normal. She has no wheezes. She has no rales. She exhibits tenderness (Reproducible right anterior chest wall pain). Abdominal: Soft. She exhibits no mass. There is no abdominal tenderness. Musculoskeletal:         General: No edema. Lymphadenopathy:        Right cervical: No superficial cervical adenopathy present. Left cervical: No superficial cervical adenopathy present. Neurological: She is alert and oriented to person, place, and time. Skin: Skin is warm and dry. No rash noted. Psychiatric: She has a normal mood and affect. Her behavior is normal.       ASSESSMENT and PLAN    Mitral regurgitation: 5/19  Mild  HLD : Results for Rosa Post (MRN [id number]) as of 10/17/2019 12:28   Ref.  Range 6/13/2019 08:57 7/19/2019 08:49   Triglyceride Latest Ref Range: 40 - 149 mg/dL 71 67   Cholesterol, total Latest Ref Range: 110 - 200 mg/dL 173 101 (L)   HDL Cholesterol Latest Ref Range: 40 - 59 mg/dL 34 (L) 32 (L)   CHOLESTEROL/HDL Latest Ref Range: 0.0 - 5.0  5.1 3.2   LDL, calculated Latest Ref Range: 50 - 99 mg/dL 125 (H) 56   VLDL, calculated Latest Ref Range: 8 - 30 mg/dL 14 13     Chest pain complaint today is constant for the last 2 days, is positional and relieved moderately with Tylenol. Likely it is musculoskeletal.  Would not recommend any cardiac catheterizations yet. .  Recommended that she try Aleve for 3 days twice a day and see how the pain does. She is in agreement with the plan. Will recheck lipids. Patient is also understanding and convinced that this pain is musculoskeletal.      Diagnoses and all orders for this visit:    1. Precordial pain    2. Hypercholesteremia  -     AMB POC EKG ROUTINE W/ 12 LEADS, INTER & REP    3. Non-rheumatic mitral regurgitation        Pertinent laboratory and test data reviewed and discussed with patient. See patient instructions also for other medical advice given    There are no discontinued medications. Follow-up and Dispositions    · Return in about 2 years (around 2/15/2023), or if symptoms worsen or fail to improve, for with ekg.       2/21 continues to have precordial pain which is now constant and is reproducible with local tenderness. Explained to the patient again that is musculoskeletal.  She understands. No further work-up recommended yet. Edema is controlled well with stockings. MR is stable. Follow clinically. Patient told to call back if there is any significant shortness of breath or worsening of edema.

## 2021-02-15 NOTE — PATIENT INSTRUCTIONS
There are no discontinued medications. Learning About the [address] [facility] [employer] Diet What is the Mediterranean diet? The Mediterranean diet is a style of eating rather than a diet plan. It features foods eaten in [place], [place], [place] and [place], and other countries along the [facility]. It emphasizes eating foods like fish, fruits, vegetables, beans, high-fiber breads and whole grains, nuts, and olive oil. This style of eating includes limited red meat, cheese, and sweets. Why choose the Mediterranean diet? A Mediterranean-style diet may improve heart health. It contains more fat than other heart-healthy diets. But the fats are mainly from nuts, unsaturated oils (such as fish oils and olive oil), and certain nut or seed oils (such as canola, soybean, or flaxseed oil). These fats may help protect the heart and blood vessels. How can you get started on the Mediterranean diet? Here are some things you can do to switch to a more Mediterranean way of eating. What to eat · Eat a variety of fruits and vegetables each day, such as grapes, blueberries, tomatoes, broccoli, peppers, figs, olives, spinach, eggplant, beans, lentils, and chickpeas. · Eat a variety of whole-grain foods each day, such as oats, brown rice, and whole wheat bread, pasta, and couscous. · Eat fish at least 2 times a week. Try tuna, salmon, mackerel, lake trout, herring, or sardines. · Eat moderate amounts of low-fat dairy products, such as milk, cheese, or yogurt. · Eat moderate amounts of poultry and eggs. · Choose healthy (unsaturated) fats, such as nuts, olive oil, and certain nut or seed oils like canola, soybean, and flaxseed. · Limit unhealthy (saturated) fats, such as butter, palm oil, and coconut oil. And limit fats found in animal products, such as meat and dairy products made with whole milk. Try to eat red meat only a few times a month in very small amounts. · Limit sweets and desserts to only a few times a week. This includes sugar-sweetened drinks like soda. The Mediterranean diet may also include red wine with your meal1 glass each day for women and up to 2 glasses a day for men. Tips for eating at home · Use herbs, spices, garlic, lemon zest, and citrus juice instead of salt to add flavor to foods. · Add avocado slices to your sandwich instead of west. · Have fish for lunch or dinner instead of red meat. Brush the fish with olive oil, and broil or grill it. · Sprinkle your salad with seeds or nuts instead of cheese. · Cook with olive or canola oil instead of butter or oils that are high in saturated fat. · Switch from 2% milk or whole milk to 1% or fat-free milk. · Dip raw vegetables in a vinaigrette dressing or hummus instead of dips made from mayonnaise or sour cream. 
· Have a piece of fruit for dessert instead of a piece of cake. Try baked apples, or have some dried fruit. Tips for eating out · Try broiled, grilled, baked, or poached fish instead of having it fried or breaded. · Ask your  to have your meals prepared with olive oil instead of butter. · Order dishes made with marinara sauce or sauces made from olive oil. Avoid sauces made from cream or mayonnaise. · Choose whole-grain breads, whole wheat pasta and pizza crust, brown rice, beans, and lentils. · Cut back on butter or margarine on bread. Instead, you can dip your bread in a small amount of olive oil. · Ask for a side salad or grilled vegetables instead of french fries or chips. Where can you learn more? Go to http://www.gray.com/ Enter 003-627-4345 in the search box to learn more about \"Learning About the Mediterranean Diet. \" Current as of: August 22, 2019               Content Version: 12.6 © 7009-7931 [employer]. Care instructions adapted under license by [employer] (which disclaims liability or warranty for this information). If you have questions about a medical condition or this instruction, always ask your healthcare professional. [address] any warranty or liability for your use of this information.

## 2021-03-14 ENCOUNTER — HOSPITAL ENCOUNTER (EMERGENCY)
Age: 66
Discharge: HOME | End: 2021-03-14
Attending: INTERNAL MEDICINE
Payer: MEDICARE

## 2021-03-14 ENCOUNTER — APPOINTMENT (OUTPATIENT)
Dept: GENERAL RADIOLOGY | Age: 66
End: 2021-03-14
Attending: INTERNAL MEDICINE
Payer: MEDICARE

## 2021-03-14 VITALS
BODY MASS INDEX: 25.95 KG/M2 | OXYGEN SATURATION: 98 % | SYSTOLIC BLOOD PRESSURE: 124 MMHG | HEART RATE: 82 BPM | RESPIRATION RATE: 16 BRPM | HEIGHT: 64 IN | WEIGHT: 152 LBS | TEMPERATURE: 98.7 F | DIASTOLIC BLOOD PRESSURE: 73 MMHG

## 2021-03-14 DIAGNOSIS — S80.11XA CONTUSION OF LEG, RIGHT, INITIAL ENCOUNTER: Primary | ICD-10-CM

## 2021-03-14 PROCEDURE — 74011000250 HC RX REV CODE- 250: Performed by: INTERNAL MEDICINE

## 2021-03-14 PROCEDURE — 99283 EMERGENCY DEPT VISIT LOW MDM: CPT

## 2021-03-14 PROCEDURE — 73590 X-RAY EXAM OF LOWER LEG: CPT

## 2021-03-14 RX ORDER — CETIRIZINE HCL 10 MG
10 TABLET ORAL DAILY
COMMUNITY
End: 2021-12-25

## 2021-03-14 RX ORDER — BACITRACIN 500 UNIT/G
1 PACKET (EA) TOPICAL
Status: COMPLETED | OUTPATIENT
Start: 2021-03-14 | End: 2021-03-14

## 2021-03-14 RX ADMIN — BACITRACIN 1 PACKET: 500 OINTMENT TOPICAL at 16:13

## 2021-03-14 NOTE — LETTER
Patricia Huerta was seen and treated in our emergency department on 3/14/2021. She may return to work on 3/17/21. If you have any questions or concerns, please don't hesitate to call.

## 2021-03-14 NOTE — ED PROVIDER NOTES
EMERGENCY DEPARTMENT HISTORY AND PHYSICAL EXAM      Date: 3/14/2021  Patient Name: Patricia Huerta    History of Presenting Illness     Chief Complaint   Patient presents with   • Leg Injury       History Provided By: Patient    HPI: Patricia Huerta, 65 y.o. female with PMHx of arthritis, osteoporosis, hypercholesterolemia, and thyroid disease presents to the ED with complaints of leg injury. Pt states a tree branch had fallen out of her wheelbarrow 3 hours PTA, resulting in a sharp tree branch hitting her R lower leg today. Pt currently c/o numbness, deformity, mild abrasion, and swelling in her R LE. Pt notes she has been ambulatory. Pt denies any other injuries or sxs.      There are no other complaints, changes, or physical findings at this time.    PCP: Natasha Peña NP    Current Outpatient Medications   Medication Sig Dispense Refill   • cetirizine (ZyrTEC) 10 mg tablet Take 10 mg by mouth daily.     • acetaminophen (TYLENOL) 650 mg TbER Take 1,300 mg by mouth two (2) times a day.     • levothyroxine (Synthroid) 88 mcg tablet Take 88 mcg by mouth Daily (before breakfast).     • atorvastatin (LIPITOR) 10 mg tablet Take 1 Tab by mouth daily. 90 Tab 3   • L.acidophilus/B.bifidum,longum (PROBIOTIC COLON SUPPORT PO) Take  by mouth daily.     • omeprazole (PRILOSEC) 20 mg capsule Take 40 mg by mouth daily.     • aspirin delayed-release 81 mg tablet Take  by mouth daily.     • gabapentin (Neurontin) 600 mg tablet Take 600 mg by mouth daily.     • conjugated estrogens (PREMARIN) 0.625 mg/gram vaginal cream Insert 0.5 g into vagina daily.     • loperamide-simethicone 2-125 mg tab Take 1 Tab by mouth.     • budesonide-formoteroL (Symbicort) 160-4.5 mcg/actuation HFAA Take 2 Puffs by inhalation.     • polyethylene glycol (Miralax) 17 gram/dose powder Take 17 g by mouth two (2) times a day.     • fluocinoNIDE (LIDEX) 0.05 % ointment Apply  to affected area two (2) times a day.     • diclofenac (VOLTAREN) 1  % gel Apply  to affected area as needed.  cyclobenzaprine (FLEXERIL) 10 mg tablet Take  by mouth as needed for Muscle Spasm(s). Past History     Past Medical History:  Past Medical History:   Diagnosis Date    Arthritis     Hematuria     Hypercholesteremia 10/17/2019    Kidney stones     Osteoporosis     Thyroid disease     UTI (urinary tract infection)        Past Surgical History:  Past Surgical History:   Procedure Laterality Date    HX APPENDECTOMY      HX BLADDER SUSPENSION      HX BREAST LUMPECTOMY      HX CHOLECYSTECTOMY      HX HYSTERECTOMY         Family History:  Family History   Problem Relation Age of Onset    Other Mother         irregular heart beat    Stroke Father 61    Stroke Sister 72    Heart Surgery Sister 76    Coronary Artery Disease Sister        Social History:  Social History     Tobacco Use    Smoking status: Never Smoker    Smokeless tobacco: Never Used   Substance Use Topics    Alcohol use: No    Drug use: Never       Allergies: Allergies   Allergen Reactions    Darvocet A500 [Propoxyphene N-Acetaminophen] Nausea and Vomiting    Amitriptyline Other (comments)    Demerol [Meperidine] Other (comments)         Review of Systems   Review of Systems   Musculoskeletal:        R lower leg pain and deformity   Skin: Positive for wound. Neurological: Positive for numbness. Physical Exam   Physical Exam  Vitals signs and nursing note reviewed. Constitutional:       General: She is awake. Appearance: Normal appearance. She is well-developed, well-groomed and normal weight. Interventions: Face mask in place. HENT:      Head: Normocephalic and atraumatic. Eyes:      Extraocular Movements: Extraocular movements intact. Pupils: Pupils are equal, round, and reactive to light. Neck:      Musculoskeletal: Full passive range of motion without pain, normal range of motion and neck supple. Abdominal:      General: Abdomen is flat. Palpations: Abdomen is soft. Musculoskeletal:      Right lower leg: She exhibits tenderness, swelling and deformity. Edema present. Left lower leg: Normal.      Comments: Large, forming hematoma present on R lower leg near ankle. Multiple abrasions, non-bleeding, present. Skin:     General: Skin is warm and dry. Neurological:      General: No focal deficit present. Mental Status: She is alert and oriented to person, place, and time. Psychiatric:         Attention and Perception: Attention and perception normal.         Mood and Affect: Mood and affect normal.         Behavior: Behavior normal. Behavior is cooperative. Thought Content: Thought content normal.         Cognition and Memory: Cognition and memory normal.         Judgment: Judgment normal.         Diagnostic Study Results     Labs -   No results found for this or any previous visit (from the past 12 hour(s)). Radiologic Studies -   XR TIB/FIB RT   Final Result      1. Soft tissue edema/contusion without radiopaque foreign body. 2. No acute osseous abnormality. CT Results  (Last 48 hours)    None        CXR Results  (Last 48 hours)    None          Medical Decision Making and ED Course   I am the first provider for this patient. I reviewed the vital signs, available nursing notes, past medical history, past surgical history, family history and social history. Vital Signs-Reviewed the patient's vital signs. Patient Vitals for the past 12 hrs:   Temp Pulse Resp BP SpO2   03/14/21 1516 98.7 °F (37.1 °C) 82 16 124/73 98 %       Records Reviewed: Nursing Notes        ED Course:   Initial assessment performed. The patients presenting problems have been discussed, and they are in agreement with the care plan formulated and outlined with them. I have encouraged them to ask questions as they arise throughout their visit. Disposition     Disposition:  Discharged    DISCHARGE PLAN:  1.    Current Discharge Medication List      CONTINUE these medications which have NOT CHANGED    Details   cetirizine (ZyrTEC) 10 mg tablet Take 10 mg by mouth daily. acetaminophen (TYLENOL) 650 mg TbER Take 1,300 mg by mouth two (2) times a day. levothyroxine (Synthroid) 88 mcg tablet Take 88 mcg by mouth Daily (before breakfast). atorvastatin (LIPITOR) 10 mg tablet Take 1 Tab by mouth daily. Qty: 90 Tab, Refills: 3    Comments: Please consider 90 day supplies to promote better adherence  Associated Diagnoses: Hypercholesteremia      L.acidophilus/B. bifidum,longum (PROBIOTIC COLON SUPPORT PO) Take  by mouth daily. omeprazole (PRILOSEC) 20 mg capsule Take 40 mg by mouth daily. aspirin delayed-release 81 mg tablet Take  by mouth daily. gabapentin (Neurontin) 600 mg tablet Take 600 mg by mouth daily. conjugated estrogens (PREMARIN) 0.625 mg/gram vaginal cream Insert 0.5 g into vagina daily. loperamide-simethicone 2-125 mg tab Take 1 Tab by mouth.      budesonide-formoteroL (Symbicort) 160-4.5 mcg/actuation HFAA Take 2 Puffs by inhalation. polyethylene glycol (Miralax) 17 gram/dose powder Take 17 g by mouth two (2) times a day. fluocinoNIDE (LIDEX) 0.05 % ointment Apply  to affected area two (2) times a day. diclofenac (VOLTAREN) 1 % gel Apply  to affected area as needed. cyclobenzaprine (FLEXERIL) 10 mg tablet Take  by mouth as needed for Muscle Spasm(s). 2.   Follow-up Information     Follow up With Specialties Details Why Contact Ritesh Bryan NP Nurse Practitioner Schedule an appointment as soon as possible for a visit in 2 days For wound re-check [facility]  [address] [place] [address]  [phone]          3. Return to ED if worse     Diagnosis     Clinical Impression:   1.  Contusion of leg, right, initial encounter        Attestations:    No att. providers found    By signing my name below, Adelaida Clemons, attest that this documentation has been prepared under the direction and in presence of Dr. Dmitry Maya on 03/14/21. Electronically signed: Shanell Carrillo, 03/14/21, 3:31 PM        Please note that this dictation was completed with [employer], the computer voice recognition software. Quite often unanticipated grammatical, syntax, homophones, and other interpretive errors are inadvertently transcribed by the computer software. Please disregard these errors. Please excuse any errors that have escaped final proofreading. Thank you.

## 2021-03-14 NOTE — ED NOTES
Patient presents to the ED after a branch his her right lower leg. There is a deformity, abrasion, but she has been ambulatory. Some numbness reported.

## 2021-03-29 ENCOUNTER — HOSPITAL ENCOUNTER (EMERGENCY)
Age: 66
Discharge: HOME | End: 2021-03-29
Attending: FAMILY MEDICINE
Payer: MEDICARE

## 2021-03-29 VITALS
HEIGHT: 64 IN | OXYGEN SATURATION: 97 % | WEIGHT: 152 LBS | HEART RATE: 79 BPM | RESPIRATION RATE: 18 BRPM | BODY MASS INDEX: 25.95 KG/M2 | TEMPERATURE: 97.8 F | DIASTOLIC BLOOD PRESSURE: 83 MMHG | SYSTOLIC BLOOD PRESSURE: 132 MMHG

## 2021-03-29 DIAGNOSIS — M79.604 RIGHT LEG PAIN: Primary | ICD-10-CM

## 2021-03-29 PROCEDURE — 99283 EMERGENCY DEPT VISIT LOW MDM: CPT

## 2021-03-29 PROCEDURE — 74011000250 HC RX REV CODE- 250: Performed by: FAMILY MEDICINE

## 2021-03-29 PROCEDURE — 74011250637 HC RX REV CODE- 250/637: Performed by: FAMILY MEDICINE

## 2021-03-29 RX ORDER — KETOROLAC TROMETHAMINE 10 MG/1
10 TABLET, FILM COATED ORAL
Qty: 15 TAB | Refills: 0 | Status: SHIPPED | OUTPATIENT
Start: 2021-03-29 | End: 2021-07-29

## 2021-03-29 RX ORDER — KETOROLAC TROMETHAMINE 10 MG/1
10 TABLET, FILM COATED ORAL ONCE
Status: COMPLETED | OUTPATIENT
Start: 2021-03-29 | End: 2021-03-29

## 2021-03-29 RX ADMIN — KETOROLAC TROMETHAMINE 10 MG: 10 TABLET, FILM COATED ORAL at 15:46

## 2021-03-29 RX ADMIN — Medication 1 SPRAY: at 15:35

## 2021-03-29 NOTE — ED PROVIDER NOTES
EMERGENCY DEPARTMENT HISTORY AND PHYSICAL EXAM      Date: 3/29/2021  Patient Name: Jose Huerta    History of Presenting Illness     No chief complaint on file. History Provided By: Patient    HPI: Elidia Canela, 76 y.o. female with a past medical history significant hyperlipidemia presents to the ED with cc of right leg pain. Patient bumped her leg into an object a couple weeks ago. She had a large hematoma develop. She had a lot of discoloration. A lot of the swelling has gone down but she persists with having pain. She is only been taken Tylenol. Pain is an 8 out of 10. It sharp. She denies any fevers or chills. There are no other complaints, changes, or physical findings at this time. PCP: Janna Callaway NP    No current facility-administered medications on file prior to encounter. Current Outpatient Medications on File Prior to Encounter   Medication Sig Dispense Refill    cetirizine (ZyrTEC) 10 mg tablet Take 10 mg by mouth daily.  loperamide-simethicone 2-125 mg tab Take 1 Tab by mouth.  acetaminophen (TYLENOL) 650 mg TbER Take 1,300 mg by mouth two (2) times a day.  levothyroxine (Synthroid) 88 mcg tablet Take 88 mcg by mouth Daily (before breakfast).  budesonide-formoteroL (Symbicort) 160-4.5 mcg/actuation HFAA Take 2 Puffs by inhalation.  polyethylene glycol (Miralax) 17 gram/dose powder Take 17 g by mouth two (2) times a day.  fluocinoNIDE (LIDEX) 0.05 % ointment Apply  to affected area two (2) times a day.  atorvastatin (LIPITOR) 10 mg tablet Take 1 Tab by mouth daily. 90 Tab 3    diclofenac (VOLTAREN) 1 % gel Apply  to affected area as needed.  L.acidophilus/B. bifidum,longum (PROBIOTIC COLON SUPPORT PO) Take  by mouth daily.  omeprazole (PRILOSEC) 20 mg capsule Take 40 mg by mouth daily.  aspirin delayed-release 81 mg tablet Take  by mouth daily.       cyclobenzaprine (FLEXERIL) 10 mg tablet Take  by mouth as needed for Muscle Spasm(s).  gabapentin (Neurontin) 600 mg tablet Take 600 mg by mouth daily.  conjugated estrogens (PREMARIN) 0.625 mg/gram vaginal cream Insert 0.5 g into vagina daily. Past History     Past Medical History:  Past Medical History:   Diagnosis Date    Arthritis     Hematuria     Hypercholesteremia 10/17/2019    Kidney stones     Osteoporosis     Thyroid disease     UTI (urinary tract infection)        Past Surgical History:  Past Surgical History:   Procedure Laterality Date    HX APPENDECTOMY      HX BLADDER SUSPENSION      HX BREAST LUMPECTOMY      HX CHOLECYSTECTOMY      HX HYSTERECTOMY         Family History:  Family History   Problem Relation Age of Onset    Other Mother         irregular heart beat    Stroke Father 61    Stroke Sister 72    Heart Surgery Sister 76    Coronary Artery Disease Sister        Social History:  Social History     Tobacco Use    Smoking status: Never Smoker    Smokeless tobacco: Never Used   Substance Use Topics    Alcohol use: No    Drug use: Never       Allergies: Allergies   Allergen Reactions    Darvocet A500 [Propoxyphene N-Acetaminophen] Nausea and Vomiting    Amitriptyline Other (comments)    Demerol [Meperidine] Other (comments)         Review of Systems     Review of Systems   Constitutional: Negative for fatigue and fever. HENT: Negative for rhinorrhea and sore throat. Respiratory: Negative for cough and shortness of breath. Cardiovascular: Negative for chest pain and palpitations. Gastrointestinal: Negative for abdominal pain, diarrhea, nausea and vomiting. Genitourinary: Negative for difficulty urinating and dysuria. Musculoskeletal: Negative for arthralgias and myalgias. Skin: Negative for color change and rash. Neurological: Negative for light-headedness and headaches. Physical Exam     Physical Exam  Vitals signs and nursing note reviewed. Constitutional:       General: She is awake. She is not in acute distress. Appearance: Normal appearance. She is well-developed and normal weight. She is not ill-appearing, toxic-appearing or diaphoretic. Interventions: Face mask in place. HENT:      Head: Normocephalic and atraumatic. Eyes:      Conjunctiva/sclera: Conjunctivae normal.      Pupils: Pupils are equal, round, and reactive to light. Neck:      Musculoskeletal: Normal range of motion and neck supple. Cardiovascular:      Rate and Rhythm: Normal rate and regular rhythm. Pulses: Normal pulses. Heart sounds: Normal heart sounds. Pulmonary:      Effort: Pulmonary effort is normal.      Breath sounds: Normal breath sounds. Abdominal:      Tenderness: There is no abdominal tenderness. Musculoskeletal:      Comments: RLE - fluctuant mass, tenderness noted, no discoloration or redness. Skin:     General: Skin is warm and dry. Neurological:      Mental Status: She is alert and oriented to person, place, and time. GCS: GCS eye subscore is 4. GCS verbal subscore is 5. GCS motor subscore is 6. Psychiatric:         Mood and Affect: Mood and affect normal.         Behavior: Behavior normal. Behavior is cooperative. Thought Content: Thought content normal.         Lab and Diagnostic Study Results     Labs -   No results found for this or any previous visit (from the past 12 hour(s)). Radiologic Studies -   @lastxrresult@  CT Results  (Last 48 hours)    None        CXR Results  (Last 48 hours)    None            Medical Decision Making   - I am the first provider for this patient. - I reviewed the vital signs, available nursing notes, past medical history, past surgical history, family history and social history. - Initial assessment performed. The patients presenting problems have been discussed, and they are in agreement with the care plan formulated and outlined with them.   I have encouraged them to ask questions as they arise throughout their visit.    Vital Signs-Reviewed the patient's vital signs. Patient Vitals for the past 12 hrs:   Temp Pulse Resp BP SpO2   03/29/21 1528 97.8 °F (36.6 °C) 79 18 132/83 97 %       Records Reviewed: Nursing Notes            ED Course:          Provider Notes (Medical Decision Making):     MDM     8658 -patient is a 56-year-old presented with right leg pain due to a hematoma. Under sterile conditions I attempted to aspirate the fluid. This was unsuccessful x2 attempts. We will put her on some Toradol and have her follow-up with Dr. Gabrielle Boogie. Procedures   Medical Decision Makingedical Decision Making        Disposition   Disposition:     Discharged    DISCHARGE PLAN:  1. Current Discharge Medication List      START taking these medications    Details   ketorolac (TORADOL) 10 mg tablet Take 1 Tab by mouth every eight (8) hours as needed for Pain. Qty: 15 Tab, Refills: 0         CONTINUE these medications which have NOT CHANGED    Details   cetirizine (ZyrTEC) 10 mg tablet Take 10 mg by mouth daily. loperamide-simethicone 2-125 mg tab Take 1 Tab by mouth. acetaminophen (TYLENOL) 650 mg TbER Take 1,300 mg by mouth two (2) times a day. levothyroxine (Synthroid) 88 mcg tablet Take 88 mcg by mouth Daily (before breakfast). budesonide-formoteroL (Symbicort) 160-4.5 mcg/actuation HFAA Take 2 Puffs by inhalation. polyethylene glycol (Miralax) 17 gram/dose powder Take 17 g by mouth two (2) times a day. fluocinoNIDE (LIDEX) 0.05 % ointment Apply  to affected area two (2) times a day. atorvastatin (LIPITOR) 10 mg tablet Take 1 Tab by mouth daily. Qty: 90 Tab, Refills: 3    Comments: Please consider 90 day supplies to promote better adherence  Associated Diagnoses: Hypercholesteremia      diclofenac (VOLTAREN) 1 % gel Apply  to affected area as needed. L.acidophilus/B. bifidum,longum (PROBIOTIC COLON SUPPORT PO) Take  by mouth daily.       omeprazole (PRILOSEC) 20 mg capsule Take 40 mg by mouth daily. aspirin delayed-release 81 mg tablet Take  by mouth daily. cyclobenzaprine (FLEXERIL) 10 mg tablet Take  by mouth as needed for Muscle Spasm(s). gabapentin (Neurontin) 600 mg tablet Take 600 mg by mouth daily. conjugated estrogens (PREMARIN) 0.625 mg/gram vaginal cream Insert 0.5 g into vagina daily. 2.   Follow-up Information     Follow up With Specialties Details Why Contact Info    Grace Canas MD Orthopedic Surgery In 1 week As needed [address]  [phone]          3. Return to ED if worse   4. Current Discharge Medication List      START taking these medications    Details   ketorolac (TORADOL) 10 mg tablet Take 1 Tab by mouth every eight (8) hours as needed for Pain. Qty: 15 Tab, Refills: 0               Diagnosis     Clinical Impression:   1. Right leg pain        Attestations:    Dena Arechiga MD    Please note that this dictation was completed with [employer]arm[employer], the computer voice recognition software. Quite often unanticipated grammatical, syntax, homophones, and other interpretive errors are inadvertently transcribed by the computer software. Please disregard these errors. Please excuse any errors that have escaped final proofreading. Thank you.

## 2021-03-29 NOTE — ED TRIAGE NOTES
Patient seen on prior visit for hematoma to right lower leg. Patient states bruising and swelling have improved but pain has not. Went to urgent care this afternoon and was told to go to the ER for possible I&D.

## 2021-04-09 ENCOUNTER — OFFICE VISIT (OUTPATIENT)
Dept: ORTHOPEDIC SURGERY | Age: 66
End: 2021-04-09
Payer: MEDICARE

## 2021-04-09 DIAGNOSIS — M25.561 RIGHT KNEE PAIN, UNSPECIFIED CHRONICITY: Primary | ICD-10-CM

## 2021-04-09 PROCEDURE — G8510 SCR DEP NEG, NO PLAN REQD: HCPCS | Performed by: ORTHOPAEDIC SURGERY

## 2021-04-09 PROCEDURE — G8536 NO DOC ELDER MAL SCRN: HCPCS | Performed by: ORTHOPAEDIC SURGERY

## 2021-04-09 PROCEDURE — 1090F PRES/ABSN URINE INCON ASSESS: CPT | Performed by: ORTHOPAEDIC SURGERY

## 2021-04-09 PROCEDURE — G8419 CALC BMI OUT NRM PARAM NOF/U: HCPCS | Performed by: ORTHOPAEDIC SURGERY

## 2021-04-09 PROCEDURE — 3017F COLORECTAL CA SCREEN DOC REV: CPT | Performed by: ORTHOPAEDIC SURGERY

## 2021-04-09 PROCEDURE — G8427 DOCREV CUR MEDS BY ELIG CLIN: HCPCS | Performed by: ORTHOPAEDIC SURGERY

## 2021-04-09 PROCEDURE — 99203 OFFICE O/P NEW LOW 30 MIN: CPT | Performed by: ORTHOPAEDIC SURGERY

## 2021-04-09 PROCEDURE — 1101F PT FALLS ASSESS-DOCD LE1/YR: CPT | Performed by: ORTHOPAEDIC SURGERY

## 2021-04-09 NOTE — PROGRESS NOTES
Name: Marichuy Huerta    : 1955     Service Dept: [address] [facility] and Sports Medicine    Patient's Pharmacies:    [facility] [address], [address] [place]  [address] [facility]  [place] [address]  Phone: [phone] Fax: [phone]       Chief Complaint   Patient presents with    Leg Pain        There were no vitals taken for this visit. Allergies   Allergen Reactions    Darvocet A500 [Propoxyphene N-Acetaminophen] Nausea and Vomiting    Amitriptyline Other (comments)    Demerol [Meperidine] Other (comments)      Current Outpatient Medications   Medication Sig Dispense Refill    ketorolac (TORADOL) 10 mg tablet Take 1 Tab by mouth every eight (8) hours as needed for Pain. 15 Tab 0    cetirizine (ZyrTEC) 10 mg tablet Take 10 mg by mouth daily.  loperamide-simethicone 2-125 mg tab Take 1 Tab by mouth.  acetaminophen (TYLENOL) 650 mg TbER Take 1,300 mg by mouth two (2) times a day.  levothyroxine (Synthroid) 88 mcg tablet Take 88 mcg by mouth Daily (before breakfast).  budesonide-formoteroL (Symbicort) 160-4.5 mcg/actuation HFAA Take 2 Puffs by inhalation.  polyethylene glycol (Miralax) 17 gram/dose powder Take 17 g by mouth two (2) times a day.  fluocinoNIDE (LIDEX) 0.05 % ointment Apply  to affected area two (2) times a day.  atorvastatin (LIPITOR) 10 mg tablet Take 1 Tab by mouth daily. 90 Tab 3    diclofenac (VOLTAREN) 1 % gel Apply  to affected area as needed.  L.acidophilus/B. bifidum,longum (PROBIOTIC COLON SUPPORT PO) Take  by mouth daily.  omeprazole (PRILOSEC) 20 mg capsule Take 40 mg by mouth daily.  aspirin delayed-release 81 mg tablet Take  by mouth daily.  cyclobenzaprine (FLEXERIL) 10 mg tablet Take  by mouth as needed for Muscle Spasm(s).  gabapentin (Neurontin) 600 mg tablet Take 600 mg by mouth daily.       conjugated estrogens (PREMARIN) 0.625 mg/gram vaginal cream Insert 0.5 g into vagina daily. Patient Active Problem List   Diagnosis Code    Hematuria R31.9    Kidney stones N20.0    Osteoporosis M81.0    UTI (urinary tract infection) N39.0    Atypical angina ([address] I20.8    Non-rheumatic mitral regurgitation I34.0    Chest pain R07.9    Hypercholesteremia E78.00    Abnormal EKG R94.31      Family History   Problem Relation Age of Onset    Other Mother         irregular heart beat    Stroke Father 61    Stroke Sister 72    Heart Surgery Sister 76    Coronary Artery Disease Sister       Social History     Socioeconomic History    Marital status:      Spouse name: Not on file    Number of children: Not on file    Years of education: Not on file    Highest education level: Not on file   Tobacco Use    Smoking status: Never Smoker    Smokeless tobacco: Never Used   Substance and Sexual Activity    Alcohol use: No    Drug use: Never      Past Surgical History:   Procedure Laterality Date    HX APPENDECTOMY      HX BLADDER SUSPENSION      HX BREAST LUMPECTOMY      HX CHOLECYSTECTOMY      HX HYSTERECTOMY        Past Medical History:   Diagnosis Date    Arthritis     Hematuria     Hypercholesteremia 10/17/2019    Kidney stones     Osteoporosis     Thyroid disease     UTI (urinary tract infection)         I have reviewed and agree with PFS and ROS and intake form in chart and the record furthermore I have reviewed prior medical record(s) regarding this patients care during this appointment. Review of Systems:   Patient is a pleasant appearing individual, appropriately dressed, well hydrated, well nourished, who is alert, appropriately oriented for age, and in no acute distress with a normal gait and normal affect who does not appear to be in any significant pain. Physical Exam:  Left Ankle - No point tenderness, Full range of motion, No instability, No Weakness, No, skin lesions, No swelling, No instability, Grossly neurovascularly intact. Encounter Diagnoses     ICD-10-CM ICD-9-CM   1. Right knee pain, unspecified chronicity  M25.561 719.46       Physical examination, distal third tibia, she has got soft tissue swelling, good cap refill, grossly neurovascularly intact, no instability, decreased range of motion, decreased strength, some point tenderness and some swelling. HPI:  The patient is here with a chief complaint of right ankle throbbing pain and swelling since March 14, progressively getting worse. Pain is 6/10. ROS:  10-point review of systems is unremarkable. X-rays of the right tibia are unremarkable. Assessment/Plan:  1. Right ankle with hematoma right in the distal third tibia. Plan at this point, my recommendation is for ice, elevate, anti-inflammatories. Activities as tolerated started. No restrictions. We will get an MRI to evaluate for evacuation of hematoma. As part of continued conservative pain management options the patient was advised to utilize Tylenol or OTC NSAIDS as long as it is not medically contraindicated. Return to Office: Follow-up and Dispositions    · Return for POST MRI, w/ xrays. Scribed by Ana Mccoy LPN as dictated by [facility] BETHEL García MD.  Documentation True and Accepted Freddy García MD

## 2021-04-09 NOTE — PATIENT INSTRUCTIONS
Knee Pain or Injury: Care Instructions Your Care Instructions Injuries are a common cause of knee problems. Sudden (acute) injuries may be caused by a direct blow to the knee. They can also be caused by abnormal twisting, bending, or falling on the knee. Pain, bruising, or swelling may be severe, and may start within minutes of the injury. Overuse is another cause of knee pain. Other causes are climbing stairs, kneeling, and other activities that use the knee. Everyday wear and tear, especially as you get older, also can cause knee pain. Rest, along with home treatment, often relieves pain and allows your knee to heal. If you have a serious knee injury, you may need tests and treatment. Follow-up care is a key part of your treatment and safety. Be sure to make and go to all appointments, and call your doctor if you are having problems. It's also a good idea to know your test results and keep a list of the medicines you take. How can you care for yourself at home? · Be safe with medicines. Read and follow all instructions on the label. ? If the doctor gave you a prescription medicine for pain, take it as prescribed. ? If you are not taking a prescription pain medicine, ask your doctor if you can take an over-the-counter medicine. · Rest and protect your knee. Take a break from any activity that may cause pain. · Put ice or a cold pack on your knee for 10 to 20 minutes at a time. Put a thin cloth between the ice and your skin. · Prop up a sore knee on a pillow when you ice it or anytime you sit or lie down for the next 3 days. Try to keep it above the level of your heart. This will help reduce swelling. · If your knee is not swollen, you can put moist heat, a heating pad, or a warm cloth on your knee. · If your doctor recommends an elastic bandage, sleeve, or other type of support for your knee, wear it as directed.  
· Follow your doctor's instructions about how much weight you can put on your leg. Use a cane, crutches, or a walker as instructed. · Follow your doctor's instructions about activity during your healing process. If you can do mild exercise, slowly increase your activity. · Reach and stay at a healthy weight. Extra weight can strain the joints, especially the knees and hips, and make the pain worse. Losing even a few pounds may help. When should you call for help? Call 911 anytime you think you may need emergency care. For example, call if: 
  · You have symptoms of a blood clot in your lung (called a pulmonary embolism). These may include: 
? Sudden chest pain. ? Trouble breathing. ? Coughing up blood. Call your doctor now or seek immediate medical care if: 
  · You have severe or increasing pain.  
  · Your leg or foot turns cold or changes color.  
  · You cannot stand or put weight on your knee.  
  · Your knee looks twisted or bent out of shape.  
  · You cannot move your knee.  
  · You have signs of infection, such as: 
? Increased pain, swelling, warmth, or redness. ? Red streaks leading from the knee. ? Pus draining from a place on your knee. ? A fever.  
  · You have signs of a blood clot in your leg (called a deep vein thrombosis), such as: 
? Pain in your calf, back of the knee, thigh, or groin. ? Redness and swelling in your leg or groin. Watch closely for changes in your health, and be sure to contact your doctor if: 
  · You have tingling, weakness, or numbness in your knee.  
  · You have any new symptoms, such as swelling.  
  · You have bruises from a knee injury that last longer than 2 weeks.  
  · You do not get better as expected. Where can you learn more? Go to http://www.gray.com/ Enter K195 in the search box to learn more about \"Knee Pain or Injury: Care Instructions. \" Current as of: February 26, 2020               Content Version: 12.8 © 2115-5937 [employer].   
Care instructions adapted under license by [employer] (which disclaims liability or warranty for this information). If you have questions about a medical condition or this instruction, always ask your healthcare professional. [address] any warranty or liability for your use of this information.

## 2021-04-16 ENCOUNTER — HOSPITAL ENCOUNTER (OUTPATIENT)
Dept: MRI IMAGING | Age: 66
Discharge: HOME | End: 2021-04-16
Attending: ORTHOPAEDIC SURGERY
Payer: MEDICARE

## 2021-04-16 DIAGNOSIS — M25.561 RIGHT KNEE PAIN, UNSPECIFIED CHRONICITY: ICD-10-CM

## 2021-04-16 PROCEDURE — 73718 MRI LOWER EXTREMITY W/O DYE: CPT

## 2021-04-20 ENCOUNTER — OFFICE VISIT (OUTPATIENT)
Dept: ORTHOPEDIC SURGERY | Age: 66
End: 2021-04-20
Payer: MEDICARE

## 2021-04-20 DIAGNOSIS — M25.472 SWOLLEN ANKLES: Primary | ICD-10-CM

## 2021-04-20 DIAGNOSIS — M25.471 SWOLLEN ANKLES: Primary | ICD-10-CM

## 2021-04-20 PROCEDURE — G8536 NO DOC ELDER MAL SCRN: HCPCS | Performed by: ORTHOPAEDIC SURGERY

## 2021-04-20 PROCEDURE — 99213 OFFICE O/P EST LOW 20 MIN: CPT | Performed by: ORTHOPAEDIC SURGERY

## 2021-04-20 PROCEDURE — G8419 CALC BMI OUT NRM PARAM NOF/U: HCPCS | Performed by: ORTHOPAEDIC SURGERY

## 2021-04-20 PROCEDURE — G8432 DEP SCR NOT DOC, RNG: HCPCS | Performed by: ORTHOPAEDIC SURGERY

## 2021-04-20 PROCEDURE — 3017F COLORECTAL CA SCREEN DOC REV: CPT | Performed by: ORTHOPAEDIC SURGERY

## 2021-04-20 PROCEDURE — G8427 DOCREV CUR MEDS BY ELIG CLIN: HCPCS | Performed by: ORTHOPAEDIC SURGERY

## 2021-04-20 PROCEDURE — 1090F PRES/ABSN URINE INCON ASSESS: CPT | Performed by: ORTHOPAEDIC SURGERY

## 2021-04-20 PROCEDURE — 1101F PT FALLS ASSESS-DOCD LE1/YR: CPT | Performed by: ORTHOPAEDIC SURGERY

## 2021-04-20 NOTE — PROGRESS NOTES
Name: Sly Huerta    : 1955     Service Dept: [facility] and [facility]    Patient's Pharmacies:    [facility] [address] [place] [address]  [place] [address]  Phone: [phone] Fax: [phone]       Chief Complaint   Patient presents with    Leg Pain     Post MRI        There were no vitals taken for this visit. Allergies   Allergen Reactions    Darvocet A500 [Propoxyphene N-Acetaminophen] Nausea and Vomiting    Amitriptyline Other (comments)    Demerol [Meperidine] Other (comments)      Current Outpatient Medications   Medication Sig Dispense Refill    ketorolac (TORADOL) 10 mg tablet Take 1 Tab by mouth every eight (8) hours as needed for Pain. 15 Tab 0    cetirizine (ZyrTEC) 10 mg tablet Take 10 mg by mouth daily.  loperamide-simethicone 2-125 mg tab Take 1 Tab by mouth.  acetaminophen (TYLENOL) 650 mg TbER Take 1,300 mg by mouth two (2) times a day.  levothyroxine (Synthroid) 88 mcg tablet Take 88 mcg by mouth Daily (before breakfast).  budesonide-formoteroL (Symbicort) 160-4.5 mcg/actuation HFAA Take 2 Puffs by inhalation.  polyethylene glycol (Miralax) 17 gram/dose powder Take 17 g by mouth two (2) times a day.  fluocinoNIDE (LIDEX) 0.05 % ointment Apply  to affected area two (2) times a day.  atorvastatin (LIPITOR) 10 mg tablet Take 1 Tab by mouth daily. 90 Tab 3    diclofenac (VOLTAREN) 1 % gel Apply  to affected area as needed.  L.acidophilus/B. bifidum,longum (PROBIOTIC COLON SUPPORT PO) Take  by mouth daily.  omeprazole (PRILOSEC) 20 mg capsule Take 40 mg by mouth daily.  aspirin delayed-release 81 mg tablet Take  by mouth daily.  cyclobenzaprine (FLEXERIL) 10 mg tablet Take  by mouth as needed for Muscle Spasm(s).  gabapentin (Neurontin) 600 mg tablet Take 600 mg by mouth daily.       conjugated estrogens (PREMARIN) 0.625 mg/gram vaginal cream Insert 0.5 g into vagina daily. Patient Active Problem List   Diagnosis Code    Hematuria R31.9    Kidney stones N20.0    Osteoporosis M81.0    UTI (urinary tract infection) N39.0    Atypical angina ([address] I20.8    Non-rheumatic mitral regurgitation I34.0    Chest pain R07.9    Hypercholesteremia E78.00    Abnormal EKG R94.31    Aortic atherosclerosis (HCC) I70.0      Family History   Problem Relation Age of Onset    Other Mother         irregular heart beat    Stroke Father 61    Stroke Sister 72    Heart Surgery Sister 76    Coronary Artery Disease Sister       Social History     Socioeconomic History    Marital status:      Spouse name: Not on file    Number of children: Not on file    Years of education: Not on file    Highest education level: Not on file   Tobacco Use    Smoking status: Never Smoker    Smokeless tobacco: Never Used   Substance and Sexual Activity    Alcohol use: No    Drug use: Never      Past Surgical History:   Procedure Laterality Date    HX APPENDECTOMY      HX BLADDER SUSPENSION      HX BREAST LUMPECTOMY      HX CHOLECYSTECTOMY      HX HYSTERECTOMY        Past Medical History:   Diagnosis Date    Arthritis     Hematuria     Hypercholesteremia 10/17/2019    Kidney stones     Osteoporosis     Thyroid disease     UTI (urinary tract infection)         I have reviewed and agree with PFS and ROS and intake form in chart and the record furthermore I have reviewed prior medical record(s) regarding this patients care during this appointment. Review of Systems:   Patient is a pleasant appearing individual, appropriately dressed, well hydrated, well nourished, who is alert, appropriately oriented for age, and in no acute distress with a normal gait and normal affect who does not appear to be in any significant pain.    Physical Exam:  Left Ankle - No point tenderness, Full range of motion, No instability, No Weakness, No, skin lesions, No swelling, No instability, Grossly neurovascularly intact. Encounter Diagnoses     ICD-10-CM ICD-9-CM   1. Swollen ankles  M25.471 729.81    M25.472        HPI:  The patient is here with a chief complaint of right ankle pain, diagnosed with swelling of the right ankle. It has been the same. Pain is 5/10. ROS:  10-point review of systems is positive for joint swelling. Assessment/Plan:  Plan at this point, we are going to get the patient set up with appointment with foot and ankle specialist for the swelling that she has as soon as possible and go from there. As part of continued conservative pain management options the patient was advised to utilize Tylenol or OTC NSAIDS as long as it is not medically contraindicated. Return to Office: Follow-up and Dispositions    · Return for we will call. Scribed by Jazmyn Grove LPN as dictated by [facility] BETHEL Sánchez MD.  Documentation True and Accepted [facility] BETHEL Sánchez MD

## 2021-04-20 NOTE — PATIENT INSTRUCTIONS
Ankle: Exercises Introduction Here are some examples of exercises for you to try. The exercises may be suggested for a condition or for rehabilitation. Start each exercise slowly. Ease off the exercises if you start to have pain. You will be told when to start these exercises and which ones will work best for you. How to do the exercises 'Alphabet' exercise 1. Trace the alphabet with your toe. This helps your ankle move in all directions. Side-to-side knee swing exercise 1. Sit in a chair with your foot flat on the floor. 2. Slowly move your knee from side to side while keeping your foot pressed flat. 3. Continue this exercise for 2 to 3 minutes. Towel curl 1. While sitting, place your foot on a towel on the floor and scrunch the towel toward you with your toes. 2. Then use your toes to push the towel away from you. 3. Make this exercise more challenging by placing a weighted object, such as a soup can, on the other end of the towel. Towel stretch 1. Sit with your legs extended and knees straight. 2. Place a towel around your foot just under the toes. 3. Hold each end of the towel in each hand, with your hands above your knees. 4. Pull back with the towel so that your foot stretches toward you. 5. Hold the position for at least 15 to 30 seconds. 6. Repeat 2 to 4 times a session, up to 5 sessions a day. Ankle eversion exercise 1. Start by sitting with your foot flat on the floor and pushing it outward against an immovable object such as the wall or heavy furniture. Hold for about 6 seconds, then relax. Repeat 8 to 12 times. 2. After you feel comfortable with this, try using rubber tubing looped around the outside of your feet for resistance. Push your foot out to the side against the tubing, and then count to 10 as you slowly bring your foot back to the middle. Repeat 8 to 12 times. Isometric opposition exercises 1.  While sitting, put your feet together flat on the floor. 
2. Press your injured foot inward against your other foot. Hold for about 6 seconds, and relax. Repeat 8 to 12 times. 3. Then place the heel of your other foot on top of the injured one. Push down with the top heel while trying to push up with your injured foot. Hold for about 6 seconds, and relax. Repeat 8 to 12 times. Follow-up care is a key part of your treatment and safety. Be sure to make and go to all appointments, and call your doctor if you are having problems. It's also a good idea to know your test results and keep a list of the medicines you take. Where can you learn more? Go to http://www.gray.com/ Enter E469 in the search box to learn more about \"Ankle: Exercises. \" Current as of: November 16, 2020               Content Version: 12.8 © 6033-7157 [employer]. Care instructions adapted under license by [employer] (which disclaims liability or warranty for this information). If you have questions about a medical condition or this instruction, always ask your healthcare professional. [address] any warranty or liability for your use of this information.

## 2021-04-28 ENCOUNTER — OFFICE VISIT (OUTPATIENT)
Dept: SURGERY | Age: 66
End: 2021-04-28
Payer: MEDICARE

## 2021-04-28 DIAGNOSIS — R94.31 ABNORMAL EKG: Primary | ICD-10-CM

## 2021-04-28 DIAGNOSIS — M79.604 RIGHT LEG PAIN: ICD-10-CM

## 2021-04-28 DIAGNOSIS — R22.41 LEG MASS, RIGHT: ICD-10-CM

## 2021-04-28 DIAGNOSIS — I20.8 ATYPICAL ANGINA (HCC): ICD-10-CM

## 2021-04-28 DIAGNOSIS — E78.00 HYPERCHOLESTEREMIA: ICD-10-CM

## 2021-04-28 PROCEDURE — 1090F PRES/ABSN URINE INCON ASSESS: CPT | Performed by: SURGERY

## 2021-04-28 PROCEDURE — G8427 DOCREV CUR MEDS BY ELIG CLIN: HCPCS | Performed by: SURGERY

## 2021-04-28 PROCEDURE — 3017F COLORECTAL CA SCREEN DOC REV: CPT | Performed by: SURGERY

## 2021-04-28 PROCEDURE — G8419 CALC BMI OUT NRM PARAM NOF/U: HCPCS | Performed by: SURGERY

## 2021-04-28 PROCEDURE — 1101F PT FALLS ASSESS-DOCD LE1/YR: CPT | Performed by: SURGERY

## 2021-04-28 PROCEDURE — G8510 SCR DEP NEG, NO PLAN REQD: HCPCS | Performed by: SURGERY

## 2021-04-28 PROCEDURE — 99205 OFFICE O/P NEW HI 60 MIN: CPT | Performed by: SURGERY

## 2021-04-28 PROCEDURE — G8536 NO DOC ELDER MAL SCRN: HCPCS | Performed by: SURGERY

## 2021-04-28 NOTE — PROGRESS NOTES
Garrison Parker presents today for   Chief Complaint   Patient presents with    Leg Swelling     large area to right leg        Is someone accompanying this pt? No patient is alone for appointment     Is the patient using any DME equipment during OV? no    Depression Screening:  3 most recent PHQ Screens 4/28/2021   Little interest or pleasure in doing things Not at all   Feeling down, depressed, irritable, or hopeless Not at all   Total Score PHQ 2 0       Learning Assessment:  No flowsheet data found. Abuse Screening:  No flowsheet data found. Fall Risk  Fall Risk Assessment, last 12 mths 4/28/2021   Able to walk? Yes   Fall in past 12 months? 0   Do you feel unsteady? 0   Are you worried about falling 0       ADL  No flowsheet data found. Health Maintenance reviewed and discussed and ordered per Provider. Health Maintenance Due   Topic Date Due    Hepatitis C Screening  Never done    COVID-19 Vaccine (1) Never done    DTaP/Tdap/Td series (1 - Tdap) Never done    Shingrix Vaccine Age 50> (1 of 2) Never done    Colorectal Cancer Screening Combo  Never done    Breast Cancer Screen Mammogram  06/13/2018    Bone Densitometry (Dexa) Screening  Never done    Pneumococcal 65+ years (1 of 1 - PPSV23) Never done    Medicare Yearly Exam  Never done   . Coordination of Care:  1. Have you been to the ER, urgent care clinic since your last visit? Hospitalized since your last visit? no    2. Have you seen or consulted any other health care providers outside of the [address] [place] [address] since your last visit? Include any pap smears or colon screening.  no

## 2021-04-28 NOTE — PROGRESS NOTES
General Surgery Consult    Patricia Huerta  Admit date: (Not on file)    MRN: [id number]     : 1955     Age: 76 y.o. Attending Physician: Matty Thomason MD, [facility]      History of Present Illness:      Mahesh Horn is a 76 y.o. female who was referred to me by Dr. Vandana Herrera for evaluation of right leg pain and mass. The patient stated that last month she was working in the garden and there was a large branch that hit her right leg and caused 2 very small skin openings for which she bled slightly at that point. She stated that the second day she started having swelling and pain in the right leg. Since then the patient has stated that she has been having very difficult time getting to know what she has and she had been seen by multiple providers and she is kind of lost according to her and frustrated. She said that first she went to urgent care and they just asked her to put some ice packs and she continued to have the pain so she went to the emergency room and then also date seems that the ask her to just observe it for now and they referred her for an orthopedic surgeon. It seems she was seen by Dr. Jefferson Sheehan who is the orthopedic surgeon who saw her and they ordered an MRI of the right leg area. The MRI showed a collection about 5 x 3 cm consistent with a hematoma with her history of the trauma. She was referred from the orthopedic surgeon to the podiatry. Dr. Vandana Herrera saw her and he told her that she does not need a podiatrist but she needs a general surgeon so she was referred to me. The patient has stated that during this month and a half she has been feeling relatively better and she said that the swelling has decreased by half the size. She also noticed that there was ecchymosis on her foot most likely secondary from the hematoma on her leg. She said that now the ecchymosis is gone but she still have the swelling and some edema in her right lower extremity.   She has been elevating her foot and she has been trying ice pack. Patient Active Problem List    Diagnosis Date Noted    Abnormal EKG 02/20/2020    Hypercholesteremia 10/17/2019    Atypical angina ([facility] [address] 06/14/2019    Non-rheumatic mitral regurgitation 06/14/2019    Chest pain 06/14/2019    Hematuria     Kidney stones     Osteoporosis     UTI (urinary tract infection)     Aortic atherosclerosis ([facility] [address] 09/19/2016     Past Medical History:   Diagnosis Date    Arthritis     Hematuria     Hypercholesteremia 10/17/2019    Kidney stones     Osteoporosis     Thyroid disease     UTI (urinary tract infection)       Past Surgical History:   Procedure Laterality Date    HX APPENDECTOMY      HX BLADDER SUSPENSION      HX BREAST LUMPECTOMY      HX CHOLECYSTECTOMY      HX HYSTERECTOMY        Social History     Tobacco Use    Smoking status: Never Smoker    Smokeless tobacco: Never Used   Substance Use Topics    Alcohol use: No      Social History     Tobacco Use   Smoking Status Never Smoker   Smokeless Tobacco Never Used     Family History   Problem Relation Age of Onset    Other Mother         irregular heart beat    Stroke Father 61    Stroke Sister 72    Heart Surgery Sister 76    Coronary Artery Disease Sister       Current Outpatient Medications   Medication Sig    cetirizine (ZyrTEC) 10 mg tablet Take 10 mg by mouth daily.  loperamide-simethicone 2-125 mg tab Take 1 Tab by mouth.  acetaminophen (TYLENOL) 650 mg TbER Take 1,300 mg by mouth two (2) times a day.  levothyroxine (Synthroid) 88 mcg tablet Take 88 mcg by mouth Daily (before breakfast).  budesonide-formoteroL (Symbicort) 160-4.5 mcg/actuation HFAA Take 2 Puffs by inhalation.  polyethylene glycol (Miralax) 17 gram/dose powder Take 17 g by mouth two (2) times a day.  fluocinoNIDE (LIDEX) 0.05 % ointment Apply  to affected area two (2) times a day.  atorvastatin (LIPITOR) 10 mg tablet Take 1 Tab by mouth daily.     diclofenac (VOLTAREN) 1 % gel Apply to affected area as needed.  L.acidophilus/B. bifidum,longum (PROBIOTIC COLON SUPPORT PO) Take  by mouth daily.  omeprazole (PRILOSEC) 20 mg capsule Take 40 mg by mouth daily.  aspirin delayed-release 81 mg tablet Take  by mouth daily.  cyclobenzaprine (FLEXERIL) 10 mg tablet Take  by mouth as needed for Muscle Spasm(s).  gabapentin (Neurontin) 600 mg tablet Take 600 mg by mouth daily.  conjugated estrogens (PREMARIN) 0.625 mg/gram vaginal cream Insert 0.5 g into vagina daily.  ketorolac (TORADOL) 10 mg tablet Take 1 Tab by mouth every eight (8) hours as needed for Pain. No current facility-administered medications for this visit. Allergies   Allergen Reactions    Darvocet A500 [Propoxyphene N-Acetaminophen] Nausea and Vomiting    Amitriptyline Other (comments)    Demerol [Meperidine] Other (comments)          Review of Systems:  Constitutional: negative  Eyes: negative  Ears, Nose, Mouth, Throat, and Face: negative  Respiratory: negative  Cardiovascular: negative  Gastrointestinal: negative  Genitourinary:negative  Integument/Breast: negative  Hematologic/Lymphatic: negative  Musculoskeletal:positive for Right leg pain and swelling  Neurological: negative  Behavioral/Psychiatric: negative  Endocrine: negative  Allergic/Immunologic: negative    Objective: There were no vitals taken for this visit. Physical Exam:      General:  in no apparent distress, alert, oriented times 3, afebrile, normal vitals and cooperative   Eyes:  conjunctivae and sclerae normal, pupils equal, round, reactive to light   Throat & Neck: no erythema or exudates noted and neck supple and symmetrical; no palpable masses   Lungs:   clear to auscultation bilaterally   Heart:  Regular rate and rhythm   Abdomen:   flat, soft, nontender, nondistended, no masses or organomegaly   Extremities:   There is a soft tissue swelling on the anterior right lower leg consistent with her MRI findings and her history of trauma. There is no overlying skin changes and there is no ecchymosis. The area is soft consistent with a possible seroma/hematoma. There is mild swelling of the ankle on the right compared to the left but no clear pitting edema. Skin: Normal.       Imaging and Lab Review:     CBC:   Lab Results   Component Value Date/Time    WBC 6.0 10/16/2020 11:35 AM    RBC 4.33 10/16/2020 11:35 AM    HGB 13.4 10/16/2020 11:35 AM    HCT 40.3 10/16/2020 11:35 AM    PLATELET 941 02/19/6645 11:35 AM     BMP:   Lab Results   Component Value Date/Time    Glucose 91 10/16/2020 11:35 AM    Sodium 140 10/16/2020 11:35 AM    Potassium 4.1 10/16/2020 11:35 AM    Chloride 108 10/16/2020 11:35 AM    CO2 25 10/16/2020 11:35 AM    BUN 18 10/16/2020 11:35 AM    Creatinine 0.50 (L) 10/16/2020 11:35 AM    Calcium 9.7 10/16/2020 11:35 AM     CMP:  Lab Results   Component Value Date/Time    Glucose 91 10/16/2020 11:35 AM    Sodium 140 10/16/2020 11:35 AM    Potassium 4.1 10/16/2020 11:35 AM    Chloride 108 10/16/2020 11:35 AM    CO2 25 10/16/2020 11:35 AM    BUN 18 10/16/2020 11:35 AM    Creatinine 0.50 (L) 10/16/2020 11:35 AM    Calcium 9.7 10/16/2020 11:35 AM    Anion gap 7 10/16/2020 11:35 AM    BUN/Creatinine ratio 36 10/16/2020 11:35 AM    Alk. phosphatase 58 07/19/2019 08:49 AM    Protein, total 7.0 07/19/2019 08:49 AM    Albumin 4.4 07/19/2019 08:49 AM    Globulin 2.6 07/19/2019 08:49 AM    A-G Ratio 1.7 07/19/2019 08:49 AM       No results found for this or any previous visit (from the past 24 hour(s)). images and reports reviewed    Assessment:   Yari De Jesus is a 76 y.o. female who had a trauma to her right leg about 1 month and a half and she has been seen by 4 different providers and she had an MRI that showed most likely a hematoma. She was referred to me for further evaluation and management.   First I discussed with the patient the MRI finding and I explained to her that based on the history of trauma and the MRI finding this is most likely a hematoma with some collection in it and that is why it is taking some time to be completely absorbed. I explained to her that because she is feeling much better and because the swelling has decreased remarkably in size and the ecchymosis has been gone I believe that observing it medically is better than doing surgery. The patient is able to move and walk and I advised her that maybe we can continue to observe it hopefully it will continue to decrease in size and I advised her to have her leg elevated and to put warm compresses if needed. At the beginning the patient was frustrated and she wanted to proceed with surgery but I explained to her that with the surgery there is a risk of wound infection as well as possible problem with healing of the wound because of her swelling and edema. I explained to her that currently there is no skin involvement and because it has decreased in size and she is able to walk on it maybe we can observe it for few more weeks and decide. She agreed with this plan.     Plan:     Close observation for now  Leg elevation  Warm compresses  Follow-up with me in 2 weeks or earlier if needed    Please call me if you have any questions (cell phone: [phone])     Signed By: Jesus Douglas MD     April 28, 2021

## 2021-05-12 ENCOUNTER — OFFICE VISIT (OUTPATIENT)
Dept: SURGERY | Age: 66
End: 2021-05-12
Payer: MEDICARE

## 2021-05-12 DIAGNOSIS — R22.41 LEG MASS, RIGHT: Primary | ICD-10-CM

## 2021-05-12 DIAGNOSIS — M79.604 RIGHT LEG PAIN: ICD-10-CM

## 2021-05-12 DIAGNOSIS — I20.8 ATYPICAL ANGINA (HCC): ICD-10-CM

## 2021-05-12 PROCEDURE — G8427 DOCREV CUR MEDS BY ELIG CLIN: HCPCS | Performed by: SURGERY

## 2021-05-12 PROCEDURE — 1090F PRES/ABSN URINE INCON ASSESS: CPT | Performed by: SURGERY

## 2021-05-12 PROCEDURE — G8536 NO DOC ELDER MAL SCRN: HCPCS | Performed by: SURGERY

## 2021-05-12 PROCEDURE — 3017F COLORECTAL CA SCREEN DOC REV: CPT | Performed by: SURGERY

## 2021-05-12 PROCEDURE — G8432 DEP SCR NOT DOC, RNG: HCPCS | Performed by: SURGERY

## 2021-05-12 PROCEDURE — 99214 OFFICE O/P EST MOD 30 MIN: CPT | Performed by: SURGERY

## 2021-05-12 PROCEDURE — 1101F PT FALLS ASSESS-DOCD LE1/YR: CPT | Performed by: SURGERY

## 2021-05-12 PROCEDURE — G8419 CALC BMI OUT NRM PARAM NOF/U: HCPCS | Performed by: SURGERY

## 2021-05-12 NOTE — LETTER
5/12/2021 Patient: Mahesh Horn YOB: 1955 Date of Visit: 5/12/2021 Divya Zayas NP 
[id number] [place] [address] [address] Fax: [phone] Dear Divya Zayas NP, Thank you for referring Ms. Patricia Huerta to [address] [address] for evaluation. My notes for this consultation are attached. If you have questions, please do not hesitate to call me. I look forward to following your patient along with you. Sincerely, Matty Thomason MD

## 2021-05-12 NOTE — PROGRESS NOTES
Juan Watson presents today for   Chief Complaint   Patient presents with    Follow-up     Right ankle/shin area        Is someone accompanying this pt? Patient is alone for appt    Is the patient using any DME equipment during OV? no    Depression Screening:  3 most recent PHQ Screens 4/28/2021   Little interest or pleasure in doing things Not at all   Feeling down, depressed, irritable, or hopeless Not at all   Total Score PHQ 2 0       Learning Assessment:  No flowsheet data found. Abuse Screening:  No flowsheet data found. Fall Risk  Fall Risk Assessment, last 12 mths 4/28/2021   Able to walk? Yes   Fall in past 12 months? 0   Do you feel unsteady? 0   Are you worried about falling 0       ADL  No flowsheet data found. Health Maintenance reviewed and discussed and ordered per Provider. Health Maintenance Due   Topic Date Due    Hepatitis C Screening  Never done    COVID-19 Vaccine (1) Never done    DTaP/Tdap/Td series (1 - Tdap) Never done    Shingrix Vaccine Age 50> (1 of 2) Never done    Colorectal Cancer Screening Combo  Never done    Breast Cancer Screen Mammogram  06/13/2018    Bone Densitometry (Dexa) Screening  Never done    Pneumococcal 65+ years (1 of 1 - PPSV23) Never done    Medicare Yearly Exam  Never done   . Coordination of Care:  1. Have you been to the ER, urgent care clinic since your last visit? Hospitalized since your last visit? no    2. Have you seen or consulted any other health care providers outside of the [address] [place] [address] since your last visit? Include any pap smears or colon screening.  Mor Real PCP

## 2021-05-12 NOTE — PROGRESS NOTES
General Surgery Consult    Patricia Huerta  Admit date: (Not on file)    MRN: [id number]     : 1955     Age: 76 y.o. Attending Physician: Barbara Malone MD, MAI      History of Present Illness:      Paula Jeffery is a 76 y.o. female who is here today for follow-up on her right leg mass and pain. I have seen the patient last month and at that point she was referred to me by Dr. Yoly Verdin for evaluation of right leg pain and mass. The patient stated that  she was working in the garden and there was a large branch that hit her right leg and caused 2 very small skin openings for which she bled slightly at that point. She stated that the second day she started having swelling and pain in the right leg. She said that first she went to urgent care and they just asked her to put some ice packs and she continued to have the pain so she went to the emergency room where according to her they tried to insert a needle to aspirate but nothing was coming out so she was referred for orthopedic team.  She was seen by Dr. Bisi Kingsley, an orthopedic surgeon, who reviewed MRI which showed a collection about 5 x 3 cm consistent with a hematoma with her history of the trauma. Who referred her for podiatry. Dr. Yoly Verdin saw her and he told her that she needs a general surgeon so she was referred to me. When I see the patient last month we decided to observe the swelling and the mass because it has been decreasing slightly in size and the ecchymosis has been also improving. Also the patient works as a housekeeping and she is already been diagnosed recently with a skin cancer so she would like not to skip any more work. Currently the patient stated that she is feeling the same and she said that maybe the swelling is slightly less and maybe the pain is less however she still experiences pain at the end of the day after a long day at work.      Patient Active Problem List    Diagnosis Date Noted    Abnormal EKG 2020    Hypercholesteremia 10/17/2019    Atypical angina ([facility] 06/14/2019    Non-rheumatic mitral regurgitation 06/14/2019    Chest pain 06/14/2019    Hematuria     Kidney stones     Osteoporosis     UTI (urinary tract infection)     [facility] 09/19/2016     Past Medical History:   Diagnosis Date    Arthritis     Endometriosis     Hematuria     Hypercholesteremia 10/17/2019    Kidney stones     Osteoporosis     Pulmonary nodules     Thyroid disease     UTI (urinary tract infection)       Past Surgical History:   Procedure Laterality Date    HX APPENDECTOMY      HX BLADDER SUSPENSION      HX BREAST LUMPECTOMY      HX CHOLECYSTECTOMY      HX HYSTERECTOMY        Social History     Tobacco Use    Smoking status: Never Smoker    Smokeless tobacco: Never Used   Substance Use Topics    Alcohol use: No      Social History     Tobacco Use   Smoking Status Never Smoker   Smokeless Tobacco Never Used     Family History   Problem Relation Age of Onset    Other Mother         irregular heart beat    Stroke Father 61    Stroke Sister 72    Heart Surgery Sister 76    Coronary Artery Disease Sister       Current Outpatient Medications   Medication Sig    ketorolac (TORADOL) 10 mg tablet Take 1 Tab by mouth every eight (8) hours as needed for Pain.  cetirizine (ZyrTEC) 10 mg tablet Take 10 mg by mouth daily.  loperamide-simethicone 2-125 mg tab Take 1 Tab by mouth.  acetaminophen (TYLENOL) 650 mg TbER Take 1,300 mg by mouth two (2) times a day.  levothyroxine (Synthroid) 88 mcg tablet Take 88 mcg by mouth Daily (before breakfast).  budesonide-formoteroL (Symbicort) 160-4.5 mcg/actuation HFAA Take 2 Puffs by inhalation.  polyethylene glycol (Miralax) 17 gram/dose powder Take 17 g by mouth two (2) times a day.  fluocinoNIDE (LIDEX) 0.05 % ointment Apply  to affected area two (2) times a day.  atorvastatin (LIPITOR) 10 mg tablet Take 1 Tab by mouth daily.     diclofenac (VOLTAREN) 1 % gel Apply  to affected area as needed.  L.acidophilus/B. bifidum,longum (PROBIOTIC COLON SUPPORT PO) Take  by mouth daily.  omeprazole (PRILOSEC) 20 mg capsule Take 40 mg by mouth daily.  aspirin delayed-release 81 mg tablet Take  by mouth daily.  cyclobenzaprine (FLEXERIL) 10 mg tablet Take  by mouth as needed for Muscle Spasm(s).  gabapentin (Neurontin) 600 mg tablet Take 600 mg by mouth daily.  conjugated estrogens (PREMARIN) 0.625 mg/gram vaginal cream Insert 0.5 g into vagina daily. No current facility-administered medications for this visit. Allergies   Allergen Reactions    Darvocet A500 [Propoxyphene N-Acetaminophen] Nausea and Vomiting    Amitriptyline Other (comments)    Demerol [Meperidine] Other (comments)          Review of Systems:  Constitutional: negative  Eyes: negative  Ears, Nose, Mouth, Throat, and Face: negative  Respiratory: negative  Cardiovascular: negative  Gastrointestinal: negative  Genitourinary:negative  Integument/Breast: negative  Hematologic/Lymphatic: negative  Musculoskeletal:positive for Right leg pain and swelling  Neurological: negative  Behavioral/Psychiatric: negative  Endocrine: negative  Allergic/Immunologic: negative    Objective: There were no vitals taken for this visit. Physical Exam:      General:  in no apparent distress, alert, oriented times 3 and cooperative   Eyes:  conjunctivae and sclerae normal, pupils equal, round, reactive to light   Throat & Neck: no erythema or exudates noted and neck supple and symmetrical; no palpable masses   Lungs:   clear to auscultation bilaterally   Heart:  Regular rate and rhythm   Abdomen:   flat, soft, nontender, nondistended, no masses or organomegaly   Extremities: There is a soft tissue swelling on the anterior right lower leg consistent with her MRI findings and her history of trauma. There is no overlying skin changes and there is no ecchymosis.   The area is soft consistent with a possible seroma/hematoma. There is mild swelling of the ankle on the right compared to the left but no clear pitting edema. Skin: Normal.       Imaging and Lab Review:     CBC:   Lab Results   Component Value Date/Time    WBC 6.0 10/16/2020 11:35 AM    RBC 4.33 10/16/2020 11:35 AM    HGB 13.4 10/16/2020 11:35 AM    HCT 40.3 10/16/2020 11:35 AM    PLATELET 072 06/39/6943 11:35 AM     BMP:   Lab Results   Component Value Date/Time    Glucose 91 10/16/2020 11:35 AM    Sodium 140 10/16/2020 11:35 AM    Potassium 4.1 10/16/2020 11:35 AM    Chloride 108 10/16/2020 11:35 AM    CO2 25 10/16/2020 11:35 AM    BUN 18 10/16/2020 11:35 AM    Creatinine 0.50 (L) 10/16/2020 11:35 AM    Calcium 9.7 10/16/2020 11:35 AM     CMP:  Lab Results   Component Value Date/Time    Glucose 91 10/16/2020 11:35 AM    Sodium 140 10/16/2020 11:35 AM    Potassium 4.1 10/16/2020 11:35 AM    Chloride 108 10/16/2020 11:35 AM    CO2 25 10/16/2020 11:35 AM    BUN 18 10/16/2020 11:35 AM    Creatinine 0.50 (L) 10/16/2020 11:35 AM    Calcium 9.7 10/16/2020 11:35 AM    Anion gap 7 10/16/2020 11:35 AM    BUN/Creatinine ratio 36 10/16/2020 11:35 AM    Alk. phosphatase 58 07/19/2019 08:49 AM    Protein, total 7.0 07/19/2019 08:49 AM    Albumin 4.4 07/19/2019 08:49 AM    Globulin 2.6 07/19/2019 08:49 AM    A-G Ratio 1.7 07/19/2019 08:49 AM       No results found for this or any previous visit (from the past 24 hour(s)). images and reports reviewed    Assessment:   Ani Swartz is a 76 y.o. female who had a trauma to her right leg about 2 months and a half and she has been seen by multiple providers and into the same emergency rooms for evaluation of swelling in the right lower extremity at the site of the trauma. Based on the history and an MRI this is most likely a hematoma that has been decreasing very slowly. I discussed with the patient again about the MRI findings and I explained to her that this represent a hematoma .    The patient kind of anxious and she would like it to be gone as soon as possible but she understand that we need to be patient and hopefully this will continue to decrease with time. However I explained to her that we can take her for surgery and try to do an incision and try to drain it but this would put her at risk for wound that may not heal.  I still believe that because is not getting worse and actually things are getting slightly better that we should wait and observe it and I advised her to put a compressive dressing in the morning before she goes to work. The patient is able to move and walk and I advised her that maybe we can continue to observe it hopefully it will continue to decrease in size and I advised her to have her leg elevated and to put warm compresses if needed. She agreed with this plan.     Plan:     Close observation for now  Leg elevation  Warm compresses  Compression hose  Follow-up with me in 1 to 2 months or earlier if needed    Please call me if you have any questions (cell phone: [phone])     Signed By: Марина Cabrera MD     May 12, 2021

## 2021-06-01 DIAGNOSIS — E78.00 HYPERCHOLESTEREMIA: ICD-10-CM

## 2021-06-01 RX ORDER — ATORVASTATIN CALCIUM 10 MG/1
10 TABLET, FILM COATED ORAL DAILY
Qty: 90 TABLET | Refills: 3 | Status: SHIPPED | OUTPATIENT
Start: 2021-06-01 | End: 2022-05-27 | Stop reason: SDUPTHER

## 2021-07-20 ENCOUNTER — OFFICE VISIT (OUTPATIENT)
Dept: ORTHOPEDIC SURGERY | Age: 66
End: 2021-07-20
Payer: MEDICARE

## 2021-07-20 DIAGNOSIS — M17.11 PRIMARY OSTEOARTHRITIS OF RIGHT KNEE: Primary | ICD-10-CM

## 2021-07-20 DIAGNOSIS — M25.561 RIGHT KNEE PAIN, UNSPECIFIED CHRONICITY: ICD-10-CM

## 2021-07-20 PROCEDURE — G8419 CALC BMI OUT NRM PARAM NOF/U: HCPCS | Performed by: ORTHOPAEDIC SURGERY

## 2021-07-20 PROCEDURE — G8427 DOCREV CUR MEDS BY ELIG CLIN: HCPCS | Performed by: ORTHOPAEDIC SURGERY

## 2021-07-20 PROCEDURE — G8536 NO DOC ELDER MAL SCRN: HCPCS | Performed by: ORTHOPAEDIC SURGERY

## 2021-07-20 PROCEDURE — 1101F PT FALLS ASSESS-DOCD LE1/YR: CPT | Performed by: ORTHOPAEDIC SURGERY

## 2021-07-20 PROCEDURE — 20611 DRAIN/INJ JOINT/BURSA W/US: CPT | Performed by: ORTHOPAEDIC SURGERY

## 2021-07-20 PROCEDURE — 1090F PRES/ABSN URINE INCON ASSESS: CPT | Performed by: ORTHOPAEDIC SURGERY

## 2021-07-20 PROCEDURE — 99214 OFFICE O/P EST MOD 30 MIN: CPT | Performed by: ORTHOPAEDIC SURGERY

## 2021-07-20 PROCEDURE — 3017F COLORECTAL CA SCREEN DOC REV: CPT | Performed by: ORTHOPAEDIC SURGERY

## 2021-07-20 PROCEDURE — G8432 DEP SCR NOT DOC, RNG: HCPCS | Performed by: ORTHOPAEDIC SURGERY

## 2021-07-20 RX ORDER — LIDOCAINE HYDROCHLORIDE 10 MG/ML
9 INJECTION INFILTRATION; PERINEURAL ONCE
Status: COMPLETED | OUTPATIENT
Start: 2021-07-20 | End: 2021-07-20

## 2021-07-20 RX ORDER — TRIAMCINOLONE ACETONIDE 40 MG/ML
40 INJECTION, SUSPENSION INTRA-ARTICULAR; INTRAMUSCULAR ONCE
Status: COMPLETED | OUTPATIENT
Start: 2021-07-20 | End: 2021-07-20

## 2021-07-20 RX ADMIN — TRIAMCINOLONE ACETONIDE 40 MG: 40 INJECTION, SUSPENSION INTRA-ARTICULAR; INTRAMUSCULAR at 10:15

## 2021-07-20 RX ADMIN — LIDOCAINE HYDROCHLORIDE 9 ML: 10 INJECTION INFILTRATION; PERINEURAL at 10:15

## 2021-07-20 NOTE — PATIENT INSTRUCTIONS
Knee Arthritis: Care Instructions  Your Care Instructions     Knee arthritis is a breakdown of the cartilage that cushions your knee joint. When the cartilage wears down, your bones rub against each other. This causes pain and stiffness. Knee arthritis tends to get worse with time. Treatment for knee arthritis involves reducing pain, making the leg muscles stronger, and staying at a healthy body weight. The treatment usually does not improve the health of the cartilage, but it can reduce pain and improve how well your knee works. You can take simple measures to protect your knee joints, ease your pain, and help you stay active. Follow-up care is a key part of your treatment and safety. Be sure to make and go to all appointments, and call your doctor if you are having problems. It's also a good idea to know your test results and keep a list of the medicines you take. How can you care for yourself at home? · Know that knee arthritis will cause more pain on some days than on others. · Stay at a healthy weight. Lose weight if you are overweight. When you stand up, the pressure on your knees from every pound of body weight is multiplied four times. So if you lose 10 pounds, you will reduce the pressure on your knees by 40 pounds. · Talk to your doctor or physical therapist about exercises that will help ease joint pain. ? Stretch to help prevent stiffness and to prevent injury before you exercise. You may enjoy gentle forms of yoga to help keep your knee joints and muscles flexible. ? Walk instead of jog.  ? Ride a bike. This makes your thigh muscles stronger and takes pressure off your knee. ? Wear well-fitting and comfortable shoes. ? Exercise in chest-deep water. This can help you exercise longer with less pain. ? Avoid exercises that include squatting or kneeling. They can put a lot of strain on your knees.   ? Talk to your doctor to make sure that the exercise you do is not making the arthritis worse.  · Do not sit for long periods of time. Try to walk once in a while to keep your knee from getting stiff. · Ask your doctor or physical therapist whether shoe inserts may reduce your arthritis pain. · If you can afford it, get new athletic shoes at least every year. This can help reduce the strain on your knees. · Use a device to help you do everyday activities. ? A cane or walking stick can help you keep your balance when you walk. Hold the cane or walking stick in the hand opposite the painful knee. ? If you feel like you may fall when you walk, try using crutches or a front-wheeled walker. These can prevent falls that could cause more damage to your knee. ? A knee brace may help keep your knee stable and prevent pain. ? You also can use other things to make life easier, such as a higher toilet seat and handrails in the bathtub or shower. · Take pain medicines exactly as directed. ? Do not wait until you are in severe pain. You will get better results if you take it sooner. ? If you are not taking a prescription pain medicine, take an over-the-counter medicine such as acetaminophen (Tylenol), ibuprofen (Advil, Motrin), or naproxen (Aleve). Read and follow all instructions on the label. ? Do not take two or more pain medicines at the same time unless the doctor told you to. Many pain medicines have acetaminophen, which is Tylenol. Too much acetaminophen (Tylenol) can be harmful. ? Tell your doctor if you take a blood thinner, have diabetes, or have allergies to shellfish. · Ask your doctor if you might benefit from a shot of steroid medicine into your knee. This may provide pain relief for several months. · Many people take the supplements glucosamine and chondroitin for osteoarthritis. Some people feel they help, but the medical research does not show that they work. Talk to your doctor before you take these supplements. When should you call for help?    Call your doctor now or seek immediate medical care if:    · You have sudden swelling, warmth, or pain in your knee.     · You have knee pain and a fever or rash.     · You have such bad pain that you cannot use your knee. Watch closely for changes in your health, and be sure to contact your doctor if you have any problems. Where can you learn more? Go to http://www.gray.com/  Enter W187 in the search box to learn more about \"Knee Arthritis: Care Instructions. \"  Current as of: August 5, 2020               Content Version: 12.8  © 2006-2021 [employer]. Care instructions adapted under license by [employer] (which disclaims liability or warranty for this information). If you have questions about a medical condition or this instruction, always ask your healthcare professional. [address] any warranty or liability for your use of this information.

## 2021-07-20 NOTE — PROGRESS NOTES
Name: Cherelle Huerta    : 1955     Service Dept: [facility]    Patient's Pharmacies:    [address] [facility], [address]  [place] [address]  Phone: [phone] Fax: [phone]       Chief Complaint   Patient presents with    Knee Pain        There were no vitals taken for this visit. Allergies   Allergen Reactions    Darvocet A500 [Propoxyphene N-Acetaminophen] Nausea and Vomiting    Amitriptyline Other (comments)    Demerol [Meperidine] Other (comments)      Current Outpatient Medications   Medication Sig Dispense Refill    atorvastatin (LIPITOR) 10 mg tablet Take 1 Tablet by mouth daily. 90 Tablet 3    ketorolac (TORADOL) 10 mg tablet Take 1 Tab by mouth every eight (8) hours as needed for Pain. 15 Tab 0    cetirizine (ZyrTEC) 10 mg tablet Take 10 mg by mouth daily.  loperamide-simethicone 2-125 mg tab Take 1 Tab by mouth.  acetaminophen (TYLENOL) 650 mg TbER Take 1,300 mg by mouth two (2) times a day.  levothyroxine (Synthroid) 88 mcg tablet Take 88 mcg by mouth Daily (before breakfast).  budesonide-formoteroL (Symbicort) 160-4.5 mcg/actuation HFAA Take 2 Puffs by inhalation.  polyethylene glycol (Miralax) 17 gram/dose powder Take 17 g by mouth two (2) times a day.  fluocinoNIDE (LIDEX) 0.05 % ointment Apply  to affected area two (2) times a day.  diclofenac (VOLTAREN) 1 % gel Apply  to affected area as needed.  L.acidophilus/B. bifidum,longum (PROBIOTIC COLON SUPPORT PO) Take  by mouth daily.  omeprazole (PRILOSEC) 20 mg capsule Take 40 mg by mouth daily.  aspirin delayed-release 81 mg tablet Take  by mouth daily.  cyclobenzaprine (FLEXERIL) 10 mg tablet Take  by mouth as needed for Muscle Spasm(s).  gabapentin (Neurontin) 600 mg tablet Take 600 mg by mouth daily.       conjugated estrogens (PREMARIN) 0.625 mg/gram vaginal cream Insert 0.5 g into vagina daily. Patient Active Problem List   Diagnosis Code    Hematuria R31.9    Kidney stones N20.0    Osteoporosis M81.0    UTI (urinary tract infection) N39.0    Atypical angina ([address] I20.8    Non-rheumatic mitral regurgitation I34.0    Chest pain R07.9    Hypercholesteremia E78.00    Abnormal EKG R94.31    Aortic atherosclerosis (HCC) I70.0      Family History   Problem Relation Age of Onset    Other Mother         irregular heart beat    Stroke Father 61    Stroke Sister 72    Heart Surgery Sister 76    Coronary Artery Disease Sister       Social History     Socioeconomic History    Marital status:      Spouse name: Not on file    Number of children: Not on file    Years of education: Not on file    Highest education level: Not on file   Tobacco Use    Smoking status: Never Smoker    Smokeless tobacco: Never Used   Vaping Use    Vaping Use: Never used   Substance and Sexual Activity    Alcohol use: No    Drug use: Never     Social Determinants of Health     Financial Resource Strain:     Difficulty of Paying Living Expenses:    Food Insecurity:     Worried About Running Out of Food in the Last Year:     Ran Out of Food in the Last Year:    Transportation Needs:     Lack of Transportation (Medical):      Lack of Transportation (Non-Medical):    Physical Activity:     Days of Exercise per Week:     Minutes of Exercise per Session:    Stress:     Feeling of Stress :    Social Connections:     Frequency of Communication with Friends and Family:     Frequency of Social Gatherings with Friends and Family:     Attends [employer] Services:     Active Member of Clubs or Organizations:     Attends Club or Organization Meetings:     Marital Status:       Past Surgical History:   Procedure Laterality Date    HX APPENDECTOMY      HX BLADDER SUSPENSION      HX BREAST LUMPECTOMY      HX CHOLECYSTECTOMY      HX HYSTERECTOMY        Past Medical History:   Diagnosis Date    Arthritis     Endometriosis     Hematuria     Hypercholesteremia 10/17/2019    Kidney stones     Osteoporosis     Pulmonary nodules     Thyroid disease     UTI (urinary tract infection)         I have reviewed and agree with [address] [place] [address] Nw and ROS and intake form in chart and the record furthermore I have reviewed prior medical record(s) regarding this patients care during this appointment. Review of Systems:   Patient is a pleasant appearing individual, appropriately dressed, well hydrated, well nourished, who is alert, appropriately oriented for age, and in no acute distress with a normal gait and normal affect who does not appear to be in any significant pain. Physical Exam:  Right Knee -Decrease range of motion with flexion, Knee arc of greater than 50 degrees, Some crepitation, Grossly neurovascularly intact, Good cap refill, No skin lesion, Moderate swelling, No gross instability, Some quadriceps weakness, Kellgren and Juvenal at least grade 3    Left Knee - Full Range of Motion, No crepitation, Grossly neurovascularly intact, Good cap refill, No skin lesion, No swelling, No gross instability, No quadriceps weakness    Procedure Documentation:    I discussed in detail the risks, benefits and complications of an injection which included but are not limited to infection, skin reactions, hot swollen joint, and anaphylaxis with the patient. The patient verbalized understanding and gave informed consent for the injection. The patient's knee was flexed to 90° and the skin prepped using sterile alcohol solution. A sterile needle was inserted into the right knee and the mixture of 9 mL Lidocaine 1%, 1 mL Kenalog 40 mg was injected under sterile technique. The needle was withdrawn and the puncture site sealed with a Band-Aid.       Technique: Under sterile conditions a [employer] ultrasound unit with a variable frequency (7.0-14.0 MHz) linear transducer was used to localize the placement of needle into the right knee joint. Findings: Successful needle placement for knee injection. Final images were taken and saved for permanent record. The patient tolerated the injection well. The patient was instructed to call the office immediately if there is any pain, redness, warmth, fever, or chills. Encounter Diagnoses     ICD-10-CM ICD-9-CM   1. Primary osteoarthritis of right knee  M17.11 715.16   2. Right knee pain, unspecified chronicity  M25.561 719.46       HPI:  The patient is here with a chief complaint of right knee pain, throbbing burning pain. It is the same. Nothing has helped. Pain is 5/10. X-rays are positive in the past for right knee severe OA. Assessment/Plan:  Plan would be for cortisone injection today. See the patient back in 2 weeks for routine followup and go from there. As part of continued conservative pain management options the patient was advised to utilize Tylenol or OTC NSAIDS as long as it is not medically contraindicated. Return to Office: Follow-up and Dispositions    · Return in about 2 weeks (around 8/3/2021). Administrations This Visit     lidocaine (XYLOCAINE) 10 mg/mL (1 %) injection 9 mL     Admin Date  07/20/2021 Action  Given Dose  9 mL Route  Other Administered By  Dusty Rojas          triamcinolone acetonide (KENALOG-40) 40 mg/mL injection 40 mg     Admin Date  07/20/2021 Action  Given Dose  40 mg Route  Intra artICUlar Administered By  Dusty Rojas               Scribed by Suzi Mckeon as dictated by Pippa Granados. Akiko Salinas MD.  Documentation True and Accepted Freddy Salinsa MD

## 2021-07-20 NOTE — LETTER
Stacy Sanchez Colon   1955   [id number]       7/20/2021       I hereby authorize and direct Freddy Trinh MD, Peter Sims, and whomever he may designate as his associate to perform upon myself the following procedure:    Injection of: Kenalog Right Knee. If any unforeseen condition arises in the course of the procedure, I further authorize him and his associated and/or assistant(s) to do whatever he/she deems advisable. The nature, purpose, benefits, risks, side effects, likelihood of achieving goals, and potential problems that might occur during recuperation, risks for not receiving the proposed care, treatment and services and alternatives of the procedure have been fully explained to me by my physician including, but not limited to:    Swelling, joint pain, skin pigment changes, worsening of condition, and failure to improve. I acknowledge that no guarantee or assurance has been made to me as to the results that may be obtained or the likelihood of success.                 _______________________________________     Signature of patient or authorized representative                [employer] [facility] fax: [phone]

## 2021-07-27 ENCOUNTER — HOSPITAL ENCOUNTER (OUTPATIENT)
Dept: LAB | Age: 66
Discharge: HOME | End: 2021-07-27
Payer: MEDICARE

## 2021-07-27 DIAGNOSIS — E78.00 HYPERCHOLESTEREMIA: ICD-10-CM

## 2021-07-27 LAB
ALBUMIN SERPL-MCNC: 4.2 G/DL (ref 3.5–4.7)
ALBUMIN/GLOB SERPL: 1.6 {RATIO}
ALP SERPL-CCNC: 56 U/L (ref 38–126)
ALT SERPL-CCNC: 18 U/L (ref 3–52)
AST SERPL W P-5'-P-CCNC: 17 U/L (ref 14–74)
BILIRUB DIRECT SERPL-MCNC: 0.1 MG/DL (ref 0–0.3)
BILIRUB SERPL-MCNC: 1.2 MG/DL (ref 0.2–1)
CHOLEST SERPL-MCNC: 119 MG/DL
GLOBULIN SER CALC-MCNC: 2.7 G/DL
HDLC SERPL-MCNC: 40 MG/DL (ref 40–60)
HDLC SERPL: 3 {RATIO} (ref 0–5)
LDLC SERPL CALC-MCNC: 67.6 MG/DL (ref 0–100)
LIPID PROFILE,FLP: NORMAL
PROT SERPL-MCNC: 6.9 G/DL (ref 6.1–8.4)
TRIGL SERPL-MCNC: 57 MG/DL (ref ?–150)
VLDLC SERPL CALC-MCNC: 11.4 MG/DL

## 2021-07-27 PROCEDURE — 80061 LIPID PANEL: CPT

## 2021-07-27 PROCEDURE — 36415 COLL VENOUS BLD VENIPUNCTURE: CPT

## 2021-07-27 PROCEDURE — 80076 HEPATIC FUNCTION PANEL: CPT

## 2021-07-29 ENCOUNTER — APPOINTMENT (OUTPATIENT)
Dept: GENERAL RADIOLOGY | Age: 66
End: 2021-07-29
Attending: FAMILY MEDICINE
Payer: MEDICARE

## 2021-07-29 ENCOUNTER — HOSPITAL ENCOUNTER (EMERGENCY)
Age: 66
Discharge: HOME | End: 2021-07-29
Attending: FAMILY MEDICINE
Payer: MEDICARE

## 2021-07-29 VITALS
SYSTOLIC BLOOD PRESSURE: 149 MMHG | HEIGHT: 64 IN | HEART RATE: 69 BPM | DIASTOLIC BLOOD PRESSURE: 77 MMHG | OXYGEN SATURATION: 97 % | RESPIRATION RATE: 18 BRPM | WEIGHT: 150 LBS | TEMPERATURE: 98.4 F | BODY MASS INDEX: 25.61 KG/M2

## 2021-07-29 DIAGNOSIS — S20.219A CONTUSION OF CHEST WALL, UNSPECIFIED LATERALITY, INITIAL ENCOUNTER: Primary | ICD-10-CM

## 2021-07-29 PROCEDURE — 74011250637 HC RX REV CODE- 250/637: Performed by: FAMILY MEDICINE

## 2021-07-29 PROCEDURE — 93005 ELECTROCARDIOGRAM TRACING: CPT

## 2021-07-29 PROCEDURE — 71111 X-RAY EXAM RIBS/CHEST4/> VWS: CPT

## 2021-07-29 PROCEDURE — 99283 EMERGENCY DEPT VISIT LOW MDM: CPT

## 2021-07-29 RX ORDER — KETOROLAC TROMETHAMINE 10 MG/1
10 TABLET, FILM COATED ORAL ONCE
Status: COMPLETED | OUTPATIENT
Start: 2021-07-29 | End: 2021-07-29

## 2021-07-29 RX ORDER — CYCLOBENZAPRINE HCL 10 MG
10 TABLET ORAL
Status: COMPLETED | OUTPATIENT
Start: 2021-07-29 | End: 2021-07-29

## 2021-07-29 RX ORDER — NAPROXEN 500 MG/1
500 TABLET ORAL 2 TIMES DAILY WITH MEALS
Qty: 20 TABLET | Refills: 0 | Status: SHIPPED | OUTPATIENT
Start: 2021-07-29 | End: 2021-08-08

## 2021-07-29 RX ORDER — CYCLOBENZAPRINE HCL 10 MG
10 TABLET ORAL
Qty: 30 TABLET | Refills: 0 | Status: SHIPPED | OUTPATIENT
Start: 2021-07-29

## 2021-07-29 RX ADMIN — CYCLOBENZAPRINE 10 MG: 10 TABLET, FILM COATED ORAL at 11:24

## 2021-07-29 RX ADMIN — KETOROLAC TROMETHAMINE 10 MG: 10 TABLET, FILM COATED ORAL at 11:24

## 2021-07-29 NOTE — ED TRIAGE NOTES
Pt states she has had chest pain since yesterday at 2 pm when she picked up a heavy log and it rolled and hit her in the chest. Pt has bruising across the top of her chest and her R arm. Pt states it hurts to move and it hurts when she breathes.

## 2021-07-29 NOTE — ED PROVIDER NOTES
EMERGENCY DEPARTMENT HISTORY AND PHYSICAL EXAM      Date: 7/29/2021  Patient Name: Melany Huerta    History of Presenting Illness     Chief Complaint   Patient presents with    Chest Pain       History Provided By: Patient    HPI: Denins Toledo, 76 y.o. female with a past medical history significant hyperlipidemia and hypothyroidism presents to the ED with cc of chest bruising and pain. Patient was moving logs yesterday when a log hit her in the chest and awkward fashion. Left bruising. She says it hard to take a deep breath. She says the pain is an 8 out of 10. She did not fall. She did use meds without relief. There are no other complaints, changes, or physical findings at this time. PCP: Olaf Muñiz MD    No current facility-administered medications on file prior to encounter. Current Outpatient Medications on File Prior to Encounter   Medication Sig Dispense Refill    atorvastatin (LIPITOR) 10 mg tablet Take 1 Tablet by mouth daily. 90 Tablet 3    [DISCONTINUED] ketorolac (TORADOL) 10 mg tablet Take 1 Tab by mouth every eight (8) hours as needed for Pain. 15 Tab 0    cetirizine (ZyrTEC) 10 mg tablet Take 10 mg by mouth daily.  loperamide-simethicone 2-125 mg tab Take 1 Tab by mouth.  acetaminophen (TYLENOL) 650 mg TbER Take 1,300 mg by mouth two (2) times a day.  levothyroxine (Synthroid) 88 mcg tablet Take 88 mcg by mouth Daily (before breakfast).  budesonide-formoteroL (Symbicort) 160-4.5 mcg/actuation HFAA Take 2 Puffs by inhalation.  polyethylene glycol (Miralax) 17 gram/dose powder Take 17 g by mouth two (2) times a day.  fluocinoNIDE (LIDEX) 0.05 % ointment Apply  to affected area two (2) times a day.  diclofenac (VOLTAREN) 1 % gel Apply  to affected area as needed.  L.acidophilus/B. bifidum,longum (PROBIOTIC COLON SUPPORT PO) Take  by mouth daily.  omeprazole (PRILOSEC) 20 mg capsule Take 40 mg by mouth daily.       aspirin delayed-release 81 mg tablet Take  by mouth daily.  [DISCONTINUED] cyclobenzaprine (FLEXERIL) 10 mg tablet Take  by mouth as needed for Muscle Spasm(s).  gabapentin (Neurontin) 600 mg tablet Take 600 mg by mouth daily.  conjugated estrogens (PREMARIN) 0.625 mg/gram vaginal cream Insert 0.5 g into vagina daily. Past History     Past Medical History:  Past Medical History:   Diagnosis Date    Arthritis     Endometriosis     Hematuria     Hypercholesteremia 10/17/2019    Kidney stones     Osteoporosis     Pulmonary nodules     Thyroid disease     UTI (urinary tract infection)        Past Surgical History:  Past Surgical History:   Procedure Laterality Date    HX APPENDECTOMY      HX BLADDER SUSPENSION      HX BREAST LUMPECTOMY      HX CHOLECYSTECTOMY      HX HYSTERECTOMY         Family History:  Family History   Problem Relation Age of Onset    Other Mother         irregular heart beat    Stroke Father 61    Stroke Sister 72    Heart Surgery Sister 76    Coronary Artery Disease Sister        Social History:  Social History     Tobacco Use    Smoking status: Never Smoker    Smokeless tobacco: Never Used   Vaping Use    Vaping Use: Never used   Substance Use Topics    Alcohol use: No    Drug use: Never       Allergies: Allergies   Allergen Reactions    Darvocet A500 [Propoxyphene N-Acetaminophen] Nausea and Vomiting    Amitriptyline Other (comments)    Demerol [Meperidine] Other (comments)         Review of Systems     Review of Systems   Constitutional: Negative for fatigue and fever. HENT: Negative for rhinorrhea and sore throat. Respiratory: Negative for cough and shortness of breath. Cardiovascular: Negative for chest pain and palpitations. Chest wall pain   Gastrointestinal: Negative for abdominal pain, diarrhea, nausea and vomiting. Genitourinary: Negative for difficulty urinating and dysuria.    Musculoskeletal: Negative for arthralgias and myalgias. Skin: Negative for color change and rash. Neurological: Negative for light-headedness and headaches. Physical Exam     Physical Exam  Vitals and nursing note reviewed. Constitutional:       General: She is awake. She is not in acute distress. Appearance: Normal appearance. She is well-developed and normal weight. She is not ill-appearing, toxic-appearing or diaphoretic. Interventions: Face mask in place. HENT:      Head: Normocephalic and atraumatic. Eyes:      Conjunctiva/sclera: Conjunctivae normal.      Pupils: Pupils are equal, round, and reactive to light. Cardiovascular:      Rate and Rhythm: Normal rate and regular rhythm. Pulses: Normal pulses. Heart sounds: Normal heart sounds. Pulmonary:      Effort: Pulmonary effort is normal.      Breath sounds: Normal breath sounds. Chest:      Comments: Bruising and tenderness bilaterally on the chest.  Abdominal:      General: Abdomen is flat. Palpations: Abdomen is soft. Tenderness: There is no abdominal tenderness. Musculoskeletal:      Cervical back: Normal range of motion and neck supple. Comments: Ecchymosis of the right upper arm. Skin:     General: Skin is warm and dry. Neurological:      General: No focal deficit present. Mental Status: She is alert and oriented to person, place, and time. GCS: GCS eye subscore is 4. GCS verbal subscore is 5. GCS motor subscore is 6. Psychiatric:         Mood and Affect: Mood and affect normal.         Behavior: Behavior normal. Behavior is cooperative. Thought Content: Thought content normal.         Lab and Diagnostic Study Results     Labs -   No results found for this or any previous visit (from the past 12 hour(s)).     Radiologic Studies -   @lastxrresult@  CT Results  (Last 48 hours)    None        CXR Results  (Last 48 hours)               07/29/21 1213  XR RIBS BI W PA CHEST 4 VS Final result    Impression:      No acute cardiopulmonary abnormality. No evidence of acute displaced rib fracture. Narrative:  EXAM: XR RIBS BI W PA CHEST 4 VS       CLINICAL INDICATION/HISTORY: pain   -Additional: None       COMPARISON: 10/16/2020       TECHNIQUE: Frontal view of the chest. Multiple views of the bilateral ribs.       _______________       FINDINGS:       HEART AND MEDIASTINUM: Normal cardiac size and mediastinal contours. LUNGS AND PLEURAL SPACES: No focal pneumonic consolidation, pneumothorax, or   pleural effusion. BONY THORAX AND SOFT TISSUES: Mildly limited secondary to osseous   demineralization. No evidence of acute displaced rib fracture. _______________               EKG -performed 11:03 AM.  Read at 11:05 AM.  Rate 64. Normal sinus rhythm. Normal axis. No acute ST-T changes. Normal QTC. Medical Decision Making   - I am the first provider for this patient. - I reviewed the vital signs, available nursing notes, past medical history, past surgical history, family history and social history. - Initial assessment performed. The patients presenting problems have been discussed, and they are in agreement with the care plan formulated and outlined with them. I have encouraged them to ask questions as they arise throughout their visit. Vital Signs-Reviewed the patient's vital signs. Patient Vitals for the past 12 hrs:   Temp Pulse Resp BP SpO2   07/29/21 1109 98.4 °F (36.9 °C) 69 18 (!) 149/77 97 %       Records Reviewed: Nursing Notes    The patient presents with chest pain with a differential diagnosis of chest wall contusion versus fracture. ED Course: There is no evidence of rib fracture on x-rays. Patient be treated with muscle relaxer and NSAIDs. Provider Notes (Medical Decision Making):      MDM       Procedures   Medical Decision Makingedical Decision Making  Performed by: Keron Felix MD  PROCEDURES:  Procedures       Disposition Disposition:    Discharged    DISCHARGE PLAN:  1. Current Discharge Medication List      CONTINUE these medications which have NOT CHANGED    Details   atorvastatin (LIPITOR) 10 mg tablet Take 1 Tablet by mouth daily. Qty: 90 Tablet, Refills: 3    Comments: Please consider 90 day supplies to promote better adherence  Associated Diagnoses: Hypercholesteremia      cetirizine (ZyrTEC) 10 mg tablet Take 10 mg by mouth daily. loperamide-simethicone 2-125 mg tab Take 1 Tab by mouth. acetaminophen (TYLENOL) 650 mg TbER Take 1,300 mg by mouth two (2) times a day. levothyroxine (Synthroid) 88 mcg tablet Take 88 mcg by mouth Daily (before breakfast). budesonide-formoteroL (Symbicort) 160-4.5 mcg/actuation HFAA Take 2 Puffs by inhalation. polyethylene glycol (Miralax) 17 gram/dose powder Take 17 g by mouth two (2) times a day. fluocinoNIDE (LIDEX) 0.05 % ointment Apply  to affected area two (2) times a day. diclofenac (VOLTAREN) 1 % gel Apply  to affected area as needed. L.acidophilus/B. bifidum,longum (PROBIOTIC COLON SUPPORT PO) Take  by mouth daily. omeprazole (PRILOSEC) 20 mg capsule Take 40 mg by mouth daily. aspirin delayed-release 81 mg tablet Take  by mouth daily. cyclobenzaprine (FLEXERIL) 10 mg tablet Take  by mouth as needed for Muscle Spasm(s). gabapentin (Neurontin) 600 mg tablet Take 600 mg by mouth daily. conjugated estrogens (PREMARIN) 0.625 mg/gram vaginal cream Insert 0.5 g into vagina daily. 2.   Follow-up Information     Follow up With Specialties Details Why Contact Info    Soniya Kent MD Family Medicine  As needed [id number]  [phone]          3. Return to ED if worse   4. Current Discharge Medication List      START taking these medications    Details   naproxen (Naprosyn) 500 mg tablet Take 1 Tablet by mouth two (2) times daily (with meals) for 10 days.   Qty: 20 Tablet, Refills: 0  Start date: 7/29/2021, End date: 8/8/2021         CONTINUE these medications which have CHANGED    Details   cyclobenzaprine (FLEXERIL) 10 mg tablet Take 1 Tablet by mouth three (3) times daily as needed for Muscle Spasm(s). Qty: 30 Tablet, Refills: 0  Start date: 7/29/2021               Diagnosis     Clinical Impression:   1. Contusion of chest wall, unspecified laterality, initial encounter        Attestations:    Yara Palomares MD    Please note that this dictation was completed with [employer], the [employer] voice recognition software. Quite often unanticipated grammatical, syntax, homophones, and other interpretive errors are inadvertently transcribed by the computer software. Please disregard these errors. Please excuse any errors that have escaped final proofreading. Thank you.

## 2021-07-30 LAB
ATRIAL RATE: 64 BPM
CALCULATED P AXIS, ECG09: -29 DEGREES
CALCULATED R AXIS, ECG10: 3 DEGREES
CALCULATED T AXIS, ECG11: 28 DEGREES
DIAGNOSIS, 93000: NORMAL
P-R INTERVAL, ECG05: 191 MS
Q-T INTERVAL, ECG07: 399 MS
QRS DURATION, ECG06: 85 MS
QTC CALCULATION (BEZET), ECG08: 412 MS
VENTRICULAR RATE, ECG03: 64 BPM

## 2021-07-30 NOTE — PROGRESS NOTES
Please let patient know labs reviewed, lipids are at goal,  Mildly elevated Bilirubin, to follow up with PCP

## 2021-07-31 ENCOUNTER — APPOINTMENT (OUTPATIENT)
Dept: GENERAL RADIOLOGY | Age: 66
End: 2021-07-31
Attending: EMERGENCY MEDICINE
Payer: MEDICARE

## 2021-07-31 ENCOUNTER — HOSPITAL ENCOUNTER (EMERGENCY)
Age: 66
Discharge: HOME | End: 2021-07-31
Attending: EMERGENCY MEDICINE
Payer: MEDICARE

## 2021-07-31 VITALS
OXYGEN SATURATION: 96 % | RESPIRATION RATE: 20 BRPM | SYSTOLIC BLOOD PRESSURE: 127 MMHG | BODY MASS INDEX: 25.61 KG/M2 | HEART RATE: 73 BPM | HEIGHT: 64 IN | DIASTOLIC BLOOD PRESSURE: 64 MMHG | WEIGHT: 150 LBS | TEMPERATURE: 97.9 F

## 2021-07-31 DIAGNOSIS — S83.502A SPRAIN OF CRUCIATE LIGAMENT OF LEFT KNEE, INITIAL ENCOUNTER: ICD-10-CM

## 2021-07-31 DIAGNOSIS — S20.219D CONTUSION OF CHEST WALL, UNSPECIFIED LATERALITY, SUBSEQUENT ENCOUNTER: ICD-10-CM

## 2021-07-31 DIAGNOSIS — S22.21XA CLOSED FRACTURE OF MANUBRIUM, INITIAL ENCOUNTER: Primary | ICD-10-CM

## 2021-07-31 PROCEDURE — 99283 EMERGENCY DEPT VISIT LOW MDM: CPT

## 2021-07-31 PROCEDURE — 73562 X-RAY EXAM OF KNEE 3: CPT

## 2021-07-31 PROCEDURE — 74011250637 HC RX REV CODE- 250/637: Performed by: EMERGENCY MEDICINE

## 2021-07-31 PROCEDURE — 71120 X-RAY EXAM BREASTBONE 2/>VWS: CPT

## 2021-07-31 RX ORDER — IBUPROFEN 400 MG/1
400 TABLET ORAL
Qty: 20 TABLET | Refills: 0 | Status: SHIPPED | OUTPATIENT
Start: 2021-07-31

## 2021-07-31 RX ORDER — IBUPROFEN 600 MG/1
600 TABLET ORAL
Status: COMPLETED | OUTPATIENT
Start: 2021-07-31 | End: 2021-07-31

## 2021-07-31 RX ORDER — LIDOCAINE 4 G/100G
PATCH TOPICAL
Qty: 1 PACKAGE | Refills: 1 | Status: SHIPPED | OUTPATIENT
Start: 2021-07-31

## 2021-07-31 RX ORDER — HYDROCODONE BITARTRATE AND ACETAMINOPHEN 5; 325 MG/1; MG/1
1 TABLET ORAL
Status: DISCONTINUED | OUTPATIENT
Start: 2021-07-31 | End: 2021-07-31

## 2021-07-31 RX ORDER — HYDROCODONE BITARTRATE AND ACETAMINOPHEN 5; 325 MG/1; MG/1
1 TABLET ORAL
Qty: 9 TABLET | Refills: 0 | Status: SHIPPED | OUTPATIENT
Start: 2021-07-31 | End: 2021-08-03

## 2021-07-31 RX ADMIN — IBUPROFEN 600 MG: 600 TABLET ORAL at 18:00

## 2021-07-31 NOTE — ED TRIAGE NOTES
Pt reports continued pain of the sternum from injury two days ago, reports naproxen not helping. Pt also reports twisting her left knee today when she accidentally stepped in a hole.

## 2021-07-31 NOTE — LETTER
[facility] EMERGENCY DEPT  [address] [address] [phone]    Work/School Note    Date: 7/31/2021    To Whom It May concern:    Patricia Huerta was seen and treated today in the emergency room by the following provider(s):  Attending Provider: eB Marcial MD.      Wendie Moreno is excused from work/school on 7/31/2021 through 8/3/2021. She is medically clear to return to work/school on 8/4/2021.         Sincerely,          Lavonda Hammans, MD

## 2021-07-31 NOTE — ED PROVIDER NOTES
EMERGENCY DEPARTMENT HISTORY AND PHYSICAL EXAM      Date: 7/31/2021  Patient Name: Mario Huerta    History of Presenting Illness     Chief Complaint   Patient presents with    Sternum Pain       History Provided By: Patient    HPI: Wendie Moreno, 76 y.o. female with a past medical history significant hyperlipidemia presents to the ED with cc of Chest wall and left knee pain. States she accidentally hit herself in the chest with a heavy log. She was carrying it in her arms when it slipped backwards on to her chest. She also twisted her left knee. Occurred 2 days ago. She was seenin the ED and had negative Xrays yesterday but Naproxen is not working. Wants Xrays of  Her left knee done. There are no other complaints, changes, or physical findings at this time. PCP: Kay Jain MD    No current facility-administered medications on file prior to encounter. Current Outpatient Medications on File Prior to Encounter   Medication Sig Dispense Refill    cyclobenzaprine (FLEXERIL) 10 mg tablet Take 1 Tablet by mouth three (3) times daily as needed for Muscle Spasm(s). 30 Tablet 0    naproxen (Naprosyn) 500 mg tablet Take 1 Tablet by mouth two (2) times daily (with meals) for 10 days. 20 Tablet 0    atorvastatin (LIPITOR) 10 mg tablet Take 1 Tablet by mouth daily. 90 Tablet 3    cetirizine (ZyrTEC) 10 mg tablet Take 10 mg by mouth daily.  loperamide-simethicone 2-125 mg tab Take 1 Tab by mouth.  acetaminophen (TYLENOL) 650 mg TbER Take 1,300 mg by mouth two (2) times a day.  levothyroxine (Synthroid) 88 mcg tablet Take 88 mcg by mouth Daily (before breakfast).  budesonide-formoteroL (Symbicort) 160-4.5 mcg/actuation HFAA Take 2 Puffs by inhalation.  polyethylene glycol (Miralax) 17 gram/dose powder Take 17 g by mouth two (2) times a day.  fluocinoNIDE (LIDEX) 0.05 % ointment Apply  to affected area two (2) times a day.  L.acidophilus/B. bifidum,longum (PROBIOTIC COLON SUPPORT PO) Take  by mouth daily.  [DISCONTINUED] diclofenac (VOLTAREN) 1 % gel Apply  to affected area as needed.  omeprazole (PRILOSEC) 20 mg capsule Take 40 mg by mouth daily.  aspirin delayed-release 81 mg tablet Take  by mouth daily.  gabapentin (Neurontin) 600 mg tablet Take 600 mg by mouth daily.  conjugated estrogens (PREMARIN) 0.625 mg/gram vaginal cream Insert 0.5 g into vagina daily. Past History     Past Medical History:  Past Medical History:   Diagnosis Date    Arthritis     Endometriosis     Hematuria     Hypercholesteremia 10/17/2019    Kidney stones     Osteoporosis     Pulmonary nodules     Thyroid disease     UTI (urinary tract infection)        Past Surgical History:  Past Surgical History:   Procedure Laterality Date    HX APPENDECTOMY      HX BLADDER SUSPENSION      HX BREAST LUMPECTOMY      HX CHOLECYSTECTOMY      HX HYSTERECTOMY         Family History:  Family History   Problem Relation Age of Onset    Other Mother         irregular heart beat    Stroke Father 61    Stroke Sister 72    Heart Surgery Sister 76    Coronary Artery Disease Sister        Social History:  Social History     Tobacco Use    Smoking status: Never Smoker    Smokeless tobacco: Never Used   Vaping Use    Vaping Use: Never used   Substance Use Topics    Alcohol use: No    Drug use: Never       Allergies: Allergies   Allergen Reactions    Darvocet A500 [Propoxyphene N-Acetaminophen] Nausea and Vomiting    Amitriptyline Other (comments)    Demerol [Meperidine] Other (comments)         Review of Systems     Review of Systems   Constitutional: Negative. HENT: Negative. Respiratory: Negative. Cardiovascular: Negative. Genitourinary: Negative. Musculoskeletal:        Left knee pain and sternal pain   Neurological: Negative. All other systems reviewed and are negative.       Physical Exam     Physical Exam    Lab and Diagnostic Study Results     Labs -   No results found for this or any previous visit (from the past 12 hour(s)). Radiologic Studies -   @lastxrresult@  CT Results  (Last 48 hours)    None        CXR Results  (Last 48 hours)    None            Medical Decision Making   - I am the first provider for this patient. - I reviewed the vital signs, available nursing notes, past medical history, past surgical history, family history and social history. - Initial assessment performed. The patients presenting problems have been discussed, and they are in agreement with the care plan formulated and outlined with them. I have encouraged them to ask questions as they arise throughout their visit. Vital Signs-Reviewed the patient's vital signs. Patient Vitals for the past 12 hrs:   Temp Pulse Resp BP SpO2   07/31/21 1726 97.9 °F (36.6 °C) 73 17 (!) 144/84 97 %       Records Reviewed: Nursing Notes    The patient presents with       ED Course:          Provider Notes (Medical Decision Making):     MDM  Number of Diagnoses or Management Options     Amount and/or Complexity of Data Reviewed  Tests in the radiology section of CPT®: reviewed and ordered    Risk of Complications, Morbidity, and/or Mortality  Presenting problems: low  Diagnostic procedures: low  Management options: low  General comments: Sternum Xray looks like a non displaced sternal fracture with osteoporosis to me. No fracture in knee. Procedures   Medical Decision Makingedical Decision Making  Performed by: Jose Luis Carbone MD  PROCEDURES:  Procedures       Disposition   Disposition: DC- Adult Discharges: All of the diagnostic tests were reviewed and questions answered. Diagnosis, care plan and treatment options were discussed. The patient understands the instructions and will follow up as directed. The patients results have been reviewed with them. They have been counseled regarding their diagnosis.   The patient verbally convey understanding and agreement of the signs, symptoms, diagnosis, treatment and prognosis and additionally agrees to follow up as recommended with their PCP in 24 - 48 hours. They also agree with the care-plan and convey that all of their questions have been answered. I have also put together some discharge instructions for them that include: 1) educational information regarding their diagnosis, 2) how to care for their diagnosis at home, as well a 3) list of reasons why they would want to return to the ED prior to their follow-up appointment, should their condition change. DISCHARGE PLAN:  1. Current Discharge Medication List      CONTINUE these medications which have NOT CHANGED    Details   cyclobenzaprine (FLEXERIL) 10 mg tablet Take 1 Tablet by mouth three (3) times daily as needed for Muscle Spasm(s). Qty: 30 Tablet, Refills: 0      naproxen (Naprosyn) 500 mg tablet Take 1 Tablet by mouth two (2) times daily (with meals) for 10 days. Qty: 20 Tablet, Refills: 0      atorvastatin (LIPITOR) 10 mg tablet Take 1 Tablet by mouth daily. Qty: 90 Tablet, Refills: 3    Comments: Please consider 90 day supplies to promote better adherence  Associated Diagnoses: Hypercholesteremia      cetirizine (ZyrTEC) 10 mg tablet Take 10 mg by mouth daily. loperamide-simethicone 2-125 mg tab Take 1 Tab by mouth. acetaminophen (TYLENOL) 650 mg TbER Take 1,300 mg by mouth two (2) times a day. levothyroxine (Synthroid) 88 mcg tablet Take 88 mcg by mouth Daily (before breakfast). budesonide-formoteroL (Symbicort) 160-4.5 mcg/actuation HFAA Take 2 Puffs by inhalation. polyethylene glycol (Miralax) 17 gram/dose powder Take 17 g by mouth two (2) times a day. fluocinoNIDE (LIDEX) 0.05 % ointment Apply  to affected area two (2) times a day. diclofenac (VOLTAREN) 1 % gel Apply  to affected area as needed. L.acidophilus/B. bifidum,longum (PROBIOTIC COLON SUPPORT PO) Take  by mouth daily.       omeprazole (PRILOSEC) 20 mg capsule Take 40 mg by mouth daily. aspirin delayed-release 81 mg tablet Take  by mouth daily. gabapentin (Neurontin) 600 mg tablet Take 600 mg by mouth daily. conjugated estrogens (PREMARIN) 0.625 mg/gram vaginal cream Insert 0.5 g into vagina daily. 2.   Follow-up Information     Follow up With Specialties Details Why Contact Info    Nacho Lowry MD Family Medicine In 2 days  [address]  [phone]          3. Return to ED if worse   4. Current Discharge Medication List      START taking these medications    Details   lidocaine 4 % patch 1 patch q 12 hours for pain  Indications: Pain  Qty: 1 Package, Refills: 1  Start date: 7/31/2021      ibuprofen (MOTRIN) 400 mg tablet Take 1 Tablet by mouth every six (6) hours as needed for Pain. Qty: 20 Tablet, Refills: 0  Start date: 7/31/2021      HYDROcodone-acetaminophen (Lorcet, HYDROcodone,) 5-325 mg per tablet Take 1 Tablet by mouth every eight (8) hours as needed for Pain for up to 3 days. Max Daily Amount: 3 Tablets. Qty: 9 Tablet, Refills: 0  Start date: 7/31/2021, End date: 8/3/2021    Associated Diagnoses: Closed fracture of manubrium, initial encounter               Diagnosis     Clinical Impression:   1. Closed fracture of manubrium, initial encounter    2. Contusion of chest wall, unspecified laterality, subsequent encounter    3. Sprain of cruciate ligament of left knee, initial encounter        Attestations:    Roni Bazzi MD    Please note that this dictation was completed with [email or web address], the [employer] voice recognition software. Quite often unanticipated grammatical, syntax, homophones, and other interpretive errors are inadvertently transcribed by the computer software. Please disregard these errors. Please excuse any errors that have escaped final proofreading. Thank you.

## 2021-08-03 ENCOUNTER — OFFICE VISIT (OUTPATIENT)
Dept: ORTHOPEDIC SURGERY | Age: 66
End: 2021-08-03
Payer: MEDICARE

## 2021-08-03 DIAGNOSIS — M17.12 OSTEOARTHRITIS OF LEFT KNEE, UNSPECIFIED OSTEOARTHRITIS TYPE: Primary | ICD-10-CM

## 2021-08-03 PROCEDURE — G8536 NO DOC ELDER MAL SCRN: HCPCS | Performed by: ORTHOPAEDIC SURGERY

## 2021-08-03 PROCEDURE — G8432 DEP SCR NOT DOC, RNG: HCPCS | Performed by: ORTHOPAEDIC SURGERY

## 2021-08-03 PROCEDURE — 99213 OFFICE O/P EST LOW 20 MIN: CPT | Performed by: ORTHOPAEDIC SURGERY

## 2021-08-03 PROCEDURE — 1101F PT FALLS ASSESS-DOCD LE1/YR: CPT | Performed by: ORTHOPAEDIC SURGERY

## 2021-08-03 PROCEDURE — 3017F COLORECTAL CA SCREEN DOC REV: CPT | Performed by: ORTHOPAEDIC SURGERY

## 2021-08-03 PROCEDURE — 1090F PRES/ABSN URINE INCON ASSESS: CPT | Performed by: ORTHOPAEDIC SURGERY

## 2021-08-03 PROCEDURE — G8427 DOCREV CUR MEDS BY ELIG CLIN: HCPCS | Performed by: ORTHOPAEDIC SURGERY

## 2021-08-03 PROCEDURE — G8419 CALC BMI OUT NRM PARAM NOF/U: HCPCS | Performed by: ORTHOPAEDIC SURGERY

## 2021-08-03 NOTE — PATIENT INSTRUCTIONS
Knee Pain or Injury: Care Instructions  Your Care Instructions     Injuries are a common cause of knee problems. Sudden (acute) injuries may be caused by a direct blow to the knee. They can also be caused by abnormal twisting, bending, or falling on the knee. Pain, bruising, or swelling may be severe, and may start within minutes of the injury. Overuse is another cause of knee pain. Other causes are climbing stairs, kneeling, and other activities that use the knee. Everyday wear and tear, especially as you get older, also can cause knee pain. Rest, along with home treatment, often relieves pain and allows your knee to heal. If you have a serious knee injury, you may need tests and treatment. Follow-up care is a key part of your treatment and safety. Be sure to make and go to all appointments, and call your doctor if you are having problems. It's also a good idea to know your test results and keep a list of the medicines you take. How can you care for yourself at home? · Be safe with medicines. Read and follow all instructions on the label. ? If the doctor gave you a prescription medicine for pain, take it as prescribed. ? If you are not taking a prescription pain medicine, ask your doctor if you can take an over-the-counter medicine. · Rest and protect your knee. Take a break from any activity that may cause pain. · Put ice or a cold pack on your knee for 10 to 20 minutes at a time. Put a thin cloth between the ice and your skin. · Prop up a sore knee on a pillow when you ice it or anytime you sit or lie down for the next 3 days. Try to keep it above the level of your heart. This will help reduce swelling. · If your knee is not swollen, you can put moist heat, a heating pad, or a warm cloth on your knee. · If your doctor recommends an elastic bandage, sleeve, or other type of support for your knee, wear it as directed.   · Follow your doctor's instructions about how much weight you can put on your leg. Use a cane, crutches, or a walker as instructed. · Follow your doctor's instructions about activity during your healing process. If you can do mild exercise, slowly increase your activity. · Reach and stay at a healthy weight. Extra weight can strain the joints, especially the knees and hips, and make the pain worse. Losing even a few pounds may help. When should you call for help? Call 911 anytime you think you may need emergency care. For example, call if:    · You have symptoms of a blood clot in your lung (called a pulmonary embolism). These may include:  ? Sudden chest pain. ? Trouble breathing. ? Coughing up blood. Call your doctor now or seek immediate medical care if:    · You have severe or increasing pain.     · Your leg or foot turns cold or changes color.     · You cannot stand or put weight on your knee.     · Your knee looks twisted or bent out of shape.     · You cannot move your knee.     · You have signs of infection, such as:  ? Increased pain, swelling, warmth, or redness. ? Red streaks leading from the knee. ? Pus draining from a place on your knee. ? A fever.     · You have signs of a blood clot in your leg (called a deep vein thrombosis), such as:  ? Pain in your calf, back of the knee, thigh, or groin. ? Redness and swelling in your leg or groin. Watch closely for changes in your health, and be sure to contact your doctor if:    · You have tingling, weakness, or numbness in your knee.     · You have any new symptoms, such as swelling.     · You have bruises from a knee injury that last longer than 2 weeks.     · You do not get better as expected. Where can you learn more? Go to http://www.gray.com/  Enter K195 in the search box to learn more about \"Knee Pain or Injury: Care Instructions. \"  Current as of: February 26, 2020               Content Version: 12.8  © 6989-9062 [employer].    Care instructions adapted under license by [employer] (which disclaims liability or warranty for this information). If you have questions about a medical condition or this instruction, always ask your healthcare professional. [address] any warranty or liability for your use of this information.

## 2021-08-03 NOTE — PROGRESS NOTES
Name: Noemi Huerta    : 1955     Service Dept: [address] [place] [address] and Sports Medicine    Patient's Pharmacies:    [address] [place] [address], [address] [place], [place] [address] [id number]  Phone: [phone] Fax: [phone]       Chief Complaint   Patient presents with    Knee Pain        There were no vitals taken for this visit. Allergies   Allergen Reactions    Darvocet A500 [Propoxyphene N-Acetaminophen] Nausea and Vomiting    Amitriptyline Other (comments)    Demerol [Meperidine] Other (comments)      Current Outpatient Medications   Medication Sig Dispense Refill    lidocaine 4 % patch 1 patch q 12 hours for pain  Indications: Pain 1 Package 1    ibuprofen (MOTRIN) 400 mg tablet Take 1 Tablet by mouth every six (6) hours as needed for Pain. 20 Tablet 0    HYDROcodone-acetaminophen (Lorcet, HYDROcodone,) 5-325 mg per tablet Take 1 Tablet by mouth every eight (8) hours as needed for Pain for up to 3 days. Max Daily Amount: 3 Tablets. 9 Tablet 0    cyclobenzaprine (FLEXERIL) 10 mg tablet Take 1 Tablet by mouth three (3) times daily as needed for Muscle Spasm(s). 30 Tablet 0    naproxen (Naprosyn) 500 mg tablet Take 1 Tablet by mouth two (2) times daily (with meals) for 10 days. 20 Tablet 0    atorvastatin (LIPITOR) 10 mg tablet Take 1 Tablet by mouth daily. 90 Tablet 3    cetirizine (ZyrTEC) 10 mg tablet Take 10 mg by mouth daily.  loperamide-simethicone 2-125 mg tab Take 1 Tab by mouth.  acetaminophen (TYLENOL) 650 mg TbER Take 1,300 mg by mouth two (2) times a day.  levothyroxine (Synthroid) 88 mcg tablet Take 88 mcg by mouth Daily (before breakfast).  budesonide-formoteroL (Symbicort) 160-4.5 mcg/actuation HFAA Take 2 Puffs by inhalation.  polyethylene glycol (Miralax) 17 gram/dose powder Take 17 g by mouth two (2) times a day.  fluocinoNIDE (LIDEX) 0.05 % ointment Apply  to affected area two (2) times a day.       L.acidophilus/B. bifidum,longum (PROBIOTIC COLON SUPPORT PO) Take  by mouth daily.  omeprazole (PRILOSEC) 20 mg capsule Take 40 mg by mouth daily.  aspirin delayed-release 81 mg tablet Take  by mouth daily.  gabapentin (Neurontin) 600 mg tablet Take 600 mg by mouth daily.  conjugated estrogens (PREMARIN) 0.625 mg/gram vaginal cream Insert 0.5 g into vagina daily. Patient Active Problem List   Diagnosis Code    Hematuria R31.9    Kidney stones N20.0    Osteoporosis M81.0    UTI (urinary tract infection) N39.0    Atypical angina ([address] I20.8    Non-rheumatic mitral regurgitation I34.0    Chest pain R07.9    Hypercholesteremia E78.00    Abnormal EKG R94.31    Aortic atherosclerosis (HCC) I70.0      Family History   Problem Relation Age of Onset    Other Mother         irregular heart beat    Stroke Father 61    Stroke Sister 72    Heart Surgery Sister 76    Coronary Artery Disease Sister       Social History     Socioeconomic History    Marital status:      Spouse name: Not on file    Number of children: Not on file    Years of education: Not on file    Highest education level: Not on file   Tobacco Use    Smoking status: Never Smoker    Smokeless tobacco: Never Used   Vaping Use    Vaping Use: Never used   Substance and Sexual Activity    Alcohol use: No    Drug use: Never     Social Determinants of Health     Financial Resource Strain:     Difficulty of Paying Living Expenses:    Food Insecurity:     Worried About Running Out of Food in the Last Year:     Ran Out of Food in the Last Year:    Transportation Needs:     Lack of Transportation (Medical):      Lack of Transportation (Non-Medical):    Physical Activity:     Days of Exercise per Week:     Minutes of Exercise per Session:    Stress:     Feeling of Stress :    Social Connections:     Frequency of Communication with Friends and Family:     Frequency of Social Gatherings with Friends and Family:  Attends [employer] Services:     Active Member of Clubs or Organizations:     Attends Club or Organization Meetings:     Marital Status:       Past Surgical History:   Procedure Laterality Date    HX APPENDECTOMY      HX BLADDER SUSPENSION      HX BREAST LUMPECTOMY      HX CHOLECYSTECTOMY      HX HYSTERECTOMY        Past Medical History:   Diagnosis Date    Arthritis     Endometriosis     Hematuria     Hypercholesteremia 10/17/2019    Kidney stones     Osteoporosis     Pulmonary nodules     Thyroid disease     UTI (urinary tract infection)         I have reviewed and agree with PFSH and ROS and intake form in chart and the record furthermore I have reviewed prior medical record(s) regarding this patients care during this appointment. Review of Systems:   Patient is a pleasant appearing individual, appropriately dressed, well hydrated, well nourished, who is alert, appropriately oriented for age, and in no acute distress with a normal gait and normal affect who does not appear to be in any significant pain. Physical Exam:  Left Knee -Decrease range of motion with flexion, Knee arc of greater than 50 degrees, Some crepitation, Grossly neurovascularly intact, Good cap refill, No skin lesion, Moderate swelling, No gross instability, Some quadriceps weakness, greater than 50 degree arc    Right Knee - Full Range of Motion, No crepitation, Grossly neurovascularly intact, Good cap refill, No skin lesion, No swelling, No gross instability, No quadriceps weakness   Encounter Diagnoses     ICD-10-CM ICD-9-CM   1. Osteoarthritis of left knee, unspecified osteoarthritis type  M17.12 715.96       HPI:  The patient is here with a chief complaint of left knee pain, sharp throbbing pain. It has been the same. Pain is 7/10. X-rays of left knee and tib-fib are unremarkable. Assessment/Plan:  1. Left knee arthritic flare. We gave her a shot that has helped and no restrictions from my standpoint. I did offer her physical therapy. She wants to wait. We will see her back as needed. If she gets worse, she is to give me a call. As part of continued conservative pain management options the patient was advised to utilize Tylenol or OTC NSAIDS as long as it is not medically contraindicated. Return to Office: Follow-up and Dispositions    · Return if symptoms worsen or fail to improve. Scribed by Haroon Cotton LPN as dictated by [facility] BETHEL Sandhu MD.  Documentation True and Accepted [facility] BETHEL Sandhu MD

## 2021-08-06 ENCOUNTER — TRANSCRIBE ORDER (OUTPATIENT)
Dept: SCHEDULING | Age: 66
End: 2021-08-06

## 2021-08-06 ENCOUNTER — TELEPHONE (OUTPATIENT)
Dept: CARDIOLOGY CLINIC | Age: 66
End: 2021-08-06

## 2021-08-06 DIAGNOSIS — R07.89 CHEST PAIN, MID STERNAL: Primary | ICD-10-CM

## 2021-08-06 NOTE — TELEPHONE ENCOUNTER
Message was left for patient on July 30 regarding lab results please note the following    Please let patient know labs reviewed, lipids are at goal,  Mildly elevated Bilirubin, to follow up with PCP

## 2021-08-09 ENCOUNTER — TELEPHONE (OUTPATIENT)
Dept: CARDIOLOGY CLINIC | Age: 66
End: 2021-08-09

## 2021-08-09 NOTE — TELEPHONE ENCOUNTER
Patient would like to discuss labs, stated that her pcp told her it wasn't their job to discuss the labs. Please advise.

## 2021-08-10 NOTE — TELEPHONE ENCOUNTER
Spoke with patient and given instructions regarding labs per Mira French NP. She voices understanding and acceptance of this advice and will call back if any further questions or concerns.

## 2021-08-17 ENCOUNTER — HOSPITAL ENCOUNTER (OUTPATIENT)
Dept: CT IMAGING | Age: 66
Discharge: HOME | End: 2021-08-17
Payer: MEDICARE

## 2021-08-17 DIAGNOSIS — R07.89 CHEST PAIN, MID STERNAL: ICD-10-CM

## 2021-08-17 PROCEDURE — 71250 CT THORAX DX C-: CPT

## 2021-09-23 ENCOUNTER — TRANSCRIBE ORDER (OUTPATIENT)
Dept: SCHEDULING | Age: 66
End: 2021-09-23

## 2021-09-23 DIAGNOSIS — Z12.31 VISIT FOR SCREENING MAMMOGRAM: Primary | ICD-10-CM

## 2021-09-30 ENCOUNTER — HOSPITAL ENCOUNTER (OUTPATIENT)
Dept: MAMMOGRAPHY | Age: 66
Discharge: HOME | End: 2021-09-30
Payer: MEDICARE

## 2021-09-30 DIAGNOSIS — Z12.31 VISIT FOR SCREENING MAMMOGRAM: ICD-10-CM

## 2021-09-30 PROCEDURE — 77067 SCR MAMMO BI INCL CAD: CPT

## 2021-12-25 ENCOUNTER — HOSPITAL ENCOUNTER (EMERGENCY)
Age: 66
Discharge: HOME | End: 2021-12-25
Attending: INTERNAL MEDICINE
Payer: MEDICARE

## 2021-12-25 ENCOUNTER — APPOINTMENT (OUTPATIENT)
Dept: CT IMAGING | Age: 66
End: 2021-12-25
Attending: INTERNAL MEDICINE
Payer: MEDICARE

## 2021-12-25 VITALS
OXYGEN SATURATION: 100 % | TEMPERATURE: 98.1 F | HEART RATE: 69 BPM | RESPIRATION RATE: 16 BRPM | WEIGHT: 150 LBS | BODY MASS INDEX: 25.61 KG/M2 | DIASTOLIC BLOOD PRESSURE: 64 MMHG | HEIGHT: 64 IN | SYSTOLIC BLOOD PRESSURE: 117 MMHG

## 2021-12-25 DIAGNOSIS — J01.11 ACUTE RECURRENT FRONTAL SINUSITIS: Primary | ICD-10-CM

## 2021-12-25 DIAGNOSIS — D32.9 MENINGIOMA (HCC): ICD-10-CM

## 2021-12-25 LAB
ANION GAP SERPL CALC-SCNC: 9 MMOL/L
BASOPHILS # BLD: 0 K/UL (ref 0–0.1)
BASOPHILS NFR BLD: 1 % (ref 0–2)
BUN SERPL-MCNC: 13 MG/DL (ref 9–21)
BUN/CREAT SERPL: 33
CA-I BLD-MCNC: 9.2 MG/DL (ref 8.5–10.5)
CHLORIDE SERPL-SCNC: 106 MMOL/L (ref 94–111)
CO2 SERPL-SCNC: 25 MMOL/L (ref 21–33)
CREAT SERPL-MCNC: 0.4 MG/DL (ref 0.7–1.2)
DIFFERENTIAL METHOD BLD: ABNORMAL
EOSINOPHIL # BLD: 0.3 K/UL (ref 0–0.4)
EOSINOPHIL NFR BLD: 4 % (ref 0–5)
ERYTHROCYTE [DISTWIDTH] IN BLOOD BY AUTOMATED COUNT: 11.7 % (ref 11.6–14.5)
FLUAV AG NPH QL IA: NEGATIVE
FLUBV AG NOSE QL IA: NEGATIVE
GLUCOSE SERPL-MCNC: 89 MG/DL (ref 70–110)
HCT VFR BLD AUTO: 37.4 % (ref 35–45)
HGB BLD-MCNC: 12.6 G/DL (ref 12–16)
IMM GRANULOCYTES # BLD AUTO: 0 K/UL (ref 0–0.04)
IMM GRANULOCYTES NFR BLD AUTO: 0 % (ref 0–0.5)
LYMPHOCYTES # BLD: 2.2 K/UL (ref 0.9–3.6)
LYMPHOCYTES NFR BLD: 36 % (ref 21–52)
MCH RBC QN AUTO: 32 PG (ref 24–34)
MCHC RBC AUTO-ENTMCNC: 33.7 G/DL (ref 31–37)
MCV RBC AUTO: 94.9 FL (ref 78–100)
MONOCYTES # BLD: 0.4 K/UL (ref 0.05–1.2)
MONOCYTES NFR BLD: 7 % (ref 3–10)
NEUTS SEG # BLD: 3.1 K/UL (ref 1.8–8)
NEUTS SEG NFR BLD: 52 % (ref 40–73)
NRBC # BLD: 0 K/UL (ref 0–0.01)
NRBC BLD-RTO: 0 PER 100 WBC
PLATELET # BLD AUTO: 243 K/UL (ref 135–420)
PMV BLD AUTO: 9.8 FL (ref 9.2–11.8)
POTASSIUM SERPL-SCNC: 3.6 MMOL/L (ref 3.2–5.1)
RBC # BLD AUTO: 3.94 M/UL (ref 4.2–5.3)
SODIUM SERPL-SCNC: 140 MMOL/L (ref 135–145)
WBC # BLD AUTO: 6.1 K/UL (ref 4.6–13.2)

## 2021-12-25 PROCEDURE — 80048 BASIC METABOLIC PNL TOTAL CA: CPT

## 2021-12-25 PROCEDURE — 85025 COMPLETE CBC W/AUTO DIFF WBC: CPT

## 2021-12-25 PROCEDURE — 99284 EMERGENCY DEPT VISIT MOD MDM: CPT

## 2021-12-25 PROCEDURE — 87804 INFLUENZA ASSAY W/OPTIC: CPT

## 2021-12-25 PROCEDURE — 74011250637 HC RX REV CODE- 250/637: Performed by: INTERNAL MEDICINE

## 2021-12-25 PROCEDURE — 96374 THER/PROPH/DIAG INJ IV PUSH: CPT

## 2021-12-25 PROCEDURE — 74011250636 HC RX REV CODE- 250/636: Performed by: INTERNAL MEDICINE

## 2021-12-25 PROCEDURE — 70450 CT HEAD/BRAIN W/O DYE: CPT

## 2021-12-25 PROCEDURE — 36415 COLL VENOUS BLD VENIPUNCTURE: CPT

## 2021-12-25 RX ORDER — AZITHROMYCIN 250 MG/1
TABLET, FILM COATED ORAL
Qty: 6 TABLET | Refills: 0 | Status: SHIPPED | OUTPATIENT
Start: 2021-12-25 | End: 2021-12-30

## 2021-12-25 RX ORDER — KETOROLAC TROMETHAMINE 30 MG/ML
15 INJECTION, SOLUTION INTRAMUSCULAR; INTRAVENOUS
Status: COMPLETED | OUTPATIENT
Start: 2021-12-25 | End: 2021-12-25

## 2021-12-25 RX ORDER — CETIRIZINE HYDROCHLORIDE, PSEUDOEPHEDRINE HYDROCHLORIDE 5; 120 MG/1; MG/1
1 TABLET, FILM COATED, EXTENDED RELEASE ORAL 2 TIMES DAILY
Qty: 20 TABLET | Refills: 0 | Status: SHIPPED | OUTPATIENT
Start: 2021-12-25 | End: 2022-01-04

## 2021-12-25 RX ORDER — ACETAMINOPHEN 500 MG
1000 TABLET ORAL
Status: COMPLETED | OUTPATIENT
Start: 2021-12-25 | End: 2021-12-25

## 2021-12-25 RX ADMIN — ACETAMINOPHEN 1000 MG: 500 TABLET ORAL at 13:01

## 2021-12-25 RX ADMIN — KETOROLAC TROMETHAMINE 15 MG: 30 INJECTION, SOLUTION INTRAMUSCULAR at 13:01

## 2021-12-25 RX ADMIN — SODIUM CHLORIDE 1000 ML: 9 INJECTION, SOLUTION INTRAVENOUS at 13:01

## 2021-12-25 NOTE — ED PROVIDER NOTES
EMERGENCY DEPARTMENT HISTORY AND PHYSICAL EXAM      Date: 12/25/2021  Patient Name: Pietro Huerta    History of Presenting Illness     Chief Complaint   Patient presents with    Headache    Nasal Congestion       History Provided By: Patient    HPI: Jordyn Herring, 78 y.o. female with a past medical history significant for hyperlipidemia; Hypothyroid; MR; kidney stone; p/o gallbladder/ hysterectomy/ bladder suspension that presents to the ED with cc of URI sx for past 5 days. Pt states that she developed a frontal to holocephalic throbbing headache with post nasal drainage; stuffy nose; dry hacking cough and chest congestion; sneezing. No sore throat and fever but feels cold. Has had covid vaccine x 2 . Was around grandson who had croup. There are no other complaints, changes, or physical findings at this time. PCP: Skpi Tellez MD; Dr Kassidy Pope    No current facility-administered medications on file prior to encounter. Current Outpatient Medications on File Prior to Encounter   Medication Sig Dispense Refill    lidocaine 4 % patch 1 patch q 12 hours for pain  Indications: Pain 1 Package 1    ibuprofen (MOTRIN) 400 mg tablet Take 1 Tablet by mouth every six (6) hours as needed for Pain. 20 Tablet 0    cyclobenzaprine (FLEXERIL) 10 mg tablet Take 1 Tablet by mouth three (3) times daily as needed for Muscle Spasm(s). 30 Tablet 0    atorvastatin (LIPITOR) 10 mg tablet Take 1 Tablet by mouth daily. 90 Tablet 3    loperamide-simethicone 2-125 mg tab Take 1 Tab by mouth.  acetaminophen (TYLENOL) 650 mg TbER Take 1,300 mg by mouth two (2) times a day.  levothyroxine (Synthroid) 88 mcg tablet Take 88 mcg by mouth Daily (before breakfast).  polyethylene glycol (Miralax) 17 gram/dose powder Take 17 g by mouth two (2) times a day.  fluocinoNIDE (LIDEX) 0.05 % ointment Apply  to affected area two (2) times a day.  L.acidophilus/B. bifidum,longum (PROBIOTIC COLON SUPPORT PO) Take  by mouth daily.  omeprazole (PRILOSEC) 20 mg capsule Take 40 mg by mouth daily.  aspirin delayed-release 81 mg tablet Take  by mouth daily.  gabapentin (Neurontin) 600 mg tablet Take 600 mg by mouth daily.  conjugated estrogens (PREMARIN) 0.625 mg/gram vaginal cream Insert 0.5 g into vagina daily.  [DISCONTINUED] cetirizine (ZyrTEC) 10 mg tablet Take 10 mg by mouth daily.  budesonide-formoteroL (Symbicort) 160-4.5 mcg/actuation HFAA Take 2 Puffs by inhalation. (Patient not taking: Reported on 12/25/2021)         Past History     Past Medical History:  Past Medical History:   Diagnosis Date    Arthritis     Endocrine disorder     Endometriosis     Hematuria     Hypercholesteremia 10/17/2019    Kidney stones     Menopause     Osteoporosis     Pulmonary nodules     Thyroid disease     UTI (urinary tract infection)        Past Surgical History:  Past Surgical History:   Procedure Laterality Date    HX APPENDECTOMY      HX BLADDER SUSPENSION      HX BREAST BIOPSY Left     Benign done [facility] w/ Dr Liset Santos    HX BREAST LUMPECTOMY      HX CHOLECYSTECTOMY      HX HYSTERECTOMY      MELVIN at [facility] w/ Dr Tiffany Sloan    HX OOPHORECTOMY         Family History:  Family History   Problem Relation Age of Onset    Other Mother         irregular heart beat    Breast Cancer Mother     Stroke Father 61    Stroke Sister 72    Heart Surgery Sister 76    Coronary Art Dis Sister     Cancer Sister        Social History:  Social History     Tobacco Use    Smoking status: Never Smoker    Smokeless tobacco: Never Used   Vaping Use    Vaping Use: Never used   Substance Use Topics    Alcohol use: No    Drug use: Never       Allergies:   Allergies   Allergen Reactions    Darvocet A500 [Propoxyphene N-Acetaminophen] Nausea and Vomiting    Amitriptyline Other (comments)    Demerol [Meperidine] Other (comments)         Review of Systems     Review of Systems   Constitutional: Negative for chills and fever.   HENT: Positive for congestion and rhinorrhea. Negative for sore throat. Eyes: Negative for redness. Respiratory: Positive for cough and chest tightness. Negative for shortness of breath and wheezing. Cardiovascular: Negative for chest pain and leg swelling. Gastrointestinal: Negative for abdominal pain, diarrhea, nausea and vomiting. Genitourinary: Negative for dysuria and flank pain. Musculoskeletal: Negative for neck stiffness. Skin: Negative for rash. Neurological: Positive for headaches. Negative for numbness. Psychiatric/Behavioral: Negative for confusion. Physical Exam     Physical Exam  Vitals and nursing note reviewed. Constitutional:       General: She is not in acute distress. Appearance: Normal appearance. She is well-developed. She is not ill-appearing or diaphoretic. HENT:      Head: Normocephalic. Mouth/Throat:      Pharynx: No oropharyngeal exudate or posterior oropharyngeal erythema. Comments: Frontal and maxillary sinus tenderness; no post nasal drip  Eyes:      Conjunctiva/sclera: Conjunctivae normal.      Pupils: Pupils are equal, round, and reactive to light. Neck:      Thyroid: No thyromegaly. Vascular: No JVD. Cardiovascular:      Rate and Rhythm: Normal rate and regular rhythm. Heart sounds: No friction rub. Pulmonary:      Effort: Pulmonary effort is normal. No respiratory distress. Breath sounds: Normal breath sounds. Abdominal:      General: Bowel sounds are normal. There is no distension. Palpations: Abdomen is soft. Tenderness: There is no abdominal tenderness. Musculoskeletal:         General: Normal range of motion. Cervical back: Neck supple. No rigidity. Skin:     General: Skin is warm and dry. Findings: No erythema. Neurological:      Mental Status: She is alert and oriented to person, place, and time.          Lab and Diagnostic Study Results     Labs -     Recent Results (from the past 12 hour(s))   CBC WITH AUTOMATED DIFF    Collection Time: 12/25/21 12:52 PM   Result Value Ref Range    WBC 6.1 4.6 - 13.2 K/uL    RBC 3.94 (L) 4.20 - 5.30 M/uL    HGB 12.6 12.0 - 16.0 g/dL    HCT 37.4 35.0 - 45.0 %    MCV 94.9 78.0 - 100.0 FL    MCH 32.0 24.0 - 34.0 PG    MCHC 33.7 31.0 - 37.0 g/dL    RDW 11.7 11.6 - 14.5 %    PLATELET 028 894 - 259 K/uL    MPV 9.8 9.2 - 11.8 FL    NRBC 0.0 0.0  WBC    ABSOLUTE NRBC 0.00 0.00 - 0.01 K/uL    NEUTROPHILS 52 40 - 73 %    LYMPHOCYTES 36 21 - 52 %    MONOCYTES 7 3 - 10 %    EOSINOPHILS 4 0 - 5 %    BASOPHILS 1 0 - 2 %    IMMATURE GRANULOCYTES 0 0 - 0.5 %    ABS. NEUTROPHILS 3.1 1.8 - 8.0 K/UL    ABS. LYMPHOCYTES 2.2 0.9 - 3.6 K/UL    ABS. MONOCYTES 0.4 0.05 - 1.2 K/UL    ABS. EOSINOPHILS 0.3 0.0 - 0.4 K/UL    ABS. BASOPHILS 0.0 0.0 - 0.1 K/UL    ABS. IMM. GRANS. 0.0 0.00 - 0.04 K/UL    DF AUTOMATED     METABOLIC PANEL, BASIC    Collection Time: 12/25/21 12:52 PM   Result Value Ref Range    Sodium 140 135 - 145 mmol/L    Potassium 3.6 3.2 - 5.1 mmol/L    Chloride 106 94 - 111 mmol/L    CO2 25 21 - 33 mmol/L    Anion gap 9 mmol/L    Glucose 89 70 - 110 mg/dL    BUN 13 9 - 21 mg/dL    Creatinine 0.40 (L) 0.70 - 1.20 mg/dL    BUN/Creatinine ratio 33      GFR est AA >60 ml/min/1.73m2    GFR est non-AA >60 ml/min/1.73m2    Calcium 9.2 8.5 - 10.5 mg/dL   INFLUENZA A & B AG (RAPID TEST)    Collection Time: 12/25/21 12:52 PM   Result Value Ref Range    Influenza A Antigen Negative Negative      Influenza B Antigen Negative Negative         Radiologic Studies -   @lastxrresult@  CT Results  (Last 48 hours)               12/25/21 1521  CT HEAD WO CONT Final result    Impression:      No acute intracranial abnormalities. 7.7 mm partially calcified density in the   left side the foramen magnum. This suggestive of a partially calcified   meningioma. Consider nonemergent MRI for further evaluation. Narrative:  EXAM: CT head       INDICATION: Headache. COMPARISON: None. TECHNIQUE: Axial CT imaging of the head was performed without intravenous   contrast. One or more dose reduction techniques were used on this CT: automated   exposure control, adjustment of the mAs and/or kVp according to patient size,   and iterative reconstruction techniques. The specific techniques used on this   CT exam have been documented in the patient's electronic medical record. Digital   Imaging and Communications in Medicine (DICOM) format image data are available   to nonaffiliated external healthcare facilities or entities on a secure, media   free, reciprocally searchable basis with patient authorization for at least a   12-month period after this study. _______________       FINDINGS:       BRAIN AND POSTERIOR FOSSA: The sulci, folia, ventricles and basal cisterns are   within normal limits for the patient's age. There is no intracranial hemorrhage,   mass effect, or midline shift. There are no areas of abnormal parenchymal   attenuation. There is a 7.7 mm partially calcified density in the region the   foramen magnum on the left side, suggestive of a partially calcified meningioma. EXTRA-AXIAL SPACES AND MENINGES: There are no abnormal extra-axial fluid   collections. CALVARIUM: Intact. SINUSES: Clear. OTHER: None.       _______________               CXR Results  (Last 48 hours)    None            Medical Decision Making   - I am the first provider for this patient. - I reviewed the vital signs, available nursing notes, past medical history, past surgical history, family history and social history. - Initial assessment performed. The patients presenting problems have been discussed, and they are in agreement with the care plan formulated and outlined with them. I have encouraged them to ask questions as they arise throughout their visit. Vital Signs-Reviewed the patient's vital signs.   Patient Vitals for the past 12 hrs:   Temp Pulse Resp BP SpO2   12/25/21 1159 98.1 °F (36.7 °C) 78 16 122/80 96 %       Records Reviewed: Nursing Notes      ED Course:          Procedures   Me    Disposition   Disposition:   Discharged    DISCHARGE PLAN:  1. Current Discharge Medication List      CONTINUE these medications which have NOT CHANGED    Details   lidocaine 4 % patch 1 patch q 12 hours for pain  Indications: Pain  Qty: 1 Package, Refills: 1      ibuprofen (MOTRIN) 400 mg tablet Take 1 Tablet by mouth every six (6) hours as needed for Pain. Qty: 20 Tablet, Refills: 0      cyclobenzaprine (FLEXERIL) 10 mg tablet Take 1 Tablet by mouth three (3) times daily as needed for Muscle Spasm(s). Qty: 30 Tablet, Refills: 0      atorvastatin (LIPITOR) 10 mg tablet Take 1 Tablet by mouth daily. Qty: 90 Tablet, Refills: 3    Comments: Please consider 90 day supplies to promote better adherence  Associated Diagnoses: Hypercholesteremia      cetirizine (ZyrTEC) 10 mg tablet Take 10 mg by mouth daily. loperamide-simethicone 2-125 mg tab Take 1 Tab by mouth. acetaminophen (TYLENOL) 650 mg TbER Take 1,300 mg by mouth two (2) times a day. levothyroxine (Synthroid) 88 mcg tablet Take 88 mcg by mouth Daily (before breakfast). polyethylene glycol (Miralax) 17 gram/dose powder Take 17 g by mouth two (2) times a day. fluocinoNIDE (LIDEX) 0.05 % ointment Apply  to affected area two (2) times a day. L.acidophilus/B. bifidum,longum (PROBIOTIC COLON SUPPORT PO) Take  by mouth daily. omeprazole (PRILOSEC) 20 mg capsule Take 40 mg by mouth daily. aspirin delayed-release 81 mg tablet Take  by mouth daily. gabapentin (Neurontin) 600 mg tablet Take 600 mg by mouth daily. conjugated estrogens (PREMARIN) 0.625 mg/gram vaginal cream Insert 0.5 g into vagina daily. budesonide-formoteroL (Symbicort) 160-4.5 mcg/actuation HFAA Take 2 Puffs by inhalation.            2.   Follow-up Information     Follow up With Specialties Details Why Contact Info    Aditi Hankins MD Family Medicine Schedule an appointment as soon as possible for a visit in 1 week  [address]  [phone]          3. Return to ED if worse   4. Current Discharge Medication List      START taking these medications    Details   azithromycin (Zithromax Z-Devin) 250 mg tablet 2 tablets today and then 1 tablet daily for the next 4 days  Qty: 6 Tablet, Refills: 0  Start date: 12/25/2021, End date: 12/30/2021      guaiFENesin-dextromethorphan SR (Mucinex DM) 600-30 mg per tablet Take 1 Tablet by mouth two (2) times a day. Qty: 20 Tablet, Refills: 0  Start date: 12/25/2021      cetirizine-pseudoePHEDrine (ZyrTEC-D) 5-120 mg Tb12 Take 1 Tablet by mouth two (2) times a day for 10 days. Qty: 20 Tablet, Refills: 0  Start date: 12/25/2021, End date: 1/4/2022               Diagnosis     Clinical Impression:   1. Acute recurrent frontal sinusitis    2. Meningioma [facility])        Attestations:    Nikki Bui MD    Please note that this dictation was completed with [employer], the computer voice recognition software. Quite often unanticipated grammatical, syntax, homophones, and other interpretive errors are inadvertently transcribed by the computer software. Please disregard these errors. Please excuse any errors that have escaped final proofreading. Thank you.

## 2021-12-25 NOTE — ED TRIAGE NOTES
Pt states she has had head and chest congestion x 4 days, pt has not had fever.   She has been taking Nyquil which relieves the symptoms for a short while but they come back

## 2022-01-12 ENCOUNTER — TRANSCRIBE ORDER (OUTPATIENT)
Dept: SCHEDULING | Age: 67
End: 2022-01-12

## 2022-01-12 DIAGNOSIS — M81.0 OSTEOPOROSIS: Primary | ICD-10-CM

## 2022-02-08 ENCOUNTER — TELEPHONE (OUTPATIENT)
Dept: CARDIOLOGY CLINIC | Age: 67
End: 2022-02-08

## 2022-02-08 NOTE — TELEPHONE ENCOUNTER
Patient last seen 2/15/2021, she is scheduled back in 2 years, patient asking if she is due for any upcoming labs? States that she normally get them drawn every 6 months, monitoring her liver function? Please advise.

## 2022-02-09 NOTE — TELEPHONE ENCOUNTER
Spoke with pt regarding routine labs due yearly for being on statin medication. Advised to f/u with PCP if needing labs.

## 2022-02-09 NOTE — TELEPHONE ENCOUNTER
Please ask PCP for any routine labs.   Due to the fact that she takes statins, once a year lipid profile and liver functions is usually what I follow

## 2022-02-22 ENCOUNTER — HOSPITAL ENCOUNTER (OUTPATIENT)
Dept: BONE DENSITY | Age: 67
Discharge: HOME | End: 2022-02-22
Attending: HOSPITALIST
Payer: MEDICARE

## 2022-02-22 DIAGNOSIS — M81.0 OSTEOPOROSIS: ICD-10-CM

## 2022-02-22 PROCEDURE — 77080 DXA BONE DENSITY AXIAL: CPT

## 2022-03-19 PROBLEM — R94.31 ABNORMAL EKG: Status: ACTIVE | Noted: 2020-02-20

## 2022-03-19 PROBLEM — I34.0 NON-RHEUMATIC MITRAL REGURGITATION: Status: ACTIVE | Noted: 2019-06-14

## 2022-03-19 PROBLEM — I20.8 ATYPICAL ANGINA (HCC): Status: ACTIVE | Noted: 2019-06-14

## 2022-03-19 PROBLEM — R07.9 CHEST PAIN: Status: ACTIVE | Noted: 2019-06-14

## 2022-03-19 PROBLEM — I20.89 ATYPICAL ANGINA: Status: ACTIVE | Noted: 2019-06-14

## 2022-03-19 PROBLEM — E78.00 HYPERCHOLESTEREMIA: Status: ACTIVE | Noted: 2019-10-17

## 2022-04-05 ENCOUNTER — TRANSCRIBE ORDER (OUTPATIENT)
Dept: REGISTRATION | Age: 67
End: 2022-04-05

## 2022-04-05 ENCOUNTER — HOSPITAL ENCOUNTER (OUTPATIENT)
Dept: GENERAL RADIOLOGY | Age: 67
Discharge: HOME | End: 2022-04-05
Payer: MEDICARE

## 2022-04-05 DIAGNOSIS — M25.569 KNEE PAIN: Primary | ICD-10-CM

## 2022-04-05 DIAGNOSIS — M25.569 KNEE PAIN: ICD-10-CM

## 2022-04-05 PROCEDURE — 73564 X-RAY EXAM KNEE 4 OR MORE: CPT

## 2022-04-22 ENCOUNTER — OFFICE VISIT (OUTPATIENT)
Dept: ORTHOPEDIC SURGERY | Age: 67
End: 2022-04-22
Payer: MEDICARE

## 2022-04-22 VITALS — TEMPERATURE: 98.4 F | WEIGHT: 160 LBS | BODY MASS INDEX: 27.46 KG/M2 | HEART RATE: 67 BPM | OXYGEN SATURATION: 97 %

## 2022-04-22 DIAGNOSIS — G89.29 CHRONIC PAIN OF RIGHT KNEE: Primary | ICD-10-CM

## 2022-04-22 DIAGNOSIS — M70.61 GREATER TROCHANTERIC BURSITIS OF RIGHT HIP: ICD-10-CM

## 2022-04-22 DIAGNOSIS — M17.11 PRIMARY OSTEOARTHRITIS OF RIGHT KNEE: ICD-10-CM

## 2022-04-22 DIAGNOSIS — M25.551 RIGHT HIP PAIN: ICD-10-CM

## 2022-04-22 DIAGNOSIS — M54.50 LUMBAR PAIN: ICD-10-CM

## 2022-04-22 DIAGNOSIS — M54.16 LUMBAR RADICULOPATHY: ICD-10-CM

## 2022-04-22 DIAGNOSIS — M25.561 CHRONIC PAIN OF RIGHT KNEE: Primary | ICD-10-CM

## 2022-04-22 PROCEDURE — G8419 CALC BMI OUT NRM PARAM NOF/U: HCPCS | Performed by: ORTHOPAEDIC SURGERY

## 2022-04-22 PROCEDURE — 73564 X-RAY EXAM KNEE 4 OR MORE: CPT | Performed by: ORTHOPAEDIC SURGERY

## 2022-04-22 PROCEDURE — 20610 DRAIN/INJ JOINT/BURSA W/O US: CPT | Performed by: ORTHOPAEDIC SURGERY

## 2022-04-22 PROCEDURE — 3017F COLORECTAL CA SCREEN DOC REV: CPT | Performed by: ORTHOPAEDIC SURGERY

## 2022-04-22 PROCEDURE — 1101F PT FALLS ASSESS-DOCD LE1/YR: CPT | Performed by: ORTHOPAEDIC SURGERY

## 2022-04-22 PROCEDURE — 72170 X-RAY EXAM OF PELVIS: CPT | Performed by: ORTHOPAEDIC SURGERY

## 2022-04-22 PROCEDURE — 1090F PRES/ABSN URINE INCON ASSESS: CPT | Performed by: ORTHOPAEDIC SURGERY

## 2022-04-22 PROCEDURE — 99215 OFFICE O/P EST HI 40 MIN: CPT | Performed by: ORTHOPAEDIC SURGERY

## 2022-04-22 PROCEDURE — G8427 DOCREV CUR MEDS BY ELIG CLIN: HCPCS | Performed by: ORTHOPAEDIC SURGERY

## 2022-04-22 PROCEDURE — G8536 NO DOC ELDER MAL SCRN: HCPCS | Performed by: ORTHOPAEDIC SURGERY

## 2022-04-22 PROCEDURE — G9899 SCRN MAM PERF RSLTS DOC: HCPCS | Performed by: ORTHOPAEDIC SURGERY

## 2022-04-22 PROCEDURE — G8432 DEP SCR NOT DOC, RNG: HCPCS | Performed by: ORTHOPAEDIC SURGERY

## 2022-04-22 RX ORDER — BETAMETHASONE SODIUM PHOSPHATE AND BETAMETHASONE ACETATE 3; 3 MG/ML; MG/ML
6 INJECTION, SUSPENSION INTRA-ARTICULAR; INTRALESIONAL; INTRAMUSCULAR; SOFT TISSUE ONCE
Status: COMPLETED | OUTPATIENT
Start: 2022-04-22 | End: 2022-04-22

## 2022-04-22 RX ADMIN — BETAMETHASONE SODIUM PHOSPHATE AND BETAMETHASONE ACETATE 6 MG: 3; 3 INJECTION, SUSPENSION INTRA-ARTICULAR; INTRALESIONAL; INTRAMUSCULAR; SOFT TISSUE at 11:05

## 2022-04-22 NOTE — PROGRESS NOTES
Patient: Al Huerta                MRN: [id number]       SSN: [id number]  YOB: 1955        AGE: 78 y.o. SEX: female  Body mass index is 27.46 kg/m².     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 usually nonradicular she apparently had a broken knee at 1 point in time and I suspect she had a tibial plateau fracture but she was not sure and wear a brace for a couple of months and the examination today she stands in varus with a significant lateral thrust worse than the x-rays which show and the hips rotate adequately she is tender around L4-5 on the right side straight leg raise just equivocal mild decrease in sensation L4 to been EHL 5 out of 5 the knee itself is in varus couple degree fixed flexion deformity bends fairly well no evidence for infection or DVT    X-rays today on 4/22/2022 we did AP pelvis and 4 views of the right knee confirm advanced arthritis right knee just mild arthritis of the hip she has some insertional calcific tendinitis involving the abductor tendon as well in the lower lumbar spine is incompletely visualized confirming advanced arthritis    There was a discussion regarding surgery was decided not currently recommended we should pursue nonoperative measures I will refer her to the spine center for mechanical back pain bursal injection today under aseptic conditions and after informed consent with timeout and also right knee injection intra-articular we will have her return to see us after she is seeing the spine center in about 6 weeks or so  the efficacy of these injections are for her eventually heading towards a knee replacement but hopefully a few years away    REVIEW OF SYSTEMS:      CON: negative  EYE: negative   ENT: negative  RESP: negative  GI:    negative   : negative  MSK: Positive  A twelve point review of systems was completed, positives noted and all other systems were reviewed and are negative          Past Medical History:   Diagnosis Date    Arthritis     Endocrine disorder     Endometriosis     Hematuria     Hypercholesteremia 10/17/2019    Kidney stones     Menopause     Osteoporosis     Pulmonary nodules     Thyroid disease     UTI (urinary tract infection)        Family History   Problem Relation Age of Onset    Other Mother         irregular heart beat    Breast Cancer Mother     Stroke Father 61    Stroke Sister 72    Heart Surgery Sister 76    Coronary Art Dis Sister     Cancer Sister        Current Outpatient Medications   Medication Sig Dispense Refill    lidocaine 4 % patch 1 patch q 12 hours for pain  Indications: Pain 1 Package 1    ibuprofen (MOTRIN) 400 mg tablet Take 1 Tablet by mouth every six (6) hours as needed for Pain. 20 Tablet 0    cyclobenzaprine (FLEXERIL) 10 mg tablet Take 1 Tablet by mouth three (3) times daily as needed for Muscle Spasm(s). 30 Tablet 0    atorvastatin (LIPITOR) 10 mg tablet Take 1 Tablet by mouth daily. 90 Tablet 3    loperamide-simethicone 2-125 mg tab Take 1 Tab by mouth.  acetaminophen (TYLENOL) 650 mg TbER Take 1,300 mg by mouth two (2) times a day.  levothyroxine (Synthroid) 88 mcg tablet Take 88 mcg by mouth Daily (before breakfast).  budesonide-formoteroL (Symbicort) 160-4.5 mcg/actuation HFAA Take 2 Puffs by inhalation.  polyethylene glycol (Miralax) 17 gram/dose powder Take 17 g by mouth two (2) times a day.  fluocinoNIDE (LIDEX) 0.05 % ointment Apply  to affected area two (2) times a day.  L.acidophilus/B. bifidum,longum (PROBIOTIC COLON SUPPORT PO) Take  by mouth daily.  omeprazole (PRILOSEC) 20 mg capsule Take 40 mg by mouth daily.  aspirin delayed-release 81 mg tablet Take  by mouth daily.       gabapentin (Neurontin) 600 mg tablet Take 600 mg by mouth daily.  conjugated estrogens (PREMARIN) 0.625 mg/gram vaginal cream Insert 0.5 g into vagina daily.  guaiFENesin-dextromethorphan SR (Mucinex DM) 600-30 mg per tablet Take 1 Tablet by mouth two (2) times a day. (Patient not taking: Reported on 4/22/2022) 20 Tablet 0       Allergies   Allergen Reactions    Darvocet A500 [Propoxyphene N-Acetaminophen] Nausea and Vomiting    Amitriptyline Other (comments)    Demerol [Meperidine] Other (comments)       Past Surgical History:   Procedure Laterality Date    HX APPENDECTOMY      HX BLADDER SUSPENSION      HX BREAST BIOPSY Left     Benign done [facility] w/ Dr Magy Montanez    HX BREAST LUMPECTOMY      HX CHOLECYSTECTOMY      HX HYSTERECTOMY      MELVIN at [facility] w/ Dr Booth Hair History     Socioeconomic History    Marital status:      Spouse name: Not on file    Number of children: Not on file    Years of education: Not on file    Highest education level: Not on file   Occupational History    Not on file   Tobacco Use    Smoking status: Never Smoker    Smokeless tobacco: Never Used   Vaping Use    Vaping Use: Never used   Substance and Sexual Activity    Alcohol use: No    Drug use: Never    Sexual activity: Not on file   Other Topics Concern    Not on file   Social History Narrative    Not on file     Social Determinants of Health     Financial Resource Strain:     Difficulty of Paying Living Expenses: Not on file   Food Insecurity:     Worried About Running Out of Food in the Last Year: Not on file    [place] of Food in the Last Year: Not on file   Transportation Needs:     Lack of Transportation (Medical): Not on file    Lack of Transportation (Non-Medical):  Not on file   Physical Activity:     Days of Exercise per Week: Not on file    Minutes of Exercise per Session: Not on file   Stress:     Feeling of Stress : Not on file   Social Connections:     Frequency of Communication with Friends and Family: Not on file    Frequency of Social Gatherings with Friends and Family: Not on file    Attends Anabaptism Services: Not on file    Active Member of Clubs or Organizations: Not on file    Attends Club or Organization Meetings: Not on file    Marital Status: Not on file   Intimate Partner Violence:     Fear of Current or Ex-Partner: Not on file    Emotionally Abused: Not on file    Physically Abused: Not on file    Sexually Abused: Not on file   Housing Stability:     Unable to Pay for Housing in the Last Year: Not on file    Number of [place] in the Last Year: Not on file    Unstable Housing in the Last Year: Not on file       Visit Vitals  Pulse 67   Temp 98.4 °F (36.9 °C) (Temporal)   Wt 72.6 kg (160 lb)   SpO2 97%   BMI 27.46 kg/m²         PHYSICAL EXAMINATION:  GENERAL: Alert and oriented x3, in no acute distress, well-developed, well-nourished, afebrile. HEART: No JVD. EYES: No scleral icterus   NECK: No significant lymphadenopathy   LUNGS: No respiratory compromise or indrawing  ABDOMEN: Soft, non-tender, non-distended. Note: This note was completed using voice recognition software. Any typographical/name errors or mistakes are unintentional.    Electronically signed by: MD ASIYA Graves [employer] Deanna Dikes, M.D., have reviewed the history, physical, and have updated the allergic reactions for Patricia J Colon.     TIME OUT performed immediately prior to the start of procedure:  Jose BRADEN M.D., have performed the following reviews on Patricia J Colon prior to the start of the procedure:    - Patient was identified by name and date of birth  - Agreement on procedure being performed was verified  - Risks and benefits explained to the patient  - Patient was positioned for comfort  - Consent was signed and verified  - Patient was advised regarding risks of bruising, bleeding, infection and pain    Time: 10:58 AM     Body Part: intra-bursal injection of right hip and intra-articular injection of right knee    Medication and Dose: Each injected with 1mL Celestone preparation, i.e. 6 mg, and 3 mL 1% lidocaine    Date of Procedure: 04/22/22    PROCEDURE PERFORMED BY : Loree Harper M.D., [facility])    Provider Assisted by: Nancy Ma    Patient assisted by: self    Patient tolerated procedure well with no complications

## 2022-05-09 ENCOUNTER — TRANSCRIBE ORDER (OUTPATIENT)
Dept: REGISTRATION | Age: 67
End: 2022-05-09

## 2022-05-09 ENCOUNTER — HOSPITAL ENCOUNTER (OUTPATIENT)
Dept: GENERAL RADIOLOGY | Age: 67
Discharge: HOME | End: 2022-05-09
Payer: MEDICARE

## 2022-05-09 DIAGNOSIS — M25.559 HIP PAIN: Primary | ICD-10-CM

## 2022-05-09 DIAGNOSIS — M25.559 HIP PAIN: ICD-10-CM

## 2022-05-09 PROCEDURE — 73502 X-RAY EXAM HIP UNI 2-3 VIEWS: CPT

## 2022-05-23 ENCOUNTER — TELEPHONE (OUTPATIENT)
Dept: CARDIOLOGY CLINIC | Age: 67
End: 2022-05-23

## 2022-05-23 DIAGNOSIS — E78.00 HYPERCHOLESTEREMIA: Primary | ICD-10-CM

## 2022-05-24 ENCOUNTER — HOSPITAL ENCOUNTER (OUTPATIENT)
Dept: LAB | Age: 67
Discharge: HOME | End: 2022-05-24
Payer: MEDICARE

## 2022-05-24 DIAGNOSIS — E78.00 HYPERCHOLESTEREMIA: ICD-10-CM

## 2022-05-24 LAB
ALBUMIN SERPL-MCNC: 3.8 G/DL (ref 3.4–5)
ALBUMIN/GLOB SERPL: 1.3 {RATIO} (ref 0.8–1.7)
ALP SERPL-CCNC: 53 U/L (ref 45–117)
ALT SERPL-CCNC: 23 U/L (ref 13–56)
AST SERPL W P-5'-P-CCNC: 19 U/L (ref 10–38)
BILIRUB DIRECT SERPL-MCNC: 0.2 MG/DL (ref 0–0.2)
BILIRUB SERPL-MCNC: 0.8 MG/DL (ref 0.2–1)
CHOLEST SERPL-MCNC: 118 MG/DL
GLOBULIN SER CALC-MCNC: 3 G/DL (ref 2–4)
HDLC SERPL-MCNC: 39 MG/DL (ref 40–60)
HDLC SERPL: 3 {RATIO} (ref 0–5)
LDLC SERPL CALC-MCNC: 72 MG/DL (ref 0–100)
LIPID PROFILE,FLP: ABNORMAL
PROT SERPL-MCNC: 6.8 G/DL (ref 6.4–8.2)
TRIGL SERPL-MCNC: 35 MG/DL (ref ?–150)
VLDLC SERPL CALC-MCNC: 7 MG/DL

## 2022-05-24 PROCEDURE — 80061 LIPID PANEL: CPT

## 2022-05-24 PROCEDURE — 36415 COLL VENOUS BLD VENIPUNCTURE: CPT

## 2022-05-24 PROCEDURE — 80076 HEPATIC FUNCTION PANEL: CPT

## 2022-05-25 ENCOUNTER — TELEPHONE (OUTPATIENT)
Dept: CARDIOLOGY CLINIC | Age: 67
End: 2022-05-25

## 2022-05-26 NOTE — TELEPHONE ENCOUNTER
Labs reviewed - please let patient know lipids are controlled. LFT normal.  Continue current medications.

## 2022-05-26 NOTE — TELEPHONE ENCOUNTER
Spoke with patient per Dr. Navi Segal NP regarding lab/test. Lab reviewed. Please let patient know lipids are controlled. LFT normal. Continue current medications. She voices understanding and acceptance of this advice and will call back if any further questions or concerns.

## 2022-05-27 DIAGNOSIS — E78.00 HYPERCHOLESTEREMIA: ICD-10-CM

## 2022-05-27 RX ORDER — ATORVASTATIN CALCIUM 10 MG/1
10 TABLET, FILM COATED ORAL DAILY
Qty: 90 TABLET | Refills: 3 | Status: SHIPPED | OUTPATIENT
Start: 2022-05-27

## 2022-06-23 ENCOUNTER — APPOINTMENT (OUTPATIENT)
Dept: CT IMAGING | Age: 67
End: 2022-06-23
Attending: INTERNAL MEDICINE
Payer: MEDICARE

## 2022-06-23 ENCOUNTER — HOSPITAL ENCOUNTER (EMERGENCY)
Age: 67
Discharge: HOME | End: 2022-06-23
Attending: INTERNAL MEDICINE
Payer: MEDICARE

## 2022-06-23 VITALS
RESPIRATION RATE: 18 BRPM | DIASTOLIC BLOOD PRESSURE: 59 MMHG | OXYGEN SATURATION: 98 % | SYSTOLIC BLOOD PRESSURE: 112 MMHG | HEART RATE: 71 BPM | WEIGHT: 160 LBS | HEIGHT: 64 IN | TEMPERATURE: 97.9 F | BODY MASS INDEX: 27.31 KG/M2

## 2022-06-23 DIAGNOSIS — R11.2 NAUSEA VOMITING AND DIARRHEA: Primary | ICD-10-CM

## 2022-06-23 DIAGNOSIS — N30.00 ACUTE CYSTITIS WITHOUT HEMATURIA: ICD-10-CM

## 2022-06-23 DIAGNOSIS — R19.7 NAUSEA VOMITING AND DIARRHEA: Primary | ICD-10-CM

## 2022-06-23 LAB
ALBUMIN SERPL-MCNC: 3.6 G/DL (ref 3.4–5)
ALBUMIN/GLOB SERPL: 1.1 {RATIO} (ref 0.8–1.7)
ALP SERPL-CCNC: 63 U/L (ref 45–117)
ALT SERPL-CCNC: 69 U/L (ref 13–56)
ANION GAP SERPL CALC-SCNC: 6 MMOL/L (ref 3–18)
APPEARANCE UR: ABNORMAL
AST SERPL W P-5'-P-CCNC: 45 U/L (ref 10–38)
BACTERIA URNS QL MICRO: ABNORMAL /HPF
BASOPHILS # BLD: 0 K/UL (ref 0–0.1)
BASOPHILS NFR BLD: 0 % (ref 0–2)
BILIRUB SERPL-MCNC: 0.6 MG/DL (ref 0.2–1)
BILIRUB UR QL: NEGATIVE
BUN SERPL-MCNC: 14 MG/DL (ref 7–18)
BUN/CREAT SERPL: 23 (ref 12–20)
C DIFF GDH STL QL: NEGATIVE
C DIFF TOX A+B STL QL IA: NEGATIVE
CA-I BLD-MCNC: 8.5 MG/DL (ref 8.5–10.1)
CHLORIDE SERPL-SCNC: 112 MMOL/L (ref 100–111)
CO2 SERPL-SCNC: 25 MMOL/L (ref 21–32)
COLLECT DATE STL: NORMAL
COLOR UR: ABNORMAL
CREAT SERPL-MCNC: 0.6 MG/DL (ref 0.6–1.3)
DIFFERENTIAL METHOD BLD: ABNORMAL
EOSINOPHIL # BLD: 0.4 K/UL (ref 0–0.4)
EOSINOPHIL NFR BLD: 8 % (ref 0–5)
EPITH CASTS URNS QL MICRO: ABNORMAL /LPF (ref 0–20)
ERYTHROCYTE [DISTWIDTH] IN BLOOD BY AUTOMATED COUNT: 12 % (ref 11.6–14.5)
FLUAV AG NPH QL IA: NEGATIVE
FLUBV AG NOSE QL IA: NEGATIVE
GLOBULIN SER CALC-MCNC: 3.3 G/DL (ref 2–4)
GLUCOSE SERPL-MCNC: 94 MG/DL (ref 74–99)
GLUCOSE UR STRIP.AUTO-MCNC: NEGATIVE MG/DL
HCT VFR BLD AUTO: 38.3 % (ref 35–45)
HEMOCCULT SP1 STL QL: NEGATIVE
HGB BLD-MCNC: 13 G/DL (ref 12–16)
HGB UR QL STRIP: NEGATIVE
IMM GRANULOCYTES # BLD AUTO: 0 K/UL (ref 0–0.04)
IMM GRANULOCYTES NFR BLD AUTO: 0 % (ref 0–0.5)
INTERPRETATION: NORMAL
KETONES UR QL STRIP.AUTO: ABNORMAL MG/DL
LACTATE SERPL-SCNC: 0.8 MMOL/L (ref 0.4–2)
LEUKOCYTE ESTERASE UR QL STRIP.AUTO: ABNORMAL
LIPASE SERPL-CCNC: 86 U/L (ref 73–393)
LYMPHOCYTES # BLD: 1.9 K/UL (ref 0.9–3.6)
LYMPHOCYTES NFR BLD: 36 % (ref 21–52)
MAGNESIUM SERPL-MCNC: 2.2 MG/DL (ref 1.6–2.6)
MCH RBC QN AUTO: 31.8 PG (ref 24–34)
MCHC RBC AUTO-ENTMCNC: 33.9 G/DL (ref 31–37)
MCV RBC AUTO: 93.6 FL (ref 78–100)
MONOCYTES # BLD: 0.5 K/UL (ref 0.05–1.2)
MONOCYTES NFR BLD: 9 % (ref 3–10)
MUCOUS THREADS URNS QL MICRO: ABNORMAL /LPF
NEUTS SEG # BLD: 2.5 K/UL (ref 1.8–8)
NEUTS SEG NFR BLD: 47 % (ref 40–73)
NITRITE UR QL STRIP.AUTO: NEGATIVE
NRBC # BLD: 0 K/UL (ref 0–0.01)
NRBC BLD-RTO: 0 PER 100 WBC
PH UR STRIP: 5.5 [PH] (ref 5–8)
PLATELET # BLD AUTO: 211 K/UL (ref 135–420)
PMV BLD AUTO: 9.5 FL (ref 9.2–11.8)
POTASSIUM SERPL-SCNC: 3.1 MMOL/L (ref 3.5–5.5)
PROT SERPL-MCNC: 6.9 G/DL (ref 6.4–8.2)
PROT UR STRIP-MCNC: ABNORMAL MG/DL
RBC # BLD AUTO: 4.09 M/UL (ref 4.2–5.3)
RBC #/AREA URNS HPF: ABNORMAL /HPF (ref 0–2)
SODIUM SERPL-SCNC: 143 MMOL/L (ref 136–145)
SP GR UR REFRACTOMETRY: 1.03 (ref 1–1.03)
TROPONIN-HIGH SENSITIVITY: 4 NG/L (ref 0–54)
UROBILINOGEN UR QL STRIP.AUTO: 1 EU/DL (ref 0.2–1)
WBC # BLD AUTO: 5.3 K/UL (ref 4.6–13.2)
WBC URNS QL MICRO: ABNORMAL /HPF (ref 0–4)

## 2022-06-23 PROCEDURE — 87506 IADNA-DNA/RNA PROBE TQ 6-11: CPT

## 2022-06-23 PROCEDURE — 83735 ASSAY OF MAGNESIUM: CPT

## 2022-06-23 PROCEDURE — 74177 CT ABD & PELVIS W/CONTRAST: CPT

## 2022-06-23 PROCEDURE — 81001 URINALYSIS AUTO W/SCOPE: CPT

## 2022-06-23 PROCEDURE — 99285 EMERGENCY DEPT VISIT HI MDM: CPT

## 2022-06-23 PROCEDURE — 89055 LEUKOCYTE ASSESSMENT FECAL: CPT

## 2022-06-23 PROCEDURE — 74011000250 HC RX REV CODE- 250: Performed by: INTERNAL MEDICINE

## 2022-06-23 PROCEDURE — 84484 ASSAY OF TROPONIN QUANT: CPT

## 2022-06-23 PROCEDURE — 74011250637 HC RX REV CODE- 250/637: Performed by: INTERNAL MEDICINE

## 2022-06-23 PROCEDURE — 82272 OCCULT BLD FECES 1-3 TESTS: CPT

## 2022-06-23 PROCEDURE — 80053 COMPREHEN METABOLIC PANEL: CPT

## 2022-06-23 PROCEDURE — 74011250636 HC RX REV CODE- 250/636: Performed by: INTERNAL MEDICINE

## 2022-06-23 PROCEDURE — 87804 INFLUENZA ASSAY W/OPTIC: CPT

## 2022-06-23 PROCEDURE — 83690 ASSAY OF LIPASE: CPT

## 2022-06-23 PROCEDURE — 74011000636 HC RX REV CODE- 636: Performed by: INTERNAL MEDICINE

## 2022-06-23 PROCEDURE — 85025 COMPLETE CBC W/AUTO DIFF WBC: CPT

## 2022-06-23 PROCEDURE — 96374 THER/PROPH/DIAG INJ IV PUSH: CPT

## 2022-06-23 PROCEDURE — 87324 CLOSTRIDIUM AG IA: CPT

## 2022-06-23 PROCEDURE — 96361 HYDRATE IV INFUSION ADD-ON: CPT

## 2022-06-23 PROCEDURE — 83605 ASSAY OF LACTIC ACID: CPT

## 2022-06-23 RX ORDER — LEVOFLOXACIN 250 MG/1
500 TABLET ORAL
Status: COMPLETED | OUTPATIENT
Start: 2022-06-23 | End: 2022-06-23

## 2022-06-23 RX ORDER — SODIUM CHLORIDE 0.9 % (FLUSH) 0.9 %
5-10 SYRINGE (ML) INJECTION
Status: COMPLETED | OUTPATIENT
Start: 2022-06-23 | End: 2022-06-23

## 2022-06-23 RX ORDER — LOPERAMIDE HYDROCHLORIDE 2 MG/1
2 CAPSULE ORAL
Qty: 20 CAPSULE | Refills: 0 | Status: SHIPPED | OUTPATIENT
Start: 2022-06-23 | End: 2022-07-03

## 2022-06-23 RX ORDER — NITROFURANTOIN 25; 75 MG/1; MG/1
100 CAPSULE ORAL 2 TIMES DAILY
Qty: 6 CAPSULE | Refills: 0 | Status: SHIPPED | OUTPATIENT
Start: 2022-06-23 | End: 2022-06-26

## 2022-06-23 RX ORDER — NAPROXEN 500 MG/1
TABLET ORAL
COMMUNITY
Start: 2022-04-28

## 2022-06-23 RX ORDER — ONDANSETRON 4 MG/1
4 TABLET, FILM COATED ORAL
Qty: 12 TABLET | Refills: 0 | Status: SHIPPED | OUTPATIENT
Start: 2022-06-23

## 2022-06-23 RX ORDER — POTASSIUM CHLORIDE 750 MG/1
40 TABLET, EXTENDED RELEASE ORAL
Status: COMPLETED | OUTPATIENT
Start: 2022-06-23 | End: 2022-06-23

## 2022-06-23 RX ORDER — ONDANSETRON 2 MG/ML
4 INJECTION INTRAMUSCULAR; INTRAVENOUS
Status: COMPLETED | OUTPATIENT
Start: 2022-06-23 | End: 2022-06-23

## 2022-06-23 RX ORDER — LEVOTHYROXINE SODIUM 75 UG/1
TABLET ORAL
COMMUNITY
Start: 2022-05-31

## 2022-06-23 RX ADMIN — POTASSIUM CHLORIDE 40 MEQ: 750 TABLET, EXTENDED RELEASE ORAL at 10:32

## 2022-06-23 RX ADMIN — LEVOFLOXACIN 500 MG: 250 TABLET, FILM COATED ORAL at 10:32

## 2022-06-23 RX ADMIN — IOPAMIDOL 94 ML: 755 INJECTION, SOLUTION INTRAVENOUS at 08:28

## 2022-06-23 RX ADMIN — ONDANSETRON 4 MG: 2 INJECTION INTRAMUSCULAR; INTRAVENOUS at 08:02

## 2022-06-23 RX ADMIN — SODIUM CHLORIDE 1000 ML: 9 INJECTION, SOLUTION INTRAVENOUS at 08:02

## 2022-06-23 RX ADMIN — Medication 10 ML: at 08:20

## 2022-06-23 NOTE — ED PROVIDER NOTES
EMERGENCY DEPARTMENT HISTORY AND PHYSICAL EXAM      Date: 6/23/2022  Patient Name: Patrice Huerta      History of Presenting Illness     Chief Complaint   Patient presents with    Diarrhea    Vomiting       History Provided By: Patient    HPI: Lux Brown, 78 y.o. female with a past medical history significant for kidney stones; hypothyroid; cholecystectomy; MELVIN; bladder suspension; meningioma that presents to the ED with cc of n/v/d since Monday night. States that Monday night; she developed diarrhea followed by nausea and vomiting and that she's not been able to keep anything down since. States that she's been having chills; the stool is foul smelling; watery; brown; nonbloody. Has mild dysuria and urine dark. Has not taken anything for the diarrhea. Called her GI doctor; Dr. Natty Gibbs who told her to come to the ER and have a CT scan to see if she has colitis which she states that she had many yrs ago. The abd pain in dull; periumbilical; epigastric; right flank area and both hamstring areas. No recent travel or antibiotic use. There are no other complaints, changes, or physical findings at this time. PCP: Herlinda Ivan MD    Current Outpatient Medications   Medication Sig Dispense Refill    loperamide (IMODIUM) 2 mg capsule Take 1 Capsule by mouth four (4) times daily as needed for Diarrhea for up to 10 days. 20 Capsule 0    ondansetron hcl (Zofran) 4 mg tablet Take 1 Tablet by mouth every eight (8) hours as needed for Nausea. 12 Tablet 0    nitrofurantoin, macrocrystal-monohydrate, (Macrobid) 100 mg capsule Take 1 Capsule by mouth two (2) times a day for 3 days. 6 Capsule 0    naproxen (NAPROSYN) 500 mg tablet TAKE 1 TABLET BY MOUTH TWICE DAILY AS NEEDED FOR PAIN      Euthyrox 75 mcg tablet TAKE 1 TABLET BY MOUTH ONCE DAILY FOR 30 DAYS      atorvastatin (LIPITOR) 10 mg tablet Take 1 Tablet by mouth daily.  90 Tablet 3    guaiFENesin-dextromethorphan SR (Mucinex DM) 600-30 mg per tablet Take 1 Tablet by mouth two (2) times a day. (Patient not taking: Reported on 4/22/2022) 20 Tablet 0    lidocaine 4 % patch 1 patch q 12 hours for pain  Indications: Pain 1 Package 1    ibuprofen (MOTRIN) 400 mg tablet Take 1 Tablet by mouth every six (6) hours as needed for Pain. 20 Tablet 0    cyclobenzaprine (FLEXERIL) 10 mg tablet Take 1 Tablet by mouth three (3) times daily as needed for Muscle Spasm(s). 30 Tablet 0    acetaminophen (TYLENOL) 650 mg TbER Take 1,300 mg by mouth two (2) times a day.  budesonide-formoteroL (Symbicort) 160-4.5 mcg/actuation HFAA Take 2 Puffs by inhalation.  polyethylene glycol (Miralax) 17 gram/dose powder Take 17 g by mouth two (2) times a day.  fluocinoNIDE (LIDEX) 0.05 % ointment Apply  to affected area two (2) times a day.  L.acidophilus/B. bifidum,longum (PROBIOTIC COLON SUPPORT PO) Take  by mouth daily.  omeprazole (PRILOSEC) 20 mg capsule Take 40 mg by mouth daily.  aspirin delayed-release 81 mg tablet Take  by mouth daily.  gabapentin (Neurontin) 600 mg tablet Take 600 mg by mouth daily.  conjugated estrogens (PREMARIN) 0.625 mg/gram vaginal cream Insert 0.5 g into vagina daily.          Past History     Past Medical History:  Past Medical History:   Diagnosis Date    Arthritis     Endocrine disorder     Endometriosis     Hematuria     Hypercholesteremia 10/17/2019    Kidney stones     Menopause     Osteoporosis     Pulmonary nodules     Thyroid disease     UTI (urinary tract infection)        Past Surgical History:  Past Surgical History:   Procedure Laterality Date    HX APPENDECTOMY      HX BLADDER SUSPENSION      HX BREAST BIOPSY Left     Benign done [facility] w/ Dr Olivia Bryan    HX BREAST LUMPECTOMY      HX CHOLECYSTECTOMY      HX HYSTERECTOMY      MELVIN at [facility] w/ Dr Olivia Batmean    HX OOPHORECTOMY         Family History:  Family History   Problem Relation Age of Onset    Other Mother         irregular heart beat    Breast Cancer Mother     Stroke Father 61    Stroke Sister 72    Heart Surgery Sister 76    Coronary Art Dis Sister     Cancer Sister        Social History:  Social History     Tobacco Use    Smoking status: Never Smoker    Smokeless tobacco: Never Used   Vaping Use    Vaping Use: Never used   Substance Use Topics    Alcohol use: No    Drug use: Never       Allergies: Allergies   Allergen Reactions    Darvocet A500 [Propoxyphene N-Acetaminophen] Nausea and Vomiting    Amitriptyline Other (comments)    Demerol [Meperidine] Other (comments)         Review of Systems     Review of Systems   Constitutional: Negative for chills and fever. HENT: Negative for trouble swallowing. Eyes: Negative for visual disturbance. Respiratory: Negative for cough and shortness of breath. Cardiovascular: Negative for chest pain and palpitations. Gastrointestinal: Positive for abdominal pain, diarrhea, nausea and vomiting. Negative for blood in stool. Genitourinary: Positive for dysuria and flank pain. Negative for frequency and urgency. Musculoskeletal: Negative for arthralgias. Skin: Negative for rash. Neurological: Negative for headaches. Psychiatric/Behavioral: Negative for confusion. Physical Exam     Physical Exam  Vitals and nursing note reviewed. Constitutional:       General: She is not in acute distress. Appearance: She is well-developed. HENT:      Head: Normocephalic. Mouth/Throat:      Pharynx: Oropharynx is clear. Eyes:      Conjunctiva/sclera: Conjunctivae normal.      Pupils: Pupils are equal, round, and reactive to light. Neck:      Thyroid: No thyromegaly. Vascular: No JVD. Cardiovascular:      Rate and Rhythm: Normal rate and regular rhythm. Heart sounds: No murmur heard. Pulmonary:      Effort: Pulmonary effort is normal. No respiratory distress. Breath sounds: Normal breath sounds. No wheezing.    Abdominal:      General: Bowel sounds are normal. There is no distension. Palpations: Abdomen is soft. Tenderness: There is no abdominal tenderness. There is no guarding or rebound. Musculoskeletal:         General: Normal range of motion. Cervical back: Normal range of motion. Right lower leg: No edema. Left lower leg: No edema. Skin:     General: Skin is warm. Neurological:      Mental Status: She is alert and oriented to person, place, and time.          Lab and Diagnostic Study Results     Labs -     Recent Results (from the past 12 hour(s))   URINALYSIS W/ RFLX MICROSCOPIC    Collection Time: 06/23/22  7:10 AM   Result Value Ref Range    Color Dark Yellow      Appearance Cloudy      Specific gravity 1.028 1.005 - 1.030      pH (UA) 5.5 5.0 - 8.0      Protein Trace (A) Negative mg/dL    Glucose Negative Negative mg/dL    Ketone Trace (A) Negative mg/dL    Bilirubin Negative Negative      Blood Negative Negative      Urobilinogen 1.0 0.2 - 1.0 EU/dL    Nitrites Negative Negative      Leukocyte Esterase Trace (A) Negative     URINE MICROSCOPIC    Collection Time: 06/23/22  7:10 AM   Result Value Ref Range    WBC 5-10 0 - 4 /hpf    RBC 0-5 0 - 2 /hpf    Epithelial cells Moderate 0 - 20 /lpf    Bacteria 3+ (A) None /hpf    Mucus 2+ /lpf   INFLUENZA A & B AG (RAPID TEST)    Collection Time: 06/23/22  7:25 AM   Result Value Ref Range    Influenza A Antigen Negative Negative      Influenza B Antigen Negative Negative     CBC WITH AUTOMATED DIFF    Collection Time: 06/23/22  7:25 AM   Result Value Ref Range    WBC 5.3 4.6 - 13.2 K/uL    RBC 4.09 (L) 4.20 - 5.30 M/uL    HGB 13.0 12.0 - 16.0 g/dL    HCT 38.3 35.0 - 45.0 %    MCV 93.6 78.0 - 100.0 FL    MCH 31.8 24.0 - 34.0 PG    MCHC 33.9 31.0 - 37.0 g/dL    RDW 12.0 11.6 - 14.5 %    PLATELET 029 368 - 733 K/uL    MPV 9.5 9.2 - 11.8 FL    NRBC 0.0 0.0  WBC    ABSOLUTE NRBC 0.00 0.00 - 0.01 K/uL    NEUTROPHILS 47 40 - 73 %    LYMPHOCYTES 36 21 - 52 %    MONOCYTES 9 3 - 10 % EOSINOPHILS 8 (H) 0 - 5 %    BASOPHILS 0 0 - 2 %    IMMATURE GRANULOCYTES 0 0 - 0.5 %    ABS. NEUTROPHILS 2.5 1.8 - 8.0 K/UL    ABS. LYMPHOCYTES 1.9 0.9 - 3.6 K/UL    ABS. MONOCYTES 0.5 0.05 - 1.2 K/UL    ABS. EOSINOPHILS 0.4 0.0 - 0.4 K/UL    ABS. BASOPHILS 0.0 0.0 - 0.1 K/UL    ABS. IMM. GRANS. 0.0 0.00 - 0.04 K/UL    DF AUTOMATED     METABOLIC PANEL, COMPREHENSIVE    Collection Time: 06/23/22  7:25 AM   Result Value Ref Range    Sodium 143 136 - 145 mmol/L    Potassium 3.1 (L) 3.5 - 5.5 mmol/L    Chloride 112 (H) 100 - 111 mmol/L    CO2 25 21 - 32 mmol/L    Anion gap 6 3.0 - 18.0 mmol/L    Glucose 94 74 - 99 mg/dL    BUN 14 7 - 18 mg/dL    Creatinine 0.60 0.60 - 1.30 mg/dL    BUN/Creatinine ratio 23 (H) 12 - 20      GFR est AA >60 >60 ml/min/1.73m2    GFR est non-AA >60 >60 ml/min/1.73m2    Calcium 8.5 8.5 - 10.1 mg/dL    Bilirubin, total 0.6 0.2 - 1.0 mg/dL    AST (SGOT) 45 (H) 10 - 38 U/L    ALT (SGPT) 69 (H) 13 - 56 U/L    Alk. phosphatase 63 45 - 117 U/L    Protein, total 6.9 6.4 - 8.2 g/dL    Albumin 3.6 3.4 - 5.0 g/dL    Globulin 3.3 2.0 - 4.0 g/dL    A-G Ratio 1.1 0.8 - 1.7     TROPONIN-HIGH SENSITIVITY    Collection Time: 06/23/22  8:00 AM   Result Value Ref Range    Troponin-High Sensitivity 4 0 - 54 ng/L   LACTIC ACID    Collection Time: 06/23/22  8:00 AM   Result Value Ref Range    Lactic acid 0.8 0.4 - 2.0 mmol/L   MAGNESIUM    Collection Time: 06/23/22  8:00 AM   Result Value Ref Range    Magnesium 2.2 1.6 - 2.6 mg/dL   LIPASE    Collection Time: 06/23/22  8:00 AM   Result Value Ref Range    Lipase 86 73 - 393 U/L   OCCULT BLOOD, STOOL    Collection Time: 06/23/22  8:50 AM   Result Value Ref Range    Occult Blood,day 1 Negative      Day 1 date: 6,232,022         Radiologic Studies -   [unfilled]  CT Results  (Last 48 hours)               06/23/22 0844  CT ABD PELV W CONT Final result    Impression:      No acute intra-abdominal abnormality. Diverticulosis.        Narrative:  EXAM: CT of the abdomen and pelvis       INDICATION: Pain. COMPARISON: 7/29/2020       TECHNIQUE: Axial CT imaging of the abdomen and pelvis was performed with   intravenous contrast. Multiplanar reformats were generated. One or more dose reduction techniques were used on this CT: automated exposure   control, adjustment of the mAs and/or kVp according to patient size, and   iterative reconstruction techniques. The specific techniques used on this CT   exam have been documented in the patient's electronic medical record. Digital   Imaging and Communications in Medicine (DICOM) format image data are available   to nonaffiliated external healthcare facilities or entities on a secure, media   free, reciprocally searchable basis with patient authorization for at least a   12-month period after this study. _______________       FINDINGS:       LOWER CHEST: Unremarkable. LIVER, BILIARY: Liver is normal. No biliary dilation. Cholecystectomy clips. PANCREAS: Normal.       SPLEEN: Normal.       ADRENALS: Normal.       KIDNEYS/URETERS/BLADDER: No calcification, hydronephrosis, or soft tissue   attenuation renal mass. PELVIC ORGANS: Prior MELVIN/BSO.       VASCULATURE: Unremarkable       LYMPH NODES: No enlarged lymph nodes. GASTROINTESTINAL TRACT: No bowel dilation or wall thickening. Diverticulosis. BONES: No acute or aggressive osseous abnormalities identified. OTHER: None.       _______________               CXR Results  (Last 48 hours)    None          Medical Decision Making and ED Course   - I am the first and primary provider for this patient AND AM THE PRIMARY PROVIDER OF RECORD. - I reviewed the vital signs, available nursing notes, past medical history, past surgical history, family history and social history. - Initial assessment performed.  The patients presenting problems have been discussed, and the staff are in agreement with the care plan formulated and outlined with them.  I have encouraged them to ask questions as they arise throughout their visit. Vital Signs-Reviewed the patient's vital signs. Patient Vitals for the past 12 hrs:   Temp Pulse Resp BP SpO2   06/23/22 1043 -- 71 18 (!) 112/59 98 %   06/23/22 1000 -- 76 18 (!) 126/49 98 %   06/23/22 0934 -- 78 18 (!) 172/93 99 %   06/23/22 0900 -- 73 18 119/64 99 %   06/23/22 0724 -- -- -- -- 100 %   06/23/22 0720 97.9 °F (36.6 °C) 72 18 130/75 99 %           Records Reviewed: Old Medical Records      ED Course:   Infectious, viruses, E. coli, Giardia, Shigella, pseudomembranous colitis, drug reaction to antibiotics, ulcerative colitis, Crohn's disease, ischemic colitis, fecal impaction, malabsorption    11:20 AM  VSS; Afebrile; stool heme neg; normal wbc and labs and CT look ok. I called Dr Les Eastman but he is not there today. Spoke to [facility]; states to sent stool calprotectin [not on our lab list]; will give Immodium and zofran and they will follow her. Consultations:       Consultations:       Procedures and Critical Care         Disposition     Disposition: Discharge    Remove if not discharged  DISCHARGE PLAN:  1. Current Discharge Medication List      CONTINUE these medications which have NOT CHANGED    Details   naproxen (NAPROSYN) 500 mg tablet TAKE 1 TABLET BY MOUTH TWICE DAILY AS NEEDED FOR PAIN      Euthyrox 75 mcg tablet TAKE 1 TABLET BY MOUTH ONCE DAILY FOR 30 DAYS      atorvastatin (LIPITOR) 10 mg tablet Take 1 Tablet by mouth daily. Qty: 90 Tablet, Refills: 3    Comments: Please consider 90 day supplies to promote better adherence  Associated Diagnoses: Hypercholesteremia      guaiFENesin-dextromethorphan SR (Mucinex DM) 600-30 mg per tablet Take 1 Tablet by mouth two (2) times a day.   Qty: 20 Tablet, Refills: 0      lidocaine 4 % patch 1 patch q 12 hours for pain  Indications: Pain  Qty: 1 Package, Refills: 1      ibuprofen (MOTRIN) 400 mg tablet Take 1 Tablet by mouth every six (6) hours as needed for Pain. Qty: 20 Tablet, Refills: 0      cyclobenzaprine (FLEXERIL) 10 mg tablet Take 1 Tablet by mouth three (3) times daily as needed for Muscle Spasm(s). Qty: 30 Tablet, Refills: 0      loperamide-simethicone 2-125 mg tab Take 1 Tab by mouth. acetaminophen (TYLENOL) 650 mg TbER Take 1,300 mg by mouth two (2) times a day. budesonide-formoteroL (Symbicort) 160-4.5 mcg/actuation HFAA Take 2 Puffs by inhalation. polyethylene glycol (Miralax) 17 gram/dose powder Take 17 g by mouth two (2) times a day. fluocinoNIDE (LIDEX) 0.05 % ointment Apply  to affected area two (2) times a day. L.acidophilus/B. bifidum,longum (PROBIOTIC COLON SUPPORT PO) Take  by mouth daily. omeprazole (PRILOSEC) 20 mg capsule Take 40 mg by mouth daily. aspirin delayed-release 81 mg tablet Take  by mouth daily. gabapentin (Neurontin) 600 mg tablet Take 600 mg by mouth daily. conjugated estrogens (PREMARIN) 0.625 mg/gram vaginal cream Insert 0.5 g into vagina daily. 2.   Follow-up Information     Follow up With Specialties Details Why Contact Info      Schedule an appointment as soon as possible for a visit in 3 days  FOLLOW UP WITH DR DAVIS        3. Return to ED if worse   4. Current Discharge Medication List      START taking these medications    Details   loperamide (IMODIUM) 2 mg capsule Take 1 Capsule by mouth four (4) times daily as needed for Diarrhea for up to 10 days. Qty: 20 Capsule, Refills: 0  Start date: 6/23/2022, End date: 7/3/2022      ondansetron hcl (Zofran) 4 mg tablet Take 1 Tablet by mouth every eight (8) hours as needed for Nausea. Qty: 12 Tablet, Refills: 0  Start date: 6/23/2022      nitrofurantoin, macrocrystal-monohydrate, (Macrobid) 100 mg capsule Take 1 Capsule by mouth two (2) times a day for 3 days. Qty: 6 Capsule, Refills: 0  Start date: 6/23/2022, End date: 6/26/2022             Diagnosis     Clinical Impression:   1. Nausea vomiting and diarrhea    2. Acute cystitis without hematuria        Attestations:    Sofia Shabazz MD    Please note that this dictation was completed with [employer], the computer voice recognition software. Quite often unanticipated grammatical, syntax, homophones, and other interpretive errors are inadvertently transcribed by the computer software. Please disregard these errors. Please excuse any errors that have escaped final proofreading. Thank you.

## 2022-06-23 NOTE — ED TRIAGE NOTES
Vomiting and diarrhea x 4 days. Upper and lower abdominal pain lower pain radiates to the right lower back. Pain is 8/10 Burning with urination as well. Has a hx of colitis, called GI MD and was told to come to the ER.

## 2022-06-24 LAB
CAMPYLOBACTER SPECIES, DNA: NEGATIVE
ENTEROTOXIGEN E COLI, DNA: NEGATIVE
P SHIGELLOIDES DNA STL QL NAA+PROBE: NEGATIVE
SALMONELLA SPECIES, DNA: NEGATIVE
SHIGA TOXIN PRODUCING, DNA: NEGATIVE
SHIGELLA SP+EIEC IPAH STL QL NAA+PROBE: NEGATIVE
VIBRIO SPECIES, DNA: NEGATIVE
WBC,FECAL,WBCF: NORMAL /HPF (ref 0–4)
Y. ENTEROCOLITICA, DNA: NEGATIVE

## 2022-07-19 ENCOUNTER — HOSPITAL ENCOUNTER (OUTPATIENT)
Dept: LAB | Age: 67
Discharge: HOME | End: 2022-07-19
Payer: MEDICARE

## 2022-07-19 ENCOUNTER — TRANSCRIBE ORDER (OUTPATIENT)
Dept: REGISTRATION | Age: 67
End: 2022-07-19

## 2022-07-19 DIAGNOSIS — K58.9 IRRITABLE BOWEL SYNDROME: ICD-10-CM

## 2022-07-19 DIAGNOSIS — R74.8 ACID PHOSPHATASE ELEVATED: ICD-10-CM

## 2022-07-19 DIAGNOSIS — K21.9 ESOPHAGEAL REFLUX: ICD-10-CM

## 2022-07-19 DIAGNOSIS — R10.33 UMBILICAL PAIN: Primary | ICD-10-CM

## 2022-07-19 DIAGNOSIS — R10.33 UMBILICAL PAIN: ICD-10-CM

## 2022-07-19 LAB
ALBUMIN SERPL-MCNC: 3.7 G/DL (ref 3.4–5)
ALBUMIN/GLOB SERPL: 1.2 {RATIO} (ref 0.8–1.7)
ALP SERPL-CCNC: 55 U/L (ref 45–117)
ALT SERPL-CCNC: 19 U/L (ref 13–56)
AST SERPL W P-5'-P-CCNC: 16 U/L (ref 10–38)
BILIRUB DIRECT SERPL-MCNC: 0.3 MG/DL (ref 0–0.2)
BILIRUB SERPL-MCNC: 0.8 MG/DL (ref 0.2–1)
GLOBULIN SER CALC-MCNC: 3 G/DL (ref 2–4)
PROT SERPL-MCNC: 6.7 G/DL (ref 6.4–8.2)

## 2022-07-19 PROCEDURE — 36415 COLL VENOUS BLD VENIPUNCTURE: CPT

## 2022-07-19 PROCEDURE — 80076 HEPATIC FUNCTION PANEL: CPT

## 2022-08-24 ENCOUNTER — TRANSCRIBE ORDER (OUTPATIENT)
Dept: SCHEDULING | Age: 67
End: 2022-08-24

## 2022-08-24 DIAGNOSIS — Z12.31 VISIT FOR SCREENING MAMMOGRAM: Primary | ICD-10-CM

## 2022-10-03 ENCOUNTER — HOSPITAL ENCOUNTER (OUTPATIENT)
Dept: MAMMOGRAPHY | Age: 67
Discharge: HOME | End: 2022-10-03
Payer: MEDICARE

## 2022-10-03 DIAGNOSIS — Z12.31 VISIT FOR SCREENING MAMMOGRAM: ICD-10-CM

## 2022-10-03 PROCEDURE — 77063 BREAST TOMOSYNTHESIS BI: CPT

## 2022-10-10 ENCOUNTER — TRANSCRIBE ORDER (OUTPATIENT)
Dept: REGISTRATION | Age: 67
End: 2022-10-10

## 2022-10-10 ENCOUNTER — HOSPITAL ENCOUNTER (OUTPATIENT)
Dept: GENERAL RADIOLOGY | Age: 67
Discharge: HOME | End: 2022-10-10
Payer: MEDICARE

## 2022-10-10 DIAGNOSIS — K21.9 ACID REFLUX: Primary | ICD-10-CM

## 2022-10-10 DIAGNOSIS — K52.832 LYMPHOCYTIC COLITIS: ICD-10-CM

## 2022-10-10 DIAGNOSIS — R10.11 ABDOMINAL PAIN, RIGHT UPPER QUADRANT: ICD-10-CM

## 2022-10-10 DIAGNOSIS — R10.9 ABDOMINAL PAIN: ICD-10-CM

## 2022-10-10 DIAGNOSIS — R19.7 DIARRHEA OF PRESUMED INFECTIOUS ORIGIN: ICD-10-CM

## 2022-10-10 DIAGNOSIS — K21.9 ACID REFLUX: ICD-10-CM

## 2022-10-10 PROCEDURE — 71101 X-RAY EXAM UNILAT RIBS/CHEST: CPT

## 2023-02-11 ENCOUNTER — TRANSCRIBE ORDERS (OUTPATIENT)
Facility: HOSPITAL | Age: 68
End: 2023-02-11

## 2023-02-11 DIAGNOSIS — D32.9 MENINGIOMA (HCC): Primary | ICD-10-CM

## 2023-02-28 ENCOUNTER — OFFICE VISIT (OUTPATIENT)
Age: 68
End: 2023-02-28
Payer: MEDICARE

## 2023-02-28 VITALS
OXYGEN SATURATION: 97 % | DIASTOLIC BLOOD PRESSURE: 67 MMHG | BODY MASS INDEX: 26.8 KG/M2 | HEART RATE: 66 BPM | WEIGHT: 157 LBS | SYSTOLIC BLOOD PRESSURE: 117 MMHG | HEIGHT: 64 IN

## 2023-02-28 DIAGNOSIS — E78.00 PURE HYPERCHOLESTEROLEMIA, UNSPECIFIED: ICD-10-CM

## 2023-02-28 DIAGNOSIS — R94.31 ABNORMAL EKG: ICD-10-CM

## 2023-02-28 DIAGNOSIS — I34.0 NON-RHEUMATIC MITRAL REGURGITATION: ICD-10-CM

## 2023-02-28 DIAGNOSIS — R07.9 CHEST PAIN, UNSPECIFIED TYPE: Primary | ICD-10-CM

## 2023-02-28 PROCEDURE — 1123F ACP DISCUSS/DSCN MKR DOCD: CPT | Performed by: INTERNAL MEDICINE

## 2023-02-28 PROCEDURE — 99214 OFFICE O/P EST MOD 30 MIN: CPT | Performed by: INTERNAL MEDICINE

## 2023-02-28 PROCEDURE — 93000 ELECTROCARDIOGRAM COMPLETE: CPT | Performed by: INTERNAL MEDICINE

## 2023-02-28 RX ORDER — RIZATRIPTAN BENZOATE 10 MG/1
TABLET ORAL
COMMUNITY
Start: 2022-11-29

## 2023-02-28 ASSESSMENT — ENCOUNTER SYMPTOMS
EYES NEGATIVE: 1
GASTROINTESTINAL NEGATIVE: 1
SHORTNESS OF BREATH: 1

## 2023-02-28 NOTE — PROGRESS NOTES
1. Have you been to the ER, urgent care clinic since your last visit? Hospitalized since your last visit? Yes, [facility]      2. Where do you normally have your labs drawn? [facility]    3. Do you need any refills today? No    4. Which local pharmacy do you use (enter pharmacy)? [facility]    5. Which mail order pharmacy do you use (enter pharmacy)? No     6. Are you here for surgical clearance and if so who will be doing your     procedure/surgery (care team)?    No

## 2023-02-28 NOTE — PROGRESS NOTES
Burt Boyd is a 75y.o. year old female. Follow-up of herb [facility]ANKS    2/28/2023 has been having increasing dyspnea on exertion for last 3 months since she had COVID in 12/22. But dyspnea started about a year ago. It gets worse when she has to carry some load like firewood inside the house. Feels more tired than in the past.  Chest pains under the left breast have been present intermittently and are not related to any activity. Gets almost daily mild edema which improves with compression stockings and leg elevation. No significant dizziness palpitations or loss of consciousness. Review of Systems   Constitutional: Negative. HENT: Negative. Eyes: Negative. Respiratory:  Positive for shortness of breath. Cardiovascular:  Positive for chest pain and leg swelling. Gastrointestinal: Negative. Endocrine: Negative. Genitourinary: Negative. Musculoskeletal: Negative. Neurological: Negative. Psychiatric/Behavioral: Negative. All other systems reviewed and are negative. Physical Exam  Vitals and nursing note reviewed. Constitutional:       Appearance: Normal appearance. HENT:      Head: Normocephalic and atraumatic. Nose: Nose normal.   Eyes:      Conjunctiva/sclera: Conjunctivae normal.   Cardiovascular:      Rate and Rhythm: Normal rate and regular rhythm. Pulses: Normal pulses. Heart sounds: Normal heart sounds. Pulmonary:      Effort: Pulmonary effort is normal.      Breath sounds: Normal breath sounds. Abdominal:      General: Bowel sounds are normal.      Palpations: Abdomen is soft. Musculoskeletal:         General: Normal range of motion. Right lower leg: No edema. Left lower leg: No edema. Skin:     General: Skin is warm and dry. Neurological:      General: No focal deficit present. Mental Status: She is alert and oriented to person, place, and time.    Psychiatric:         Mood and Affect: Mood normal. Behavior: Behavior normal.      Allergies   Allergen Reactions    Amitriptyline Other (See Comments)    Meperidine Other (See Comments)       Past Medical History:   Diagnosis Date    Arthritis     Endocrine disorder     Endometriosis     Hematuria     Hypercholesteremia 10/17/2019    Kidney stones     Menopause     Osteoporosis     Pulmonary nodules     Thyroid disease     UTI (urinary tract infection)        Family History   Problem Relation Age of Onset    Heart Surgery Sister 76    Coronary Art Dis Sister     Cancer Sister     Other Mother         irregular heart beat    Breast Cancer Mother     Stroke Father 61    Stroke Sister 72       Social History     Tobacco Use    Smoking status: Never    Smokeless tobacco: Never   Substance Use Topics    Alcohol use: No    Drug use: Never        Current Outpatient Medications   Medication Sig Dispense Refill    rizatriptan (MAXALT) 10 MG tablet TAKE 1 TABLET BY MOUTH AS DIRECTED AT ONSET OF HEADACHE -- MAY REPEAT IN 2 HOURS -- NO MORE THAN 2 PER DAY , 4 PER WEEK OR 10 PER MONTH      acetaminophen (TYLENOL) 650 MG extended release tablet Take 1,300 mg by mouth 2 times daily      aspirin 81 MG EC tablet Take by mouth daily      atorvastatin (LIPITOR) 10 MG tablet Take 10 mg by mouth daily      cyclobenzaprine (FLEXERIL) 10 MG tablet Take 10 mg by mouth 3 times daily as needed      dextromethorphan-guaiFENesin (MUCINEX DM)  MG per extended release tablet Take 1 tablet by mouth 2 times daily      estrogens conjugated (PREMARIN) 0.625 MG/GM CREA vaginal cream Place 0.5 g vaginally daily      fluocinonide (LIDEX) 0.05 % ointment Apply topically 2 times daily      gabapentin (NEURONTIN) 600 MG tablet Take 600 mg by mouth daily.       levothyroxine (SYNTHROID) 75 MCG tablet TAKE 1 TABLET BY MOUTH ONCE DAILY FOR 30 DAYS      lidocaine 4 % external patch 1 patch q 12 hours for pain  Indications: Pain      naproxen (NAPROSYN) 500 MG tablet TAKE 1 TABLET BY MOUTH TWICE DAILY AS NEEDED FOR PAIN      omeprazole (PRILOSEC) 20 MG delayed release capsule Take 40 mg by mouth daily      ondansetron (ZOFRAN) 4 MG tablet Take 4 mg by mouth every 8 hours as needed      polyethylene glycol (GLYCOLAX) 17 GM/SCOOP powder Take 17 g by mouth 2 times daily       No current facility-administered medications for this visit. Past Surgical History:   Procedure Laterality Date    APPENDECTOMY      BLADDER SUSPENSION      BREAST BIOPSY Left     Benign done [facility] w/ Dr Antionette Feldman (CERVIX STATUS UNKNOWN)      VERONIKA at [facility] w/ Dr Dimas Gettin/28/23 1038   BP: 117/67   Site: Left Upper Arm   Position: Sitting   Cuff Size: Medium Adult   Pulse: 66   SpO2: 97%   Weight: 157 lb (71.2 kg)   Height: 5' 4\" (1.626 m)          Diagnostic Studies:  I have reviewed the relevant tests done on the patient and show as follows  EKG tracings reviewed by me today. No results found for this or any previous visit (from the past 4464 hour(s)). Xray Result (most recent):  XR RIBS RIGHT INCLUDE CHEST (MIN 3 VIEWS) 10/10/2022    Narrative  EXAM: XR RIBS RT W PA CXR MIN 3 V    CLINICAL INDICATION/HISTORY: Pain.  -Additional: None    COMPARISON: 2021    TECHNIQUE: Frontal view of the chest and multiple views of the right ribs.    _______________    FINDINGS:    HEART AND MEDIASTINUM: Normal cardiac size and mediastinal contours. LUNGS AND PLEURAL SPACES: No focal pneumonic consolidation, pneumothorax, or  pleural effusion. BONY THORAX AND SOFT TISSUES: No acute osseous abnormality    _______________    Impression  No acute findings in the chest.    19  4:51 PM, 2019 12:00 AM (Final)    Narrative  This is a summary report. The complete report is available in the patient's medical record. If you cannot access the medical record, please contact the sending organization for a detailed fax or copy.     · Left Ventricle: Mild concentric hypertrophy. Estimated left ventricular ejection fraction is 56 - 60%. No regional wall motion abnormality noted. Mild (grade 1) left ventricular diastolic dysfunction. · Normal right ventricular size and function. · Left Atrium: Moderately dilated left atrium. · Mitral Valve: Mitral valve thickening. Mild mitral valve regurgitation. · Tricuspid Valve: Mild tricuspid valve regurgitation is present. There is no evidence of pulmonary hypertension. Signed by: Wong Rangel MD on 5/21/2019  4:51 PM, Signed by: Unknown Provider Result on 5/21/2019 12:00 AM    05/21/19 05/21/2019 12:24 PM, 05/21/2019 12:00 AM (Final)    Narrative  This is a summary report. The complete report is available in the patient's medical record. If you cannot access the medical record, please contact the sending organization for a detailed fax or copy. · Baseline ECG: Sinus rhythm, non-specific ST-T wave abnormalities. First degree AV block  · Gated SPECT: Left ventricular function post-stress was normal. Calculated ejection fraction is 72%. There is no evidence of transient ischemic dilation (TID). The TID ratio is 1.06.  · Inconclusive stress test.  · Left ventricular perfusion is probably normal.  · Myocardial perfusion imaging defect 1: There is a defect that is small to moderate in size with a mild reduction in uptake present in the apical to basal inferior location(s) that is non-reversible. There is normal wall motion in the defect area. Viability in the area is good. The defect appears to be an artifact caused by soft tissue. · Normal myocardial perfusion imaging. Myocardial perfusion imaging supports a low risk stress test.    Signed by: Rogelio Cooper MD on 5/21/2019 12:24 PM, Signed by: Unknown Provider Result on 5/21/2019 12:00 AM    No results found for this or any previous visit. Ms. Dilshad Scott has a reminder for a \"due or due soon\" health maintenance.  I have asked that she contact her primary care provider for follow-up on this health maintenance. Diagnostic Studies:  I have reviewed the relevant tests done on the patient and show as follows  EKG tracings reviewed by me today. EKG Results       Procedure 720 Value Units Date/Time    AMB POC EKG ROUTINE W/ 12 LEADS, INTER & REP [[id number]] Resulted: 02/15/21 1132    Order Status: Completed Updated: 02/15/21 1127          XR Results (most recent):  Results from [place] encounter on 10/16/20   XR CHEST PORT    Narrative EXAM: XR CHEST PORT    CLINICAL INDICATION/HISTORY: Cough    > Additional: None. COMPARISON: None. TECHNIQUE: Portable chest    _______________    FINDINGS:    SUPPORT DEVICES: None. HEART AND MEDIASTINUM: Normal size and contour. Normal pulmonary vasculature. LUNGS AND PLEURAL SPACES: The lungs are well expanded and clear. No focal  consolidation, effusion, or pneumothorax. BONY THORAX AND SOFT TISSUES: No acute osseous abnormality. _______________      Impression IMPRESSION:    No active cardiopulmonary disease. 5/19 ECHO  Interpretation Summary        Left Ventricle: Mild concentric hypertrophy. Estimated left ventricular ejection fraction is 56 - 60%. No regional wall motion abnormality noted. Mild (grade 1) left ventricular diastolic dysfunction. Normal right ventricular size and function. Left Atrium: Moderately dilated left atrium. Mitral Valve: Mitral valve thickening. Mild mitral valve regurgitation. Tricuspid Valve: Mild tricuspid valve regurgitation is present. There is no evidence of pulmonary hypertension. Comparison Study Information     Prior Study     There is a prior study available for comparison that was performed on 5/21/2015. As compared to the previous study, there are no significant changes. 5/19 Nuclear Stress Test     Normal myocardial perfusion imaging.  Myocardial perfusion imaging supports a low risk stress test.   There is a prior study available for comparison. Interpretation Summary     Baseline ECG: Sinus rhythm, non-specific ST-T wave abnormalities. First degree AV block  Gated SPECT: Left ventricular function post-stress was normal. Calculated ejection fraction is 72%. There is no evidence of transient ischemic dilation (TID). The TID ratio is 1.06. Inconclusive stress test.  Left ventricular perfusion is probably normal.  Myocardial perfusion imaging defect 1: There is a defect that is small to moderate in size with a mild reduction in uptake present in the apical to basal inferior location(s) that is non-reversible. There is normal wall motion in the defect area. Viability in the area is good. The defect appears to be an artifact caused by soft tissue. Normal myocardial perfusion imaging. Myocardial perfusion imaging supports a low risk stress test.          ASSESSMENT and PLAN    Mitral regurgitation: 5/19  Mild  HLD :     Lab Results   Component Value Date    CHOL 118 05/24/2022    CHOL 119 07/27/2021    CHOL 101 (L) 07/19/2019    CHOL 173 06/13/2019     Lab Results   Component Value Date    TRIG 35 05/24/2022    TRIG 57 07/27/2021    TRIG 67 07/19/2019    TRIG 71 06/13/2019     Lab Results   Component Value Date    HDL 39 (L) 05/24/2022    HDL 40 07/27/2021    HDL 32 (L) 07/19/2019    HDL 3.2 07/19/2019    HDL 34 (L) 06/13/2019    HDL 5.1 06/13/2019     Lab Results   Component Value Date    LDLCALC 72 05/24/2022    LDLCALC 67.6 07/27/2021    LDLCALC 56 07/19/2019    LDLCALC 125 (H) 06/13/2019     Lab Results   Component Value Date    LABVLDL 7 05/24/2022    LABVLDL 11.4 07/27/2021    LABVLDL 13 07/19/2019    LABVLDL 14 06/13/2019     Lab Results   Component Value Date    CHOLHDLRATIO 3.0 05/24/2022    CHOLHDLRATIO 3.0 07/27/2021       Chest pain complaint today is constant for the last 2 days, is positional and relieved moderately with Tylenol. Likely it is musculoskeletal.  Would not recommend any cardiac catheterizations yet. Paola Foster Recommended that she try Aleve for 3 days twice a day and see how the pain does. She is in agreement with the plan. Will recheck lipids. Patient is also understanding and convinced that this pain is musculoskeletal.    2/21 continues to have precordial pain which is now constant and is reproducible with local tenderness. Explained to the patient again that is musculoskeletal.  She understands. No further work-up recommended yet. Edema is controlled well with stockings. MR is stable. Follow clinically. Patient told to call back if there is any significant shortness of breath or worsening of edema. Kev Freeman was seen today for follow-up. Diagnoses and all orders for this visit:    Chest pain, unspecified type  -     EKG 12 Lead  -     Nuclear stress test with myocardial perfusion; Future  -     Basic Metabolic Panel; Future    Abnormal EKG  -     Nuclear stress test with myocardial perfusion; Future    Pure hypercholesterolemia, unspecified  -     Hepatic Function Panel; Future  -     Lipid Panel; Future    Non-rheumatic mitral regurgitation  -     Basic Metabolic Panel; Future        Pertinent laboratory and test data reviewed and discussed with patient. See patient instructions also for other medical advice given    Medications Discontinued During This Encounter   Medication Reason    budesonide-formoterol (SYMBICORT) 160-4.5 MCG/ACT AERO DISCONTINUED BY ANOTHER CLINICIAN    ibuprofen (ADVIL;MOTRIN) 400 MG tablet Therapy completed       Follow-up and Dispositions    Return in about 3 months (around 5/28/2023), or if symptoms worsen or fail to improve, for post test.           Return to ER if any significant CP not relieved by s/l NTG, severe SOB, severe palpitations, loss of consciousness    2/28/2023 symptoms of chest pain and dyspnea are concerning and new. Along with new EKG changes showing QS pattern in V1 2 and 3, check a stress test for any ischemia and LV function.   Blood pressure is normal without any medications. We will follow the MR for clinically for now. Healthy diet discussed and Mediterranean diet guidelines printed.

## 2023-03-06 ENCOUNTER — HOSPITAL ENCOUNTER (OUTPATIENT)
Age: 68
Discharge: HOME | End: 2023-03-09
Payer: MEDICARE

## 2023-03-06 DIAGNOSIS — D32.9 BENIGN NEOPLASM OF MENINGES (HCC): ICD-10-CM

## 2023-03-06 DIAGNOSIS — D32.9 MENINGIOMA (HCC): ICD-10-CM

## 2023-03-06 LAB — CREAT SERPL-MCNC: 0.59 MG/DL (ref 0.6–1.3)

## 2023-03-06 PROCEDURE — A9579 GAD-BASE MR CONTRAST NOS,1ML: HCPCS | Performed by: NEUROLOGICAL SURGERY

## 2023-03-06 PROCEDURE — 6360000004 HC RX CONTRAST MEDICATION: Performed by: NEUROLOGICAL SURGERY

## 2023-03-06 PROCEDURE — 36415 COLL VENOUS BLD VENIPUNCTURE: CPT

## 2023-03-06 PROCEDURE — 82565 ASSAY OF CREATININE: CPT

## 2023-03-06 PROCEDURE — 70553 MRI BRAIN STEM W/O & W/DYE: CPT

## 2023-03-06 RX ADMIN — GADOTERIDOL 15 ML: 279.3 INJECTION, SOLUTION INTRAVENOUS at 12:10

## 2023-03-16 ENCOUNTER — TELEPHONE (OUTPATIENT)
Age: 68
End: 2023-03-16

## 2023-03-17 ENCOUNTER — TELEPHONE (OUTPATIENT)
Age: 68
End: 2023-03-17

## 2023-03-17 NOTE — TELEPHONE ENCOUNTER
Spoke with patient regarding concern from nuc stress test. She states she was seen RA doctor. She voices understanding and acceptance of this advice and will call back if any further questions or concerns.

## 2023-03-17 NOTE — TELEPHONE ENCOUNTER
Patient called and stated that she had a nuclear stress test on Monday and they out the medicine in her .she stated that her chest has been hurting on the right side and her stomach,and back has been burning she wants to know if the medication from the nuclear stress test has anything to do with it.

## 2023-03-23 ENCOUNTER — TELEPHONE (OUTPATIENT)
Age: 68
End: 2023-03-23

## 2023-03-23 NOTE — TELEPHONE ENCOUNTER
Spoke with patient per Joana Watkins NP. Nuclear stress test reviewed. Test is normal. She voices understanding and acceptance of this advice and will call back if any further questions or concerns.

## 2023-05-02 ENCOUNTER — HOSPITAL ENCOUNTER (OUTPATIENT)
Age: 68
Discharge: HOME | End: 2023-05-05
Payer: MEDICARE

## 2023-05-02 DIAGNOSIS — I34.0 NON-RHEUMATIC MITRAL REGURGITATION: ICD-10-CM

## 2023-05-02 DIAGNOSIS — E78.00 PURE HYPERCHOLESTEROLEMIA, UNSPECIFIED: ICD-10-CM

## 2023-05-02 DIAGNOSIS — R07.9 CHEST PAIN, UNSPECIFIED TYPE: ICD-10-CM

## 2023-05-02 LAB
ALBUMIN SERPL-MCNC: 3.9 G/DL (ref 3.4–5)
ALBUMIN/GLOB SERPL: 1.1 (ref 0.8–1.7)
ALP SERPL-CCNC: 64 U/L (ref 45–117)
ALT SERPL-CCNC: 23 U/L (ref 13–56)
ANION GAP SERPL CALC-SCNC: 3 MMOL/L (ref 3–18)
AST SERPL W P-5'-P-CCNC: 16 U/L (ref 10–38)
BILIRUB DIRECT SERPL-MCNC: 0.2 MG/DL (ref 0–0.2)
BILIRUB SERPL-MCNC: 0.9 MG/DL (ref 0.2–1)
BUN SERPL-MCNC: 21 MG/DL (ref 7–18)
BUN/CREAT SERPL: 32 (ref 12–20)
CA-I BLD-MCNC: 8.7 MG/DL (ref 8.5–10.1)
CHLORIDE SERPL-SCNC: 111 MMOL/L (ref 100–111)
CHOLEST SERPL-MCNC: 112 MG/DL
CO2 SERPL-SCNC: 27 MMOL/L (ref 21–32)
CREAT SERPL-MCNC: 0.65 MG/DL (ref 0.6–1.3)
GLOBULIN SER CALC-MCNC: 3.4 G/DL (ref 2–4)
GLUCOSE SERPL-MCNC: 98 MG/DL (ref 74–99)
HDLC SERPL-MCNC: 36 MG/DL (ref 40–60)
HDLC SERPL: 3.1 (ref 0–5)
LDLC SERPL CALC-MCNC: 58.4 MG/DL (ref 0–100)
LIPID PANEL: ABNORMAL
POTASSIUM SERPL-SCNC: 3.8 MMOL/L (ref 3.5–5.5)
PROT SERPL-MCNC: 7.3 G/DL (ref 6.4–8.2)
SODIUM SERPL-SCNC: 141 MMOL/L (ref 136–145)
TRIGL SERPL-MCNC: 88 MG/DL
VLDLC SERPL CALC-MCNC: 17.6 MG/DL

## 2023-05-02 PROCEDURE — 80076 HEPATIC FUNCTION PANEL: CPT

## 2023-05-02 PROCEDURE — 36415 COLL VENOUS BLD VENIPUNCTURE: CPT

## 2023-05-02 PROCEDURE — 80048 BASIC METABOLIC PNL TOTAL CA: CPT

## 2023-05-02 PROCEDURE — 80061 LIPID PANEL: CPT

## 2023-05-09 ENCOUNTER — OFFICE VISIT (OUTPATIENT)
Age: 68
End: 2023-05-09
Payer: MEDICARE

## 2023-05-09 VITALS
OXYGEN SATURATION: 97 % | SYSTOLIC BLOOD PRESSURE: 115 MMHG | BODY MASS INDEX: 27.31 KG/M2 | HEIGHT: 64 IN | WEIGHT: 160 LBS | HEART RATE: 65 BPM | DIASTOLIC BLOOD PRESSURE: 65 MMHG

## 2023-05-09 DIAGNOSIS — I34.0 NON-RHEUMATIC MITRAL REGURGITATION: ICD-10-CM

## 2023-05-09 DIAGNOSIS — R07.2 PRECORDIAL PAIN: Primary | ICD-10-CM

## 2023-05-09 DIAGNOSIS — E78.00 HYPERCHOLESTEREMIA: ICD-10-CM

## 2023-05-09 PROCEDURE — 99213 OFFICE O/P EST LOW 20 MIN: CPT | Performed by: INTERNAL MEDICINE

## 2023-05-09 PROCEDURE — 1123F ACP DISCUSS/DSCN MKR DOCD: CPT | Performed by: INTERNAL MEDICINE

## 2023-05-09 RX ORDER — CETIRIZINE HYDROCHLORIDE 10 MG/1
10 TABLET ORAL DAILY
COMMUNITY

## 2023-05-09 ASSESSMENT — PATIENT HEALTH QUESTIONNAIRE - PHQ9
SUM OF ALL RESPONSES TO PHQ QUESTIONS 1-9: 0
SUM OF ALL RESPONSES TO PHQ9 QUESTIONS 1 & 2: 0
SUM OF ALL RESPONSES TO PHQ QUESTIONS 1-9: 0
1. LITTLE INTEREST OR PLEASURE IN DOING THINGS: 0
2. FEELING DOWN, DEPRESSED OR HOPELESS: 0
SUM OF ALL RESPONSES TO PHQ QUESTIONS 1-9: 0
SUM OF ALL RESPONSES TO PHQ QUESTIONS 1-9: 0

## 2023-05-09 ASSESSMENT — ENCOUNTER SYMPTOMS
EYES NEGATIVE: 1
SHORTNESS OF BREATH: 1
GASTROINTESTINAL NEGATIVE: 1

## 2023-05-09 NOTE — PROGRESS NOTES
1. Have you been to the ER, urgent care clinic since your last visit? Hospitalized since your last visit? No    2. Where do you normally have your labs drawn?   [facility]    3. Do you need any refills today?   no    4. Which local pharmacy do you use (enter pharmacy)? [facility]    5. Which mail order pharmacy do you use (enter pharmacy)?   no     6. Are you here for surgical clearance and if so who will be doing your     procedure/surgery (care team)?    no
cyclobenzaprine (FLEXERIL) 10 MG tablet Take 1 tablet by mouth 3 times daily as needed      dextromethorphan-guaiFENesin (MUCINEX DM)  MG per extended release tablet Take 1 tablet by mouth 2 times daily      estrogens conjugated (PREMARIN) 0.625 MG/GM CREA vaginal cream Place 0.5 g vaginally daily      fluocinonide (LIDEX) 0.05 % ointment Apply topically 2 times daily      gabapentin (NEURONTIN) 600 MG tablet Take 1 tablet by mouth daily. levothyroxine (SYNTHROID) 75 MCG tablet TAKE 1 TABLET BY MOUTH ONCE DAILY FOR 30 DAYS      lidocaine 4 % external patch 1 patch q 12 hours for pain  Indications: Pain      omeprazole (PRILOSEC) 20 MG delayed release capsule Take 2 capsules by mouth daily      ondansetron (ZOFRAN) 4 MG tablet Take 1 tablet by mouth every 8 hours as needed      polyethylene glycol (GLYCOLAX) 17 GM/SCOOP powder Take 17 g by mouth 2 times daily       No current facility-administered medications for this visit. Past Surgical History:   Procedure Laterality Date    APPENDECTOMY      BLADDER SUSPENSION      BREAST BIOPSY Left     Benign done [facility] w/ Dr Johny Bagley (CERVIX STATUS UNKNOWN)      VERONIKA at [facility] w/ Dr Merlinda Spina:    05/09/23 0901   BP: 115/65   Site: Right Upper Arm   Position: Sitting   Pulse: 65   SpO2: 97%   Weight: 160 lb (72.6 kg)   Height: 5' 4\" (1.626 m)          Diagnostic Studies:  I have reviewed the relevant tests done on the patient and show as follows  EKG tracings reviewed by me today. No results found for this or any previous visit (from the past 4464 hour(s)).   Xray Result (most recent):  XR RIBS RIGHT INCLUDE CHEST (MIN 3 VIEWS) 10/10/2022    Narrative  EXAM: XR RIBS RT W PA CXR MIN 3 V    CLINICAL INDICATION/HISTORY: Pain.  -Additional: None    COMPARISON: 7/29/2021    TECHNIQUE: Frontal view of the chest and multiple views of the right

## 2023-05-19 RX ORDER — ATORVASTATIN CALCIUM 10 MG/1
TABLET, FILM COATED ORAL
Qty: 90 TABLET | Refills: 0 | Status: SHIPPED | OUTPATIENT
Start: 2023-05-19

## 2023-05-26 ENCOUNTER — TRANSCRIBE ORDERS (OUTPATIENT)
Facility: HOSPITAL | Age: 68
End: 2023-05-26

## 2023-05-26 DIAGNOSIS — N64.4 MASTODYNIA: Primary | ICD-10-CM

## 2023-06-16 ENCOUNTER — HOSPITAL ENCOUNTER (OUTPATIENT)
Age: 68
Discharge: HOME | End: 2023-06-19
Payer: MEDICARE

## 2023-06-16 DIAGNOSIS — R52 PAIN: ICD-10-CM

## 2023-06-16 DIAGNOSIS — M51.36 LUMBAR DEGENERATIVE DISC DISEASE: ICD-10-CM

## 2023-06-16 DIAGNOSIS — M54.16 LUMBAR RADICULOPATHY: ICD-10-CM

## 2023-06-16 DIAGNOSIS — R25.1 SHAKING: ICD-10-CM

## 2023-06-16 DIAGNOSIS — R53.83 OTHER FATIGUE: ICD-10-CM

## 2023-06-16 PROCEDURE — 72148 MRI LUMBAR SPINE W/O DYE: CPT

## 2023-06-16 PROCEDURE — 73502 X-RAY EXAM HIP UNI 2-3 VIEWS: CPT

## 2023-07-14 ENCOUNTER — HOSPITAL ENCOUNTER (OUTPATIENT)
Facility: HOSPITAL | Age: 68
End: 2023-07-14
Payer: MEDICARE

## 2023-07-14 DIAGNOSIS — N64.4 MASTODYNIA: ICD-10-CM

## 2023-07-14 PROCEDURE — G0279 TOMOSYNTHESIS, MAMMO: HCPCS

## 2023-08-25 RX ORDER — ATORVASTATIN CALCIUM 10 MG/1
TABLET, FILM COATED ORAL
Qty: 90 TABLET | Refills: 3 | Status: SHIPPED | OUTPATIENT
Start: 2023-08-25

## 2023-08-29 NOTE — PROGRESS NOTES
1. Have you been to the ER, urgent care clinic since your last visit? Hospitalized since your last visit? Yes where: [facility]/Chest pain    2. Have you seen or consulted any other health care providers outside of the [address] [place] [address] since your last visit? Include any pap smears or colon screening. Yes where: PCP    3. Since your last visit, have you had any of the following symptoms? chest pains, dizziness and swelling in legs/arms. 4.  Have you had any blood work, X-rays or cardiac testing? Yes Where: [facility] Reason for visit: Labs/EKG        5. Where do you normally have your labs drawn? [facility]/PCP    6. Do you need any refills today?    NO The patient is a 41y Female complaining of abdominal pain.

## 2023-12-01 ENCOUNTER — HOSPITAL ENCOUNTER (OUTPATIENT)
Age: 68
End: 2023-12-01
Payer: MEDICARE

## 2023-12-01 ENCOUNTER — OFFICE VISIT (OUTPATIENT)
Facility: CLINIC | Age: 68
End: 2023-12-01
Payer: MEDICARE

## 2023-12-01 VITALS
WEIGHT: 157.8 LBS | HEIGHT: 64 IN | SYSTOLIC BLOOD PRESSURE: 128 MMHG | RESPIRATION RATE: 18 BRPM | DIASTOLIC BLOOD PRESSURE: 67 MMHG | OXYGEN SATURATION: 96 % | HEART RATE: 73 BPM | TEMPERATURE: 97.7 F | BODY MASS INDEX: 26.94 KG/M2

## 2023-12-01 DIAGNOSIS — N20.0 KIDNEY STONES: ICD-10-CM

## 2023-12-01 DIAGNOSIS — E55.9 VITAMIN D DEFICIENCY: ICD-10-CM

## 2023-12-01 DIAGNOSIS — R35.0 URINARY FREQUENCY: ICD-10-CM

## 2023-12-01 DIAGNOSIS — E78.00 PURE HYPERCHOLESTEROLEMIA, UNSPECIFIED: ICD-10-CM

## 2023-12-01 DIAGNOSIS — I34.0 NON-RHEUMATIC MITRAL REGURGITATION: ICD-10-CM

## 2023-12-01 DIAGNOSIS — R31.9 HEMATURIA, UNSPECIFIED TYPE: ICD-10-CM

## 2023-12-01 DIAGNOSIS — G47.00 INSOMNIA, UNSPECIFIED TYPE: ICD-10-CM

## 2023-12-01 DIAGNOSIS — M54.9 SUBACUTE BACK PAIN: Primary | ICD-10-CM

## 2023-12-01 DIAGNOSIS — M54.9 SUBACUTE BACK PAIN: ICD-10-CM

## 2023-12-01 DIAGNOSIS — D33.2 BENIGN NEOPLASM OF BRAIN, UNSPECIFIED BRAIN REGION (HCC): ICD-10-CM

## 2023-12-01 DIAGNOSIS — M85.80 OSTEOPENIA, UNSPECIFIED LOCATION: ICD-10-CM

## 2023-12-01 DIAGNOSIS — Z23 NEED FOR PNEUMOCOCCAL VACCINE: ICD-10-CM

## 2023-12-01 DIAGNOSIS — R91.8 PULMONARY NODULES: ICD-10-CM

## 2023-12-01 LAB
25(OH)D3 SERPL-MCNC: 39.8 NG/ML (ref 30–100)
ALBUMIN SERPL-MCNC: 3.9 G/DL (ref 3.4–5)
ALBUMIN/GLOB SERPL: 1.2 (ref 0.8–1.7)
ALP SERPL-CCNC: 69 U/L (ref 45–117)
ALT SERPL-CCNC: 24 U/L (ref 13–56)
ANION GAP SERPL CALC-SCNC: 6 MMOL/L (ref 3–18)
AST SERPL W P-5'-P-CCNC: 18 U/L (ref 10–38)
BASOPHILS # BLD: 0 K/UL (ref 0–0.1)
BASOPHILS NFR BLD: 1 % (ref 0–2)
BILIRUB SERPL-MCNC: 0.9 MG/DL (ref 0.2–1)
BILIRUBIN, URINE, POC: NEGATIVE
BLOOD URINE, POC: NORMAL
BUN SERPL-MCNC: 21 MG/DL (ref 7–18)
BUN/CREAT SERPL: 32 (ref 12–20)
CA-I BLD-MCNC: 8.9 MG/DL (ref 8.5–10.1)
CHLORIDE SERPL-SCNC: 110 MMOL/L (ref 100–111)
CHOLEST SERPL-MCNC: 152 MG/DL
CO2 SERPL-SCNC: 28 MMOL/L (ref 21–32)
CREAT SERPL-MCNC: 0.66 MG/DL (ref 0.6–1.3)
DIFFERENTIAL METHOD BLD: ABNORMAL
EOSINOPHIL # BLD: 0.2 K/UL (ref 0–0.4)
EOSINOPHIL NFR BLD: 4 % (ref 0–5)
ERYTHROCYTE [DISTWIDTH] IN BLOOD BY AUTOMATED COUNT: 11.8 % (ref 11.6–14.5)
GLOBULIN SER CALC-MCNC: 3.2 G/DL (ref 2–4)
GLUCOSE SERPL-MCNC: 92 MG/DL (ref 74–99)
GLUCOSE URINE, POC: NEGATIVE
HCT VFR BLD AUTO: 38.9 % (ref 35–45)
HDLC SERPL-MCNC: 43 MG/DL (ref 40–60)
HDLC SERPL: 3.5 (ref 0–5)
HGB BLD-MCNC: 13.2 G/DL (ref 12–16)
IMM GRANULOCYTES # BLD AUTO: 0 K/UL (ref 0–0.04)
IMM GRANULOCYTES NFR BLD AUTO: 0 % (ref 0–0.5)
KETONES, URINE, POC: NEGATIVE
LDLC SERPL CALC-MCNC: 100.4 MG/DL (ref 0–100)
LEUKOCYTE ESTERASE, URINE, POC: NEGATIVE
LIPID PANEL: ABNORMAL
LYMPHOCYTES # BLD: 2.3 K/UL (ref 0.9–3.6)
LYMPHOCYTES NFR BLD: 36 % (ref 21–52)
MCH RBC QN AUTO: 32 PG (ref 24–34)
MCHC RBC AUTO-ENTMCNC: 33.9 G/DL (ref 31–37)
MCV RBC AUTO: 94.2 FL (ref 78–100)
MONOCYTES # BLD: 0.4 K/UL (ref 0.05–1.2)
MONOCYTES NFR BLD: 6 % (ref 3–10)
NEUTS SEG # BLD: 3.4 K/UL (ref 1.8–8)
NEUTS SEG NFR BLD: 53 % (ref 40–73)
NITRITE, URINE, POC: NEGATIVE
NRBC # BLD: 0 K/UL (ref 0–0.01)
NRBC BLD-RTO: 0 PER 100 WBC
PH, URINE, POC: 7 (ref 4.6–8)
PLATELET # BLD AUTO: 252 K/UL (ref 135–420)
PMV BLD AUTO: 9.6 FL (ref 9.2–11.8)
POTASSIUM SERPL-SCNC: 3.8 MMOL/L (ref 3.5–5.5)
PROT SERPL-MCNC: 7.1 G/DL (ref 6.4–8.2)
PROTEIN,URINE, POC: NEGATIVE
RBC # BLD AUTO: 4.13 M/UL (ref 4.2–5.3)
SODIUM SERPL-SCNC: 144 MMOL/L (ref 136–145)
SPECIFIC GRAVITY, URINE, POC: 1.02 (ref 1–1.03)
TRIGL SERPL-MCNC: 43 MG/DL
TSH SERPL DL<=0.05 MIU/L-ACNC: 1.21 UIU/ML (ref 0.36–3.74)
URINALYSIS CLARITY, POC: CLEAR
URINALYSIS COLOR, POC: YELLOW
UROBILINOGEN, POC: NORMAL
VLDLC SERPL CALC-MCNC: 8.6 MG/DL
WBC # BLD AUTO: 6.3 K/UL (ref 4.6–13.2)

## 2023-12-01 PROCEDURE — 81003 URINALYSIS AUTO W/O SCOPE: CPT | Performed by: FAMILY MEDICINE

## 2023-12-01 PROCEDURE — 99205 OFFICE O/P NEW HI 60 MIN: CPT | Performed by: FAMILY MEDICINE

## 2023-12-01 PROCEDURE — 1123F ACP DISCUSS/DSCN MKR DOCD: CPT | Performed by: FAMILY MEDICINE

## 2023-12-01 PROCEDURE — G0009 ADMIN PNEUMOCOCCAL VACCINE: HCPCS | Performed by: FAMILY MEDICINE

## 2023-12-01 PROCEDURE — 84443 ASSAY THYROID STIM HORMONE: CPT

## 2023-12-01 PROCEDURE — 80053 COMPREHEN METABOLIC PANEL: CPT

## 2023-12-01 PROCEDURE — 90677 PCV20 VACCINE IM: CPT | Performed by: FAMILY MEDICINE

## 2023-12-01 PROCEDURE — 36415 COLL VENOUS BLD VENIPUNCTURE: CPT

## 2023-12-01 PROCEDURE — 82306 VITAMIN D 25 HYDROXY: CPT

## 2023-12-01 PROCEDURE — 87086 URINE CULTURE/COLONY COUNT: CPT

## 2023-12-01 PROCEDURE — 85025 COMPLETE CBC W/AUTO DIFF WBC: CPT

## 2023-12-01 PROCEDURE — 72100 X-RAY EXAM L-S SPINE 2/3 VWS: CPT

## 2023-12-01 PROCEDURE — 72070 X-RAY EXAM THORAC SPINE 2VWS: CPT

## 2023-12-01 PROCEDURE — 80061 LIPID PANEL: CPT

## 2023-12-01 RX ORDER — MELOXICAM 15 MG/1
15 TABLET ORAL
COMMUNITY
Start: 2023-10-04

## 2023-12-01 RX ORDER — CYCLOBENZAPRINE HCL 5 MG
2.5 TABLET ORAL DAILY PRN
Qty: 15 TABLET | Refills: 0 | Status: SHIPPED | OUTPATIENT
Start: 2023-12-01 | End: 2023-12-31

## 2023-12-01 SDOH — ECONOMIC STABILITY: FOOD INSECURITY: WITHIN THE PAST 12 MONTHS, THE FOOD YOU BOUGHT JUST DIDN'T LAST AND YOU DIDN'T HAVE MONEY TO GET MORE.: NEVER TRUE

## 2023-12-01 SDOH — ECONOMIC STABILITY: FOOD INSECURITY: WITHIN THE PAST 12 MONTHS, YOU WORRIED THAT YOUR FOOD WOULD RUN OUT BEFORE YOU GOT MONEY TO BUY MORE.: NEVER TRUE

## 2023-12-01 SDOH — ECONOMIC STABILITY: INCOME INSECURITY: HOW HARD IS IT FOR YOU TO PAY FOR THE VERY BASICS LIKE FOOD, HOUSING, MEDICAL CARE, AND HEATING?: NOT HARD AT ALL

## 2023-12-01 SDOH — ECONOMIC STABILITY: HOUSING INSECURITY
IN THE LAST 12 MONTHS, WAS THERE A TIME WHEN YOU DID NOT HAVE A STEADY PLACE TO SLEEP OR SLEPT IN A SHELTER (INCLUDING NOW)?: NO

## 2023-12-01 ASSESSMENT — PATIENT HEALTH QUESTIONNAIRE - PHQ9
SUM OF ALL RESPONSES TO PHQ QUESTIONS 1-9: 0
1. LITTLE INTEREST OR PLEASURE IN DOING THINGS: 0
SUM OF ALL RESPONSES TO PHQ QUESTIONS 1-9: 0
SUM OF ALL RESPONSES TO PHQ9 QUESTIONS 1 & 2: 0
SUM OF ALL RESPONSES TO PHQ QUESTIONS 1-9: 0
SUM OF ALL RESPONSES TO PHQ QUESTIONS 1-9: 0
2. FEELING DOWN, DEPRESSED OR HOPELESS: 0

## 2023-12-01 ASSESSMENT — ENCOUNTER SYMPTOMS
COUGH: 1
DIARRHEA: 0
CONSTIPATION: 0
BACK PAIN: 1
NAUSEA: 0
BLOOD IN STOOL: 0
VOMITING: 0
ABDOMINAL PAIN: 1
SHORTNESS OF BREATH: 0

## 2023-12-01 NOTE — PROGRESS NOTES
Indu Castro (: 1955) is a 70 y.o. female here for evaluation of the following chief concern(s):  Chronic condition management   Establishment of care      ASSESSMENT/PLAN:  1. Subacute back pain  -     XR LUMBAR SPINE (2-3 VIEWS); Future  -     cyclobenzaprine (FLEXERIL) 5 MG tablet; Take 0.5 tablets by mouth daily as needed for Muscle spasms, Disp-15 tablet, R-0Normal  -     diclofenac sodium (VOLTAREN) 1 % GEL; Apply 2 g topically 3 times daily as needed for Pain, Topical, 3 TIMES DAILY PRN Starting 2023, Disp-100 g, R-0, Normal  2. Urinary frequency  -     AMB POC URINALYSIS DIP STICK AUTO W/O MICRO  -     Culture, Urine; Future  -     US RETROPERITONEAL COMPLETE; Future  3. Hematuria, unspecified type  -     US RETROPERITONEAL COMPLETE; Future  4. Kidney stones  -     CBC with Auto Differential; Future  -     Comprehensive Metabolic Panel; Future  -     AMB POC URINALYSIS DIP STICK AUTO W/O MICRO  -     Culture, Urine; Future  -     US RETROPERITONEAL COMPLETE; Future  5. Benign neoplasm of brain, unspecified brain region ([address])  6. Non-rheumatic mitral regurgitation  7. Pure hypercholesterolemia, unspecified  -     Lipid Panel; Future  8. Pulmonary nodules  -     [facility] - Tony Jefferson DO, Pulmonology, [facility])  9. Insomnia, unspecified type  -     TSH; Future  10. Osteopenia, unspecified location  -     Vitamin D 25 Hydroxy; Future  11. Vitamin D deficiency  -     Vitamin D 25 Hydroxy; Future  12. Need for pneumococcal vaccine  -     Pneumococcal, PCV20, PREVNAR 20, (age 6w+), IM, PF    Ms. Castro appears medically stable, normal hemodynamics including blood pressure. Back pain sound like muscle strain- will check XR imaging given hx of osteopenia and kidney stones. Will also check Ucx and retroperitoneal U/S for further evaluation given hx of obstructive uropathy. Refer to Pulmonology to risk stratify w/ hx of pulmonology nodules- lost to follow-up.       Standard

## 2023-12-02 LAB
BACTERIA SPEC CULT: NORMAL
BACTERIA SPEC CULT: NORMAL
Lab: NORMAL
Lab: NORMAL

## 2023-12-13 ENCOUNTER — OFFICE VISIT (OUTPATIENT)
Facility: CLINIC | Age: 68
End: 2023-12-13
Payer: MEDICARE

## 2023-12-13 ENCOUNTER — HOSPITAL ENCOUNTER (OUTPATIENT)
Age: 68
Setting detail: SPECIMEN
Discharge: HOME | End: 2023-12-16
Attending: FAMILY MEDICINE
Payer: MEDICARE

## 2023-12-13 VITALS
TEMPERATURE: 98.6 F | BODY MASS INDEX: 26.8 KG/M2 | SYSTOLIC BLOOD PRESSURE: 107 MMHG | OXYGEN SATURATION: 96 % | HEART RATE: 69 BPM | RESPIRATION RATE: 18 BRPM | WEIGHT: 157 LBS | HEIGHT: 64 IN | DIASTOLIC BLOOD PRESSURE: 58 MMHG

## 2023-12-13 DIAGNOSIS — M54.9 SUBACUTE BACK PAIN: Primary | ICD-10-CM

## 2023-12-13 DIAGNOSIS — R21 RASH: ICD-10-CM

## 2023-12-13 DIAGNOSIS — R31.29 MICROSCOPIC HEMATURIA: ICD-10-CM

## 2023-12-13 DIAGNOSIS — R91.8 PULMONARY NODULES: ICD-10-CM

## 2023-12-13 DIAGNOSIS — N20.0 KIDNEY STONES: ICD-10-CM

## 2023-12-13 LAB
APPEARANCE UR: CLEAR
BACTERIA URNS QL MICRO: ABNORMAL /HPF
BILIRUB UR QL: NEGATIVE
COLOR UR: YELLOW
EPITH CASTS URNS QL MICRO: ABNORMAL /LPF (ref 0–20)
GLUCOSE UR STRIP.AUTO-MCNC: NEGATIVE MG/DL
HGB UR QL STRIP: NEGATIVE
KETONES UR QL STRIP.AUTO: NEGATIVE MG/DL
LEUKOCYTE ESTERASE UR QL STRIP.AUTO: NEGATIVE
NITRITE UR QL STRIP.AUTO: NEGATIVE
PH UR STRIP: 7 (ref 5–8)
PROT UR STRIP-MCNC: NEGATIVE MG/DL
RBC #/AREA URNS HPF: ABNORMAL /HPF (ref 0–2)
SP GR UR REFRACTOMETRY: <1.005 (ref 1–1.03)
UROBILINOGEN UR QL STRIP.AUTO: 0.2 EU/DL (ref 0.2–1)
WBC URNS QL MICRO: ABNORMAL /HPF (ref 0–4)

## 2023-12-13 PROCEDURE — 1123F ACP DISCUSS/DSCN MKR DOCD: CPT | Performed by: FAMILY MEDICINE

## 2023-12-13 PROCEDURE — 99214 OFFICE O/P EST MOD 30 MIN: CPT | Performed by: FAMILY MEDICINE

## 2023-12-13 PROCEDURE — 81001 URINALYSIS AUTO W/SCOPE: CPT

## 2023-12-13 ASSESSMENT — PATIENT HEALTH QUESTIONNAIRE - PHQ9
SUM OF ALL RESPONSES TO PHQ9 QUESTIONS 1 & 2: 0
2. FEELING DOWN, DEPRESSED OR HOPELESS: 0
SUM OF ALL RESPONSES TO PHQ QUESTIONS 1-9: 0
SUM OF ALL RESPONSES TO PHQ QUESTIONS 1-9: 0
1. LITTLE INTEREST OR PLEASURE IN DOING THINGS: 0
SUM OF ALL RESPONSES TO PHQ QUESTIONS 1-9: 0
SUM OF ALL RESPONSES TO PHQ QUESTIONS 1-9: 0

## 2023-12-13 NOTE — PROGRESS NOTES
Indu Castro (: 1955) is a 70 y.o. female here for evaluation of the following chief concern(s):  Chronic condition management     ASSESSMENT/PLAN:  1. Subacute back pain  -     diclofenac sodium (VOLTAREN) 1 % GEL; Apply 2 g topically 3 times daily as needed for Pain, Topical, 3 TIMES DAILY PRN Starting Wed 2023, Disp-100 g, R-0, Print  -     Voncille Hashimoto, MD, Orthopedic Surgery(General/Total Joint), [facility] )  2. Kidney stones  3. Microscopic hematuria  -     Urinalysis with Microscopic; Future  4. Rash  5. Pulmonary nodules    Ms. Castro appears medically stable, normal hemodynamics including blood pressure. She plans to move up her Dermatology appt- currently scheduled for [facility] . Plan to check mouth next visit- irritated patch on roof of mouth pt related to harvest/seasonal allergies. Back pain sound like muscle strain- XR imaging not suggestive of fracture/stone. Pending retroperitoneal U/S for further evaluation given hx of obstructive uropathy. Send-out UA for trace blood on POC test.  Refer to Ortho per pt request for further evaluation. Pending Pulmonology consultation for hx nodules lost to follow-up; appreciate shared care. Standard labs to further evaluate stable chronic conditions as above. Return in about 4 weeks (around 1/10/2024) for follow-up chronic conditions or sooner if needed. Kellen West agrees with plan as above and has no additional questions at this time. SUBJECTIVE/OBJECTIVE:    23 results review;  RBC 4.13 stable, H&H/MCV WNL  CBC WNL  CMP WNL- BUN 21; pt states may have been dehydrated  Lipid; LDL, HDL  TSH WNL  Vit D WNL    UA- trace blood  Ucx no growth    23 XR thoracic and lumbar: non-acute; Anterolisthesis of L4 on L5, levoscoliosis, status post cholecystectomy. Outstanding order: renal U/S. Scheduled for Pulmonology consult 24. Overall, pt is doing \"good\".        Acute on chronic

## 2023-12-13 NOTE — PROGRESS NOTES
Chief Complaint   Patient presents with    Follow-up     1 week   Patient wanted to speak with provider prior to scheduling US. States that appt with Dr Chema Maria is in February. Complaint today of roof of mouth is sore. 1. \"Have you been to the ER, urgent care clinic since your last visit? Hospitalized since your last visit? \" No    2. \"Have you seen or consulted any other health care providers outside of the [address] [place] [address] since your last visit? \" No     3. For patients aged 43-73: Has the patient had a colonoscopy / FIT/ Cologuard? No      If the patient is female:    4. For patients aged 43-66: Has the patient had a mammogram within the past 2 years? Yes - no [place] present FIT test kit given to patient in office today. 5. For patients aged 21-65: Has the patient had a pap smear?  NA - based on age or sex

## 2023-12-22 ENCOUNTER — HOSPITAL ENCOUNTER (OUTPATIENT)
Facility: HOSPITAL | Age: 68
Discharge: HOME | End: 2023-12-25
Attending: FAMILY MEDICINE
Payer: MEDICARE

## 2023-12-22 DIAGNOSIS — N20.0 KIDNEY STONES: ICD-10-CM

## 2023-12-22 DIAGNOSIS — R35.0 URINARY FREQUENCY: ICD-10-CM

## 2023-12-22 DIAGNOSIS — R31.9 HEMATURIA, UNSPECIFIED TYPE: ICD-10-CM

## 2023-12-22 PROCEDURE — 76770 US EXAM ABDO BACK WALL COMP: CPT

## 2023-12-28 ENCOUNTER — TELEPHONE (OUTPATIENT)
Facility: CLINIC | Age: 68
End: 2023-12-28

## 2023-12-28 NOTE — TELEPHONE ENCOUNTER
----- Message from Jet Sims sent at 12/28/2023  2:11 PM EST -----  Subject: Results Request    QUESTIONS  Results: Ultrasound (Kidney and Bladder) ; Ordered by:   Date Performed: 2023-12-22  ---------------------------------------------------------------------------  --------------  CALL BACK INFO    [phone]; OK to leave message on voicemail  ---------------------------------------------------------------------------  --------------

## 2023-12-28 NOTE — TELEPHONE ENCOUNTER
Tried to call patient and went straight to . Results not reviewed at this time, PCP will be back in office on Monday and we will call patient. Forwarded to PCP to advise on US results.

## 2024-01-03 ENCOUNTER — TELEPHONE (OUTPATIENT)
Facility: CLINIC | Age: 69
End: 2024-01-03

## 2024-01-03 NOTE — TELEPHONE ENCOUNTER
Pt returned your call. Thinks it might be in regards to her US results. Please call on her cell # [phone]

## 2024-01-08 ENCOUNTER — TELEMEDICINE (OUTPATIENT)
Facility: CLINIC | Age: 69
End: 2024-01-08
Payer: MEDICARE

## 2024-01-08 DIAGNOSIS — U07.1 COVID: Primary | ICD-10-CM

## 2024-01-08 PROCEDURE — 99422 OL DIG E/M SVC 11-20 MIN: CPT | Performed by: FAMILY MEDICINE

## 2024-01-08 ASSESSMENT — PATIENT HEALTH QUESTIONNAIRE - PHQ9
SUM OF ALL RESPONSES TO PHQ QUESTIONS 1-9: 0
SUM OF ALL RESPONSES TO PHQ9 QUESTIONS 1 & 2: 0
SUM OF ALL RESPONSES TO PHQ QUESTIONS 1-9: 0
SUM OF ALL RESPONSES TO PHQ QUESTIONS 1-9: 0
2. FEELING DOWN, DEPRESSED OR HOPELESS: 0
1. LITTLE INTEREST OR PLEASURE IN DOING THINGS: 0
SUM OF ALL RESPONSES TO PHQ QUESTIONS 1-9: 0

## 2024-01-08 NOTE — PROGRESS NOTES
Indu Castro (: 1955) is a 68 y.o. female here for evaluation of the following chief concern(s):  Chronic condition management     The services today are being conducted using telemedicine which Indu Castro has consented to at the time of scheduling.      ASSESSMENT/PLAN:  1. COVID    Mrs. Castro sounds medically stable w/ moderately symptomatic COVID, improving.  Ddx influenza.      Defers Paxlovid.  Pt advised use Vit C w/ caution given hx nephrolithiasis.  She defers tessalon and albuterol at this time.  We reviewed isolation and ER precautions.       Move up follow-up chronic condition visit to ~1 week;  23 retroperitoneal U/S:  IMPRESSION:  Benign appearing left renal cyst. The study is otherwise normal with no renal  stones and no hydronephrosis.      Indu Castro agrees with plan as above and has no additional questions at this time.       SUBJECTIVE/OBJECTIVE:    Symptoms started ~Thursday; fevers- resolved, chills, headache, congestion/cough- yellow w/ blood tinge- resolved, dyspnea w/ exertion, poor appetite but staying hydrated, malaise.  Positive test on Friday.  Today she is \"a tad batter\".    She is using Robitussin, Dayquil.  No hx COPD, possibly mild asthma but does not require an inhaler.  NKDA.     Past Medical History:   Diagnosis Date    Arthritis     Brain tumor (benign) (HCC)     Endocrine disorder     Endometriosis     Hematuria     Hydronephrosis     Hypercholesteremia 10/17/2019    Kidney stones     Menopause     Osteoporosis     Pulmonary nodules     Thyroid disease     UTI (urinary tract infection)      Past Surgical History:   Procedure Laterality Date    APPENDECTOMY      BLADDER SUSPENSION      BREAST BIOPSY Left     Benign done [facility] w/ Dr NORRIS Newby    BREAST LUMPECTOMY      CHOLECYSTECTOMY      HYSTERECTOMY (CERVIX STATUS UNKNOWN)      for endometriosis, VERONIK[facility] at [facility] w/ Dr LILIANE Reyes    OVARY REMOVAL       Family History   Problem Relation Age of Onset    Other Mother

## 2024-01-08 NOTE — PROGRESS NOTES
Sick since Friday, chills, fever x 3 days, body aches. Positive for Covid on Friday. She wants to know if anything she can do or take to help. Complaining of bad cough, shortness of breath and no energy.    Chief Complaint   Patient presents with    Post-COVID Symptoms     Tested positive on Friday 1/5/2024    Telephone Appointment Visit     1. \"Have you been to the ER, urgent care clinic since your last visit?  Hospitalized since your last visit?\" No    2. \"Have you seen or consulted any other health care providers outside of the [facility] since your last visit?\" No     3. For patients aged 45-75: Has the patient had a colonoscopy / FIT/ Cologuard? Yes  If the patient is female:    4. For patients aged 40-74: Has the patient had a mammogram within the past 2 years? Yes    5. For patients aged 21-65: Has the patient had a pap smear? NA - based on age or sex

## 2024-01-24 ENCOUNTER — HOSPITAL ENCOUNTER (OUTPATIENT)
Age: 69
Setting detail: SPECIMEN
Discharge: HOME | End: 2024-01-27
Payer: MEDICARE

## 2024-01-24 ENCOUNTER — OFFICE VISIT (OUTPATIENT)
Facility: CLINIC | Age: 69
End: 2024-01-24
Payer: MEDICARE

## 2024-01-24 VITALS
OXYGEN SATURATION: 96 % | SYSTOLIC BLOOD PRESSURE: 129 MMHG | HEART RATE: 69 BPM | BODY MASS INDEX: 26.98 KG/M2 | DIASTOLIC BLOOD PRESSURE: 62 MMHG | RESPIRATION RATE: 18 BRPM | HEIGHT: 64 IN | WEIGHT: 158 LBS | TEMPERATURE: 98.4 F

## 2024-01-24 DIAGNOSIS — M79.671 PAIN OF BOTH HEELS: ICD-10-CM

## 2024-01-24 DIAGNOSIS — R51.9 CHRONIC INTRACTABLE HEADACHE, UNSPECIFIED HEADACHE TYPE: Primary | ICD-10-CM

## 2024-01-24 DIAGNOSIS — E03.9 HYPOTHYROIDISM, UNSPECIFIED TYPE: ICD-10-CM

## 2024-01-24 DIAGNOSIS — M79.672 PAIN OF BOTH HEELS: ICD-10-CM

## 2024-01-24 DIAGNOSIS — R05.2 SUBACUTE COUGH: ICD-10-CM

## 2024-01-24 DIAGNOSIS — G89.29 CHRONIC INTRACTABLE HEADACHE, UNSPECIFIED HEADACHE TYPE: Primary | ICD-10-CM

## 2024-01-24 DIAGNOSIS — M19.90 ARTHRITIS: ICD-10-CM

## 2024-01-24 DIAGNOSIS — E55.9 VITAMIN D DEFICIENCY: ICD-10-CM

## 2024-01-24 DIAGNOSIS — D33.2 BENIGN NEOPLASM OF BRAIN, UNSPECIFIED BRAIN REGION (HCC): ICD-10-CM

## 2024-01-24 LAB — ERYTHROCYTE [SEDIMENTATION RATE] IN BLOOD: 42 MM/HR (ref 0–30)

## 2024-01-24 PROCEDURE — 85652 RBC SED RATE AUTOMATED: CPT

## 2024-01-24 PROCEDURE — 36415 COLL VENOUS BLD VENIPUNCTURE: CPT | Performed by: FAMILY MEDICINE

## 2024-01-24 PROCEDURE — 99215 OFFICE O/P EST HI 40 MIN: CPT | Performed by: FAMILY MEDICINE

## 2024-01-24 PROCEDURE — 86140 C-REACTIVE PROTEIN: CPT

## 2024-01-24 PROCEDURE — 1123F ACP DISCUSS/DSCN MKR DOCD: CPT | Performed by: FAMILY MEDICINE

## 2024-01-24 RX ORDER — LEVOTHYROXINE SODIUM 0.07 MG/1
TABLET ORAL
Qty: 90 TABLET | Refills: 1 | Status: SHIPPED | OUTPATIENT
Start: 2024-01-24

## 2024-01-24 RX ORDER — GUAIFENESIN 600 MG/1
600 TABLET, EXTENDED RELEASE ORAL 2 TIMES DAILY PRN
Qty: 30 TABLET | Refills: 0 | Status: SHIPPED | OUTPATIENT
Start: 2024-01-24 | End: 2024-02-08

## 2024-01-24 ASSESSMENT — PATIENT HEALTH QUESTIONNAIRE - PHQ9
1. LITTLE INTEREST OR PLEASURE IN DOING THINGS: 0
2. FEELING DOWN, DEPRESSED OR HOPELESS: 0
SUM OF ALL RESPONSES TO PHQ QUESTIONS 1-9: 0
SUM OF ALL RESPONSES TO PHQ9 QUESTIONS 1 & 2: 0
SUM OF ALL RESPONSES TO PHQ QUESTIONS 1-9: 0

## 2024-01-24 NOTE — PROGRESS NOTES
Headaches and continues to have cough, post covid.    Chief Complaint   Patient presents with    Follow-up     4 week chronic disease       \"Have you been to the ER, urgent care clinic since your last visit?  Hospitalized since your last visit?\"    NO    “Have you seen or consulted any other health care providers outside of [facility] since your last visit?”    NO          Verbal order from Temi Lincoln MD to order labs/sign and draw them in office    Labs were drawn and sent to [place] by Sujata Blanchard LPN:    The following tubes were sent:    1 Lavendar, 0 Red, 1 SST, 0 Urine    Draw site right antecubital.  Patient tolerated draw with no distress.   
General: Bowel sounds are normal.      Palpations: Abdomen is soft.   Skin:     Findings: No rash.   Neurological:      General: No focal deficit present.      Mental Status: She is alert and oriented to person, place, and time.      Cranial Nerves: No cranial nerve deficit.      Sensory: No sensory deficit.      Gait: Gait normal.      Comments: No pronator drift, bilat.     +Tenderness to light palpation over Left temple.    Psychiatric:         Mood and Affect: Mood normal.         Lab Results   Component Value Date/Time    WBC 6.3 12/01/2023 09:25 AM    HGB 13.2 12/01/2023 09:25 AM    HCT 38.9 12/01/2023 09:25 AM     12/01/2023 09:25 AM    MCV 94.2 12/01/2023 09:25 AM     Lab Results   Component Value Date/Time     12/01/2023 09:25 AM    K 3.8 12/01/2023 09:25 AM     12/01/2023 09:25 AM    CO2 28 12/01/2023 09:25 AM    BUN 21 12/01/2023 09:25 AM    GFRAA >60 06/23/2022 07:25 AM    GLOB 3.2 12/01/2023 09:25 AM    ALT 24 12/01/2023 09:25 AM    AST 18 12/01/2023 09:25 AM     Lab Results   Component Value Date/Time    CHOL 152 12/01/2023 09:25 AM    HDL 43 12/01/2023 09:25 AM     On this date 1/24/24 I have spent >45 minutes for chart review, face to face encounter with the patient for interview/exam, discussing working diagnosis and treatment plan as well as documenting on the day of the visit.      Medical decision making complexity: moderate-high.    Temi Lincoln MD   Family & Geriatric Medicine

## 2024-01-25 LAB — CRP SERPL-MCNC: <0.3 MG/DL (ref 0–0.3)

## 2024-01-29 ENCOUNTER — TELEPHONE (OUTPATIENT)
Facility: CLINIC | Age: 69
End: 2024-01-29

## 2024-01-29 DIAGNOSIS — R05.2 SUBACUTE COUGH: Primary | ICD-10-CM

## 2024-01-29 RX ORDER — AZITHROMYCIN 250 MG/1
250 TABLET, FILM COATED ORAL SEE ADMIN INSTRUCTIONS
Qty: 6 TABLET | Refills: 0 | Status: SHIPPED | OUTPATIENT
Start: 2024-01-29 | End: 2024-02-03

## 2024-01-29 NOTE — TELEPHONE ENCOUNTER
Pt called back to say that during her appointment on 1/24/24 Dr. Lincoln offered to give her an antibiotic but she declined but was told to call back if she decided she wanted it. She said she has had a headache all weekend and does think she has a sinus infection that was discussed during the appointment. Can this be called in?

## 2024-01-29 NOTE — TELEPHONE ENCOUNTER
Called patient and she has no allergy to Azithromycin. She states understanding to instruction/recommendation.

## 2024-02-01 ENCOUNTER — TELEPHONE (OUTPATIENT)
Facility: CLINIC | Age: 69
End: 2024-02-01

## 2024-02-01 NOTE — TELEPHONE ENCOUNTER
Patient called and said that Dr. Lincoln put her on antibiotic for 5 days and she has one day left, but she is worried that she needs extended dose.  She said that it is down in her chest.  She was coughing right much on the phone.  Please call her at [phone].

## 2024-02-01 NOTE — TELEPHONE ENCOUNTER
Spoke to patient and instructed regarding ER if needed. Notified her to call me Monday and let me know how she is doing and if we need to we can get her a sick appointment to be reassessed.

## 2024-02-01 NOTE — TELEPHONE ENCOUNTER
Called patient and notified her that the antibiotic will continue to work for days after the last dose. She is asking if she needs another round of antibiotic. Notified her to take her last dose as scheduled tomorrow and if no better by Monday to call us back Monday. I also advised if any other instruction I would call her back.

## 2024-02-07 ENCOUNTER — HOSPITAL ENCOUNTER (EMERGENCY)
Age: 69
Discharge: HOME | End: 2024-02-07
Attending: FAMILY MEDICINE
Payer: MEDICARE

## 2024-02-07 ENCOUNTER — APPOINTMENT (OUTPATIENT)
Age: 69
End: 2024-02-07
Payer: MEDICARE

## 2024-02-07 VITALS
RESPIRATION RATE: 15 BRPM | SYSTOLIC BLOOD PRESSURE: 127 MMHG | BODY MASS INDEX: 30.22 KG/M2 | HEART RATE: 80 BPM | OXYGEN SATURATION: 98 % | TEMPERATURE: 97.7 F | DIASTOLIC BLOOD PRESSURE: 67 MMHG | HEIGHT: 64 IN | WEIGHT: 177 LBS

## 2024-02-07 DIAGNOSIS — M25.561 RIGHT KNEE PAIN, UNSPECIFIED CHRONICITY: Primary | ICD-10-CM

## 2024-02-07 PROCEDURE — 73562 X-RAY EXAM OF KNEE 3: CPT

## 2024-02-07 PROCEDURE — 99283 EMERGENCY DEPT VISIT LOW MDM: CPT

## 2024-02-07 ASSESSMENT — LIFESTYLE VARIABLES
HOW OFTEN DO YOU HAVE A DRINK CONTAINING ALCOHOL: NEVER
HOW MANY STANDARD DRINKS CONTAINING ALCOHOL DO YOU HAVE ON A TYPICAL DAY: PATIENT DOES NOT DRINK

## 2024-02-07 NOTE — ED PROVIDER NOTES
as soon as possible for a visit         DISCHARGE MEDICATIONS:  New Prescriptions    No medications on file     Controlled Substances Monitoring:          No data to display                (Please note that portions of this note were completed with a voice recognition program.  Efforts were made to edit the dictations but occasionally words are mis-transcribed.)    Jourdan Ellington DO (electronically signed)  Attending Emergency Physician           Jourdan Ellington,   02/07/24 [id number]

## 2024-02-07 NOTE — DISCHARGE INSTRUCTIONS
As we spoke x-ray just shows some arthritis, will give you an Ace bandage, continue with Tylenol ibuprofen use ice to help with any swelling.  Call Dr. Rosario's office and schedule an appointment to be seen.  X-rays are negative for any fracture

## 2024-02-26 ENCOUNTER — OFFICE VISIT (OUTPATIENT)
Age: 69
End: 2024-02-26

## 2024-02-26 VITALS — BODY MASS INDEX: 26.63 KG/M2 | WEIGHT: 156 LBS | HEIGHT: 64 IN

## 2024-02-26 DIAGNOSIS — M17.11 PRIMARY OSTEOARTHRITIS OF RIGHT KNEE: Primary | ICD-10-CM

## 2024-02-26 NOTE — PROGRESS NOTES
pleasant appearing individual, appropriately dressed, well hydrated, well nourished, who is alert, appropriately oriented for age, and in no acute distress with a normal gait and normal affect who does not appear to be in any significant pain.     Physical Exam:  Right Knee -Decrease range of motion with flexion, Knee arc of greater than 50 degrees, Some crepitation, Grossly neurovascularly intact, Good cap refill, No skin lesion, Moderate swelling, some gross instability, Some quadriceps weakness, Kellgren and Luiz at least grade 4    Left Knee - Full Range of Motion, No crepitation, Grossly neurovascularly intact, Good cap refill, No skin lesion, No swelling, No gross instability, No quadriceps weakness    Visit Diagnoses         Codes    Primary osteoarthritis of right knee    -  Primary M17.11               HPI:  The patient is here with a chief complaint of right knee pain, throbbing, burning pain, diagnosed with osteoarthritis.    X-rays are positive for moderate-to-severe OA.    Assessment/Plan:  Plan will be shot today.  If it helps that is all we need to do.  We will see the patient back as needed and go from there.    As part of continued conservative pain management options the patient was advised to utilize Tylenol or OTC NSAIDS as long as it is not medically contraindicated.     Return to Office:    Follow-up and Dispositions    Return if symptoms worsen or fail to improve.        Scribed by Elvi Murguia MA as dictated by Dean Rosario MD.  Documentation, performed by, True and Accepted Dean Rosario MD

## 2024-02-27 ENCOUNTER — TELEMEDICINE (OUTPATIENT)
Facility: CLINIC | Age: 69
End: 2024-02-27
Payer: MEDICARE

## 2024-02-27 DIAGNOSIS — Z11.59 ENCOUNTER FOR HCV SCREENING TEST FOR LOW RISK PATIENT: ICD-10-CM

## 2024-02-27 DIAGNOSIS — Z00.00 MEDICARE ANNUAL WELLNESS VISIT, SUBSEQUENT: Primary | ICD-10-CM

## 2024-02-27 PROCEDURE — G0439 PPPS, SUBSEQ VISIT: HCPCS | Performed by: FAMILY MEDICINE

## 2024-02-27 PROCEDURE — 1123F ACP DISCUSS/DSCN MKR DOCD: CPT | Performed by: FAMILY MEDICINE

## 2024-02-27 RX ORDER — LIDOCAINE HYDROCHLORIDE 10 MG/ML
9 INJECTION, SOLUTION INFILTRATION; PERINEURAL ONCE
Status: COMPLETED | OUTPATIENT
Start: 2024-02-27 | End: 2024-02-27

## 2024-02-27 RX ORDER — TRIAMCINOLONE ACETONIDE 40 MG/ML
40 INJECTION, SUSPENSION INTRA-ARTICULAR; INTRAMUSCULAR ONCE
Status: COMPLETED | OUTPATIENT
Start: 2024-02-27 | End: 2024-02-27

## 2024-02-27 RX ADMIN — LIDOCAINE HYDROCHLORIDE 9 ML: 10 INJECTION, SOLUTION INFILTRATION; PERINEURAL at 21:39

## 2024-02-27 RX ADMIN — TRIAMCINOLONE ACETONIDE 40 MG: 40 INJECTION, SUSPENSION INTRA-ARTICULAR; INTRAMUSCULAR at 21:39

## 2024-02-27 ASSESSMENT — PATIENT HEALTH QUESTIONNAIRE - PHQ9
2. FEELING DOWN, DEPRESSED OR HOPELESS: 0
1. LITTLE INTEREST OR PLEASURE IN DOING THINGS: 0
SUM OF ALL RESPONSES TO PHQ QUESTIONS 1-9: 0
SUM OF ALL RESPONSES TO PHQ QUESTIONS 1-9: 0
SUM OF ALL RESPONSES TO PHQ9 QUESTIONS 1 & 2: 0
SUM OF ALL RESPONSES TO PHQ QUESTIONS 1-9: 0
SUM OF ALL RESPONSES TO PHQ QUESTIONS 1-9: 0

## 2024-02-27 ASSESSMENT — LIFESTYLE VARIABLES: HOW OFTEN DO YOU HAVE A DRINK CONTAINING ALCOHOL: NEVER

## 2024-02-27 NOTE — PROGRESS NOTES
This is the Subsequent Medicare Annual Wellness Exam, performed 12 months or more after the Initial AWV or the last Subsequent AWV     I have reviewed the patient's medical history in detail and updated the computerized patient record.     The services today are being conducted using telemedicine which Indu RIDDLE Matthew has consented to at the time of scheduling.    ASSESSMENT/PLAN:  1. Medicare annual wellness visit, subsequent  2. Encounter for HCV screening test for low risk patient  -     Hepatitis C Antibody; Future    In addition, please see separate nurse note for details of screening questionnaires.   Screening for general health risk assessment, depression, alcohol/substance use, safety, function/falls, cognition: Normal.    Vaccines do not up to date; pt [facility]    Medicare recommended screening and prevention reviewed:  DEXA 2/2022: Osteopenia  Mammography (Q2 yrs; 50-74 yr): 7/2024 BIRADS-1; plan to order mammogram during next visit due after July 2024.  Cervical cancer screening (until 65 yrs): Aged out; no hx abnormal results  Colorectal cancer screening (Q1 yr FIT test, Q3yr Cologuard, Q5yr flex sig, Q10 yr colonoscopy): Dec 2023 NEGATIVE, history of colon polyps so patient plans to schedule for updated colonoscopy in the near future.  No hx tobacco use    Advance Care Planning:  Pt appears to have full decisional capacity.  mPOA: Daughter primary  Code status: FULL CODE but would not want prolonged efforts if there was not a chance for meaningful recovery.  ;;; plan to provide standard VA AD documents next visit.      I advised that she schedule follow-up with ENT to discuss postnasal drip, hearing loss.    We are scheduled for follow-up in August 2024.  We reviewed plans for ongoing coverage for our office during my upcoming maternity leave starting ~March 2024.      Patient understands our medical plan. Patient has provided input and agrees with goals.  All 
Yes      ADLs  In the past 7 days, did you need help from others to perform any of the following everyday activities: Eating, dressing, grooming, bathing, toileting, or walking/balance?: No  In the past 7 days, did you need help from others to take care of any of the following: Laundry, housekeeping, banking/finances, shopping, telephone use, food preparation, transportation, or taking medications?: No    Health Maintenance reviewed and discussed and ordered per Provider.    Health Maintenance Due   Topic Date Due    Hepatitis C screen  Never done    DTaP/Tdap/Td vaccine (1 - Tdap) Never done    Shingles vaccine (1 of 2) Never done    Respiratory Syncytial Virus (RSV) Pregnant or age 60 yrs+ (1 - 1-dose 60+ series) Never done    COVID-19 Vaccine (5 - 2023-24 season) 09/01/2023    Annual Wellness Visit (Medicare Advantage)  Never done        Coordination of Care:    1. \"Have you been to the ER, urgent care clinic since your last visit?  Hospitalized since your last visit?\" Yes Where: [facility]    2. \"Have you seen or consulted any other health care providers outside of the [facility] since your last visit?\" Yes Where: Dr Rosario yesterday for knee injection

## 2024-02-27 NOTE — PATIENT INSTRUCTIONS
Learning About Being Active as an Older Adult  Why is being active important as you get older?     Being active is one of the best things you can do for your health. And it's never too late to start. Being active--or getting active, if you aren't already--has definite benefits. It can:  Give you more energy,  Keep your mind sharp.  Improve balance to reduce your risk of falls.  Help you manage chronic illness with fewer medicines.  No matter how old you are, how fit you are, or what health problems you have, there is a form of activity that will work for you. And the more physical activity you can do, the better your overall health will be.  What kinds of activity can help you stay healthy?  Being more active will make your daily activities easier. Physical activity includes planned exercise and things you do in daily life. There are four types of activity:  Aerobic.  Doing aerobic activity makes your heart and lungs strong.  Includes walking, dancing, and gardening.  Aim for at least 2½ hours spread throughout the week.  It improves your energy and can help you sleep better.  Muscle-strengthening.  This type of activity can help maintain muscle and strengthen bones.  Includes climbing stairs, using resistance bands, and lifting or carrying heavy loads.  Aim for at least twice a week.  It can help protect the knees and other joints.  Stretching.  Stretching gives you better range of motion in joints and muscles.  Includes upper arm stretches, calf stretches, and gentle yoga.  Aim for at least twice a week, preferably after your muscles are warmed up from other activities.  It can help you function better in daily life.  Balancing.  This helps you stay coordinated and have good posture.  Includes heel-to-toe walking, mei chi, and certain types of yoga.  Aim for at least 3 days a week.  It can reduce your risk of falling.  Even if you have a hard time meeting the recommendations, it's better to be more active

## 2024-04-01 ENCOUNTER — HOSPITAL ENCOUNTER (OUTPATIENT)
Age: 69
Discharge: HOME | End: 2024-04-04
Payer: MEDICARE

## 2024-04-01 DIAGNOSIS — D32.9 MENINGIOMA (HCC): ICD-10-CM

## 2024-04-01 PROCEDURE — 6360000004 HC RX CONTRAST MEDICATION: Performed by: NURSE PRACTITIONER

## 2024-04-01 PROCEDURE — 70553 MRI BRAIN STEM W/O & W/DYE: CPT

## 2024-04-01 PROCEDURE — A9579 GAD-BASE MR CONTRAST NOS,1ML: HCPCS | Performed by: NURSE PRACTITIONER

## 2024-04-01 RX ADMIN — GADOTERIDOL 14 ML: 279.3 INJECTION, SOLUTION INTRAVENOUS at 14:40

## 2024-04-18 ENCOUNTER — OFFICE VISIT (OUTPATIENT)
Age: 69
End: 2024-04-18
Payer: MEDICARE

## 2024-04-18 VITALS
TEMPERATURE: 98 F | HEART RATE: 71 BPM | OXYGEN SATURATION: 96 % | HEIGHT: 64 IN | WEIGHT: 154.8 LBS | DIASTOLIC BLOOD PRESSURE: 65 MMHG | SYSTOLIC BLOOD PRESSURE: 133 MMHG | BODY MASS INDEX: 26.43 KG/M2 | RESPIRATION RATE: 18 BRPM

## 2024-04-18 DIAGNOSIS — R91.8 MULTIPLE PULMONARY NODULES: Primary | ICD-10-CM

## 2024-04-18 DIAGNOSIS — J45.909 MILD ASTHMA WITHOUT COMPLICATION, UNSPECIFIED WHETHER PERSISTENT: ICD-10-CM

## 2024-04-18 PROCEDURE — 99204 OFFICE O/P NEW MOD 45 MIN: CPT | Performed by: INTERNAL MEDICINE

## 2024-04-18 PROCEDURE — 1123F ACP DISCUSS/DSCN MKR DOCD: CPT | Performed by: INTERNAL MEDICINE

## 2024-04-18 RX ORDER — ALBUTEROL SULFATE 90 UG/1
2 AEROSOL, METERED RESPIRATORY (INHALATION) EVERY 6 HOURS PRN
Qty: 18 G | Refills: 3 | Status: SHIPPED | OUTPATIENT
Start: 2024-04-18

## 2024-04-18 NOTE — PROGRESS NOTES
Indu RIDDLE Colon presents today for   Chief Complaint   Patient presents with    New Patient     Referred By Dr.Pamela Lincoln for Pulmonary Nodule     Cough       Is someone accompanying this pt? No    Is the patient using any DME equipment during OV? No    -DME Company NA    Depression Screenin/27/2024     8:39 AM   PHQ-9 Questionaire   Little interest or pleasure in doing things 0   Feeling down, depressed, or hopeless 0   PHQ-9 Total Score 0       Learning Needs Questionnaire:     Who is the primary learner? Patient    What is the preferred language for health care of the primary learner? ENGLISH    How does the primary learner prefer to learn new concepts? DEMONSTRATION    Answered By Patient    Relationship to Learner SELF          Fall Risk:         2023     8:20 AM 2023     8:56 AM   Fall Risk   2 or more falls in past year? no no   Fall with injury in past year? no no        Abuse Screening:          No data to display                  Coordination of Care:    1. Have you been to the ER, urgent care clinic since your last visit? Hospitalized since your last visit? No    2. Have you seen or consulted any other health care providers outside of the [facility] since your last visit? Include any pap smears or colon screening. No    Medication list has been update per patient.        
Left Ventricle: Mild concentric hypertrophy. Estimated left ventricular ejection fraction is 56 - 60%. No regional wall motion abnormality noted. Mild (grade 1) left ventricular diastolic dysfunction.  · Normal right ventricular size and function.  · Left Atrium: Moderately dilated left atrium.  · Mitral Valve: Mitral valve thickening. Mild mitral valve regurgitation.  · Tricuspid Valve: Mild tricuspid valve regurgitation is present. There is no evidence of pulmonary hypertension.    Signed by: Becky Marcos MD on 5/21/2019  4:51 PM, Signed by: Unknown Provider Result on 5/21/2019 12:00 AM      Imaging:  I have personally reviewed the patient’s radiographs and have reviewed the reports:  XR Results (most recent):  N/A.      CT Results (most recent):  CT CHEST (8/17/21):  FINDINGS:     LUNGS: No significant change of bilateral subpleural nodular densities.  Right  middle lobe scarring.     PLEURA: Normal.     AIRWAY: Normal.     MEDIASTINUM: Normal heart size.  No pericardial effusion.  Great vessels  unremarkable.  The thyroid is grossly within normal limits.  Esophagus appears  unremarkable.     LYMPH NODES: No enlarged lymph nodes.     UPPER ABDOMEN: Left renal cyst is incompletely imaged. Surgical clips in the  gallbladder.     OTHER: Nondisplaced comminuted sternal fracture is noted.  There is  dextrocurvature of the thoracic spine. Left anterior third rib minimally  displaced fracture.  ______________     IMPRESSION:   1. Acute/subacute sternal and left third rib fractures.           Patient Active Problem List   Diagnosis    Hematuria    Non-rheumatic mitral regurgitation    Abnormal EKG    UTI (urinary tract infection)    Osteoporosis    Kidney stones    Atypical angina (HCC)    Aortic atherosclerosis (HCC)    Chest pain    Pure hypercholesterolemia, unspecified    Brain tumor (benign) (HCC)       IMPRESSION:   Multiple pulmonary nodules: dominant nodule measuring 13 mm in the LLL.; pleural-based. +Uncle

## 2024-04-18 NOTE — PATIENT INSTRUCTIONS
What is the plan?  -Complete CAT/CT scan of your lungs  -Complete lung function test   -Use Albuterol rescue inhaler as needed

## 2024-04-23 ENCOUNTER — HOSPITAL ENCOUNTER (OUTPATIENT)
Age: 69
Discharge: HOME | End: 2024-04-26
Attending: INTERNAL MEDICINE
Payer: MEDICARE

## 2024-04-23 DIAGNOSIS — R91.8 MULTIPLE PULMONARY NODULES: ICD-10-CM

## 2024-04-23 PROCEDURE — 71250 CT THORAX DX C-: CPT

## 2024-04-25 ENCOUNTER — TELEPHONE (OUTPATIENT)
Age: 69
End: 2024-04-25

## 2024-05-03 ENCOUNTER — TELEPHONE (OUTPATIENT)
Age: 69
End: 2024-05-03

## 2024-05-03 NOTE — TELEPHONE ENCOUNTER
Attempted to reach patient this morning concerning results of CT chest without success; VM left.  Recommend evaluation by cardiology given coronary atherosclerosis as noted on CT chest. Previously seen by Dr. Marcos; will message.      Tony Jefferson DO   05/03/24  Pulmonary, Critical Care Medicine  [facility]

## 2024-05-06 ENCOUNTER — TELEPHONE (OUTPATIENT)
Age: 69
End: 2024-05-06

## 2024-05-06 NOTE — TELEPHONE ENCOUNTER
Mom verbalized understanding of all discharge orders and follow up.  See flowsheet for full assessment. See AVS for instructions given.  Mom and baby id match   Pt is returning DA call from 5/3. Pt stated to please call cell [phone].

## 2024-05-14 ENCOUNTER — TELEPHONE (OUTPATIENT)
Age: 69
End: 2024-05-14

## 2024-05-20 ENCOUNTER — OFFICE VISIT (OUTPATIENT)
Age: 69
End: 2024-05-20
Payer: MEDICARE

## 2024-05-20 ENCOUNTER — TELEPHONE (OUTPATIENT)
Age: 69
End: 2024-05-20

## 2024-05-20 VITALS
HEIGHT: 64 IN | SYSTOLIC BLOOD PRESSURE: 121 MMHG | DIASTOLIC BLOOD PRESSURE: 65 MMHG | OXYGEN SATURATION: 96 % | WEIGHT: 156 LBS | HEART RATE: 70 BPM | BODY MASS INDEX: 26.63 KG/M2

## 2024-05-20 DIAGNOSIS — I34.0 NON-RHEUMATIC MITRAL REGURGITATION: ICD-10-CM

## 2024-05-20 DIAGNOSIS — E78.00 PURE HYPERCHOLESTEROLEMIA, UNSPECIFIED: ICD-10-CM

## 2024-05-20 DIAGNOSIS — I25.10 CORONARY ARTERY CALCIFICATION SEEN ON CAT SCAN: Primary | ICD-10-CM

## 2024-05-20 DIAGNOSIS — R94.31 ABNORMAL EKG: ICD-10-CM

## 2024-05-20 DIAGNOSIS — R07.9 CHEST PAIN, UNSPECIFIED TYPE: ICD-10-CM

## 2024-05-20 PROCEDURE — 99214 OFFICE O/P EST MOD 30 MIN: CPT | Performed by: INTERNAL MEDICINE

## 2024-05-20 PROCEDURE — 1123F ACP DISCUSS/DSCN MKR DOCD: CPT | Performed by: INTERNAL MEDICINE

## 2024-05-20 PROCEDURE — 93000 ELECTROCARDIOGRAM COMPLETE: CPT | Performed by: INTERNAL MEDICINE

## 2024-05-20 RX ORDER — ATORVASTATIN CALCIUM 20 MG/1
20 TABLET, FILM COATED ORAL DAILY
Qty: 90 TABLET | Refills: 3 | Status: SHIPPED | OUTPATIENT
Start: 2024-05-20

## 2024-05-20 ASSESSMENT — ENCOUNTER SYMPTOMS
SHORTNESS OF BREATH: 1
EYES NEGATIVE: 1
GASTROINTESTINAL NEGATIVE: 1

## 2024-05-20 ASSESSMENT — PATIENT HEALTH QUESTIONNAIRE - PHQ9
SUM OF ALL RESPONSES TO PHQ QUESTIONS 1-9: 0
SUM OF ALL RESPONSES TO PHQ QUESTIONS 1-9: 0
2. FEELING DOWN, DEPRESSED OR HOPELESS: NOT AT ALL
1. LITTLE INTEREST OR PLEASURE IN DOING THINGS: NOT AT ALL
DEPRESSION UNABLE TO ASSESS: FUNCTIONAL CAPACITY MOTIVATION LIMITS ACCURACY
SUM OF ALL RESPONSES TO PHQ QUESTIONS 1-9: 0
SUM OF ALL RESPONSES TO PHQ9 QUESTIONS 1 & 2: 0
SUM OF ALL RESPONSES TO PHQ QUESTIONS 1-9: 0

## 2024-05-20 NOTE — TELEPHONE ENCOUNTER
Patient  is requesting a cb regarding her CT Chest results.  Pt stated that Dr Jefferson  suggested pt have addt'l tests (PFT) depending upon CT results. Please contact pt @ [phone] or [phone].  Pt stated to LVM on Home# if she didn't answer phone dt to her not hearing phone she'll be cutting grass.  ADS

## 2024-05-20 NOTE — PROGRESS NOTES
Indu Castro is a 68 y.o. year old female.    Follow-up of MR, hyperlipidemia    2/28/2023 has been having increasing dyspnea on exertion for last 3 months since she had COVID in 12/22.  But dyspnea started about a year ago.  It gets worse when she has to carry some load like firewood inside the house.  Feels more tired than in the past.  Chest pains under the left breast have been present intermittently and are not related to any activity.  Gets almost daily mild edema which improves with compression stockings and leg elevation.  No significant dizziness palpitations or loss of consciousness.  5/9/2023 chest pains continue under the left breast as well as in the right upper precordial area as before without any relation to activity.  Clinically they are nonanginal.  Dyspnea is noted only if she carries a heavy weight like a case of 32 bottles of water and walks.  Occasional dizziness may be related to gabapentin but no fainty feelings or passing out.  Edema has been present for many years off and on and is worse in the evenings.  It improves on leg elevation.  2/20/2024 chest pains are much better but still present occasionally.  Heavy exertion like carrying wood inside in the winter months can cause tiredness and shortness of breath.  No significant dizziness or palpitations. Edema as before which improves at night with elevation.          Review of Systems   Constitutional: Negative.    HENT: Negative.     Eyes: Negative.    Respiratory:  Positive for shortness of breath.    Cardiovascular:  Positive for chest pain and leg swelling.   Gastrointestinal: Negative.    Endocrine: Negative.    Genitourinary: Negative.    Musculoskeletal: Negative.    Neurological: Negative.    Psychiatric/Behavioral: Negative.     All other systems reviewed and are negative.        Physical Exam  Vitals and nursing note reviewed.   Constitutional:       Appearance: Normal appearance.   HENT:      Head: Normocephalic and atraumatic.

## 2024-05-23 ENCOUNTER — TELEPHONE (OUTPATIENT)
Age: 69
End: 2024-05-23

## 2024-05-23 NOTE — TELEPHONE ENCOUNTER
Pt called in wanting a call back in regards to her CT results, pls advise and call pt on her cell phone [phone]

## 2024-05-29 ENCOUNTER — TELEPHONE (OUTPATIENT)
Age: 69
End: 2024-05-29

## 2024-05-29 NOTE — TELEPHONE ENCOUNTER
I called and discussed results of CT chest with patient (see results below).  No interval change and pulmonary nodules which were present since 2020.  No further imaging surveillance recommended.  Patient does not meet criteria for lung cancer screening given she is a never smoker.  With regards to her coronary calcifications, patient is currently following with cardiology.  All questions answered to her satisfaction.       CT Result (most recent):  CT CHEST WO CONTRAST 04/23/2024    Narrative  INDICATION: Pulmonary nodules    COMPARISON: CT chest 6/22/2020 and 8/17/2021    CONTRAST: None.    TECHNIQUE:  5 mm axial images were obtained through the chest. Coronal and  sagittal reformats were generated.  CT dose reduction was achieved through use  of a standardized protocol tailored for this examination and automatic exposure  control for dose modulation.    The absence of intravenous contrast reduces the sensitivity for evaluation of  the mediastinum, darryl, vasculature, and upper abdominal organs.    FINDINGS:    CHEST WALL: No mass or axillary lymphadenopathy.  THYROID: No nodule.  MEDIASTINUM: No mass or lymphadenopathy.  DARRYL: No mass or lymphadenopathy.  THORACIC AORTA: No aneurysm.  MAIN PULMONARY ARTERY: Normal in caliber.  TRACHEA/BRONCHI: Patent.  ESOPHAGUS: No wall thickening or dilatation.  HEART: Normal in size. Coronary artery calcification is present, greater than  expected for patient of this age.  PLEURA: No effusion or pneumothorax.  LUNGS: The lungs are well aerated. There is stable scarring medially in the  right middle lobe. Posteriorly in the right upper lobe on image 11 is an  oval-shaped 5 x 3 mm nodule that is unchanged.    Posteriorly medially in the right lower lobe on image 30 is an oval-shaped 7 x 3  mm nodule that is unchanged.    Subpleural nodule previously described in the right lower lobe is no longer  present.    Mild dependent areas of atelectasis are noted in each lung

## 2024-06-25 ENCOUNTER — OFFICE VISIT (OUTPATIENT)
Dept: FAMILY MEDICINE CLINIC | Facility: CLINIC | Age: 69
End: 2024-06-25
Payer: MEDICARE

## 2024-06-25 ENCOUNTER — HOSPITAL ENCOUNTER (OUTPATIENT)
Age: 69
Discharge: HOME | End: 2024-06-28
Payer: MEDICARE

## 2024-06-25 VITALS
WEIGHT: 156.4 LBS | RESPIRATION RATE: 18 BRPM | OXYGEN SATURATION: 96 % | BODY MASS INDEX: 26.7 KG/M2 | HEIGHT: 64 IN | HEART RATE: 70 BPM | SYSTOLIC BLOOD PRESSURE: 117 MMHG | DIASTOLIC BLOOD PRESSURE: 63 MMHG | TEMPERATURE: 97.7 F

## 2024-06-25 DIAGNOSIS — N39.46 MIXED STRESS AND URGE URINARY INCONTINENCE: ICD-10-CM

## 2024-06-25 DIAGNOSIS — R39.9 URINARY TRACT INFECTION SYMPTOMS: Primary | ICD-10-CM

## 2024-06-25 DIAGNOSIS — R39.9 URINARY TRACT INFECTION SYMPTOMS: ICD-10-CM

## 2024-06-25 LAB
ALBUMIN SERPL-MCNC: 3.9 G/DL (ref 3.4–5)
ALBUMIN/GLOB SERPL: 1.2 (ref 0.8–1.7)
ALP SERPL-CCNC: 64 U/L (ref 45–117)
ALT SERPL-CCNC: 19 U/L (ref 13–56)
ANION GAP SERPL CALC-SCNC: 5 MMOL/L (ref 3–18)
AST SERPL W P-5'-P-CCNC: 18 U/L (ref 10–38)
BASOPHILS # BLD: 0.1 K/UL (ref 0–0.1)
BASOPHILS NFR BLD: 1 % (ref 0–2)
BILIRUB SERPL-MCNC: 1.3 MG/DL (ref 0.2–1)
BILIRUBIN, URINE, POC: NEGATIVE
BLOOD URINE, POC: NORMAL
BUN SERPL-MCNC: 14 MG/DL (ref 7–18)
BUN/CREAT SERPL: 25 (ref 12–20)
CA-I BLD-MCNC: 9.5 MG/DL (ref 8.5–10.1)
CHLORIDE SERPL-SCNC: 111 MMOL/L (ref 100–111)
CO2 SERPL-SCNC: 26 MMOL/L (ref 21–32)
CREAT SERPL-MCNC: 0.55 MG/DL (ref 0.6–1.3)
DIFFERENTIAL METHOD BLD: ABNORMAL
EOSINOPHIL # BLD: 0.1 K/UL (ref 0–0.4)
EOSINOPHIL NFR BLD: 2 % (ref 0–5)
ERYTHROCYTE [DISTWIDTH] IN BLOOD BY AUTOMATED COUNT: 11.7 % (ref 11.6–14.5)
GLOBULIN SER CALC-MCNC: 3.2 G/DL (ref 2–4)
GLUCOSE SERPL-MCNC: 84 MG/DL (ref 74–99)
GLUCOSE URINE, POC: NEGATIVE
HCT VFR BLD AUTO: 38.3 % (ref 35–45)
HGB BLD-MCNC: 12.8 G/DL (ref 12–16)
IMM GRANULOCYTES # BLD AUTO: 0 K/UL (ref 0–0.04)
IMM GRANULOCYTES NFR BLD AUTO: 0 % (ref 0–0.5)
KETONES, URINE, POC: NEGATIVE
LEUKOCYTE ESTERASE, URINE, POC: NEGATIVE
LYMPHOCYTES # BLD: 2.2 K/UL (ref 0.9–3.6)
LYMPHOCYTES NFR BLD: 33 % (ref 21–52)
MCH RBC QN AUTO: 31.6 PG (ref 24–34)
MCHC RBC AUTO-ENTMCNC: 33.4 G/DL (ref 31–37)
MCV RBC AUTO: 94.6 FL (ref 78–100)
MONOCYTES # BLD: 0.5 K/UL (ref 0.05–1.2)
MONOCYTES NFR BLD: 7 % (ref 3–10)
NEUTS SEG # BLD: 3.8 K/UL (ref 1.8–8)
NEUTS SEG NFR BLD: 57 % (ref 40–73)
NITRITE, URINE, POC: NEGATIVE
NRBC # BLD: 0 K/UL (ref 0–0.01)
NRBC BLD-RTO: 0 PER 100 WBC
PH, URINE, POC: 6.5 (ref 4.6–8)
PLATELET # BLD AUTO: 248 K/UL (ref 135–420)
PMV BLD AUTO: 9.5 FL (ref 9.2–11.8)
POTASSIUM SERPL-SCNC: 3.7 MMOL/L (ref 3.5–5.5)
PROT SERPL-MCNC: 7.1 G/DL (ref 6.4–8.2)
PROTEIN,URINE, POC: NEGATIVE
RBC # BLD AUTO: 4.05 M/UL (ref 4.2–5.3)
SODIUM SERPL-SCNC: 142 MMOL/L (ref 136–145)
SPECIFIC GRAVITY, URINE, POC: 1.01 (ref 1–1.03)
URINALYSIS CLARITY, POC: CLEAR
URINALYSIS COLOR, POC: YELLOW
UROBILINOGEN, POC: NORMAL
WBC # BLD AUTO: 6.7 K/UL (ref 4.6–13.2)

## 2024-06-25 PROCEDURE — 81003 URINALYSIS AUTO W/O SCOPE: CPT

## 2024-06-25 PROCEDURE — 85025 COMPLETE CBC W/AUTO DIFF WBC: CPT

## 2024-06-25 PROCEDURE — 99214 OFFICE O/P EST MOD 30 MIN: CPT

## 2024-06-25 PROCEDURE — 87086 URINE CULTURE/COLONY COUNT: CPT

## 2024-06-25 PROCEDURE — 80053 COMPREHEN METABOLIC PANEL: CPT

## 2024-06-25 PROCEDURE — 1123F ACP DISCUSS/DSCN MKR DOCD: CPT

## 2024-06-25 RX ORDER — AMOXICILLIN AND CLAVULANATE POTASSIUM 875; 125 MG/1; MG/1
1 TABLET, FILM COATED ORAL EVERY 12 HOURS
COMMUNITY
Start: 2024-06-20

## 2024-06-26 LAB
BACTERIA SPEC CULT: NORMAL
Lab: NORMAL

## 2024-07-03 RX ORDER — ATORVASTATIN CALCIUM 20 MG/1
20 TABLET, FILM COATED ORAL DAILY
Qty: 90 TABLET | Refills: 3 | Status: SHIPPED | OUTPATIENT
Start: 2024-07-03

## 2024-07-03 NOTE — TELEPHONE ENCOUNTER
Requested Prescriptions     Pending Prescriptions Disp Refills    atorvastatin (LIPITOR) 20 MG tablet [Pharmacy Med Name: Atorvastatin Calcium 10 MG Oral Tablet] 90 tablet 3     Sig: Take 1 tablet by mouth daily

## 2024-07-08 ENCOUNTER — TELEPHONE (OUTPATIENT)
Dept: FAMILY MEDICINE CLINIC | Facility: CLINIC | Age: 69
End: 2024-07-08

## 2024-07-08 NOTE — TELEPHONE ENCOUNTER
----- Message from Mary Jane Potter sent at 7/8/2024  1:16 PM EDT -----  Regarding: FW: ECC Escalation To Practice    ----- Message -----  From: Sajan Vergara Sena Felipe  Sent: 7/8/2024  12:26 PM EDT  To: Hosea Palmer [facility] Med Clinical Staff  Subject: ECC Escalation To Practice                       ECC Escalation To Practice      Type of Escalation: Acute Care Symptom  --------------------------------------------------------------------------------------------------------------------------    Information for Provider:  Patient is looking for appointment for: Symptom  stomach pain  Reasons for Message: Patient disconnected     Additional Information : patient experiencing a stomach pain in the right side  --------------------------------------------------------------------------------------------------------------------------    Relationship to Patient: Self    Call Back Info: OK to leave message on voicemail  Preferred Call Back Number: Phone [phone] (home) [phone] (work)

## 2024-07-08 NOTE — TELEPHONE ENCOUNTER
----- Message from ADINA Franz NP sent at 7/8/2024  7:18 AM EDT -----  Please call her, her labs look okay   ----- Message -----  From: Mary Jane Potter  Sent: 7/3/2024  10:02 AM EDT  To: ADINA Franz NP    Patient is wanting to review her labs if you could get a nurse to call her with results. Thank you

## 2024-07-09 ENCOUNTER — TELEPHONE (OUTPATIENT)
Dept: FAMILY MEDICINE CLINIC | Facility: CLINIC | Age: 69
End: 2024-07-09

## 2024-07-09 NOTE — TELEPHONE ENCOUNTER
Tried to call patient back initially be cause there was an ECC call stating patient was having abdominal pain but I was unable to reach patient. I called patient back from this call and unable to leave message on machine.

## 2024-07-10 ENCOUNTER — HOSPITAL ENCOUNTER (OUTPATIENT)
Age: 69
Discharge: HOME | End: 2024-07-13
Payer: MEDICARE

## 2024-07-10 DIAGNOSIS — E78.00 PURE HYPERCHOLESTEROLEMIA, UNSPECIFIED: ICD-10-CM

## 2024-07-10 LAB
ALBUMIN SERPL-MCNC: 3.8 G/DL (ref 3.4–5)
ALBUMIN/GLOB SERPL: 1.3 (ref 0.8–1.7)
ALP SERPL-CCNC: 61 U/L (ref 45–117)
ALT SERPL-CCNC: 18 U/L (ref 13–56)
AST SERPL W P-5'-P-CCNC: 17 U/L (ref 10–38)
BILIRUB DIRECT SERPL-MCNC: 0.2 MG/DL (ref 0–0.2)
BILIRUB SERPL-MCNC: 1.1 MG/DL (ref 0.2–1)
CHOLEST SERPL-MCNC: 113 MG/DL
GLOBULIN SER CALC-MCNC: 3 G/DL (ref 2–4)
HDLC SERPL-MCNC: 32 MG/DL (ref 40–60)
HDLC SERPL: 3.5 (ref 0–5)
LDLC SERPL CALC-MCNC: 66 MG/DL (ref 0–100)
LIPID PANEL: ABNORMAL
PROT SERPL-MCNC: 6.8 G/DL (ref 6.4–8.2)
TRIGL SERPL-MCNC: 75 MG/DL
VLDLC SERPL CALC-MCNC: 15 MG/DL

## 2024-07-10 PROCEDURE — 80061 LIPID PANEL: CPT

## 2024-07-10 PROCEDURE — 80076 HEPATIC FUNCTION PANEL: CPT

## 2024-07-10 PROCEDURE — 36415 COLL VENOUS BLD VENIPUNCTURE: CPT

## 2024-07-14 DIAGNOSIS — E03.9 HYPOTHYROIDISM, UNSPECIFIED TYPE: ICD-10-CM

## 2024-07-15 RX ORDER — LEVOTHYROXINE SODIUM 0.07 MG/1
TABLET ORAL
Qty: 30 TABLET | Refills: 0 | Status: SHIPPED | OUTPATIENT
Start: 2024-07-15

## 2024-07-18 ENCOUNTER — COMMUNITY OUTREACH (OUTPATIENT)
Facility: CLINIC | Age: 69
End: 2024-07-18

## 2024-07-26 ASSESSMENT — ENCOUNTER SYMPTOMS: RESPIRATORY NEGATIVE: 1

## 2024-07-26 NOTE — PROGRESS NOTES
Chief Complaint   Patient presents with    Urinary Tract Infection     UTI symptoms   States she is having bladder problems, having incontinence and bladder retention, had a bladder tack in 2007. States she feels like she is retaining urine. States she is having bladder and right sided pain. States she is sometimes peeing a lot and then other times can not make urine. She is on Augmenting for infection in her nose from ENT.     \"Have you been to the ER, urgent care clinic since your last visit?  Hospitalized since your last visit?\"    NO    “Have you seen or consulted any other health care providers outside of [facility] since your last visit?”    NO            
daily as needed (hand arthritis/pain)     estrogens (conjugated) 0.625 MG tablet  Commonly known as: PREMARIN     estrogens conjugated 0.625 MG/GM Crea vaginal cream  Commonly known as: PREMARIN     gabapentin 600 MG tablet  Commonly known as: NEURONTIN     levothyroxine 75 MCG tablet  Commonly known as: SYNTHROID  TAKE 1 TABLET BY MOUTH ONCE DAILY FOR 30 DAYS     lidocaine 4 % external patch     meloxicam 15 MG tablet  Commonly known as: MOBIC     omeprazole 20 MG delayed release capsule  Commonly known as: PRILOSEC     ondansetron 4 MG tablet  Commonly known as: ZOFRAN     polyethylene glycol 17 GM/SCOOP powder  Commonly known as: GLYCOLAX     PROBIOTIC ACIDOPHILUS PO     rizatriptan 10 MG tablet  Commonly known as: MAXALT              ADINA Franz - NP                          Disclaimer:    I have discussed the diagnosis with the patient and the intended plan as seen above.The patient understands our medical plan. The risks, benefits and significant side effects of all medications have been reviewed. Anticipated time course and progression of condition reviewed. All questions have been addressed.  She received an after visit summary, with information reviewed, and questions answered.      Where appropriate, she is instructed to call the clinic if she has not been notified either by phone or [facility] with the results of her tests or with an appointment plan for any referrals within 1 week(s). The patient  is to call if her condition worsens or fails to improve or if significant side effects are experienced.       ADINA Franz NP

## 2024-08-09 ENCOUNTER — HOSPITAL ENCOUNTER (OUTPATIENT)
Age: 69
Setting detail: SPECIMEN
End: 2024-08-09
Payer: MEDICARE

## 2024-08-09 ENCOUNTER — OFFICE VISIT (OUTPATIENT)
Facility: CLINIC | Age: 69
End: 2024-08-09
Payer: MEDICARE

## 2024-08-09 VITALS
TEMPERATURE: 97.8 F | BODY MASS INDEX: 26.09 KG/M2 | RESPIRATION RATE: 18 BRPM | HEART RATE: 64 BPM | DIASTOLIC BLOOD PRESSURE: 61 MMHG | WEIGHT: 152.8 LBS | HEIGHT: 64 IN | OXYGEN SATURATION: 96 % | SYSTOLIC BLOOD PRESSURE: 116 MMHG

## 2024-08-09 DIAGNOSIS — G47.33 OSA (OBSTRUCTIVE SLEEP APNEA): ICD-10-CM

## 2024-08-09 DIAGNOSIS — I20.89 ATYPICAL ANGINA (HCC): ICD-10-CM

## 2024-08-09 DIAGNOSIS — M19.90 ARTHRITIS: ICD-10-CM

## 2024-08-09 DIAGNOSIS — I70.0 AORTIC ATHEROSCLEROSIS (HCC): ICD-10-CM

## 2024-08-09 DIAGNOSIS — J45.20 MILD INTERMITTENT ASTHMA WITHOUT COMPLICATION: ICD-10-CM

## 2024-08-09 DIAGNOSIS — R39.9 LOWER URINARY TRACT SYMPTOMS (LUTS): Primary | ICD-10-CM

## 2024-08-09 DIAGNOSIS — Z12.31 ENCOUNTER FOR SCREENING MAMMOGRAM FOR MALIGNANT NEOPLASM OF BREAST: ICD-10-CM

## 2024-08-09 DIAGNOSIS — I34.0 NONRHEUMATIC MITRAL VALVE REGURGITATION: ICD-10-CM

## 2024-08-09 DIAGNOSIS — Z12.11 SCREEN FOR COLON CANCER: ICD-10-CM

## 2024-08-09 DIAGNOSIS — R51.9 CHRONIC NONINTRACTABLE HEADACHE, UNSPECIFIED HEADACHE TYPE: ICD-10-CM

## 2024-08-09 DIAGNOSIS — E03.9 HYPOTHYROIDISM, UNSPECIFIED TYPE: ICD-10-CM

## 2024-08-09 DIAGNOSIS — G89.29 CHRONIC NONINTRACTABLE HEADACHE, UNSPECIFIED HEADACHE TYPE: ICD-10-CM

## 2024-08-09 LAB
BILIRUBIN, URINE, POC: NEGATIVE
BLOOD URINE, POC: NORMAL
GLUCOSE URINE, POC: NEGATIVE
KETONES, URINE, POC: NEGATIVE
LEUKOCYTE ESTERASE, URINE, POC: NEGATIVE
NITRITE, URINE, POC: NEGATIVE
PH, URINE, POC: 7 (ref 4.6–8)
PROTEIN,URINE, POC: NEGATIVE
SPECIFIC GRAVITY, URINE, POC: 1.01 (ref 1–1.03)
TSH SERPL DL<=0.05 MIU/L-ACNC: 0.73 UIU/ML (ref 0.36–3.74)
URINALYSIS CLARITY, POC: CLEAR
URINALYSIS COLOR, POC: YELLOW
UROBILINOGEN, POC: NORMAL

## 2024-08-09 PROCEDURE — 81003 URINALYSIS AUTO W/O SCOPE: CPT | Performed by: FAMILY MEDICINE

## 2024-08-09 PROCEDURE — 36415 COLL VENOUS BLD VENIPUNCTURE: CPT | Performed by: FAMILY MEDICINE

## 2024-08-09 PROCEDURE — 84443 ASSAY THYROID STIM HORMONE: CPT

## 2024-08-09 PROCEDURE — 1123F ACP DISCUSS/DSCN MKR DOCD: CPT | Performed by: FAMILY MEDICINE

## 2024-08-09 PROCEDURE — 99214 OFFICE O/P EST MOD 30 MIN: CPT | Performed by: FAMILY MEDICINE

## 2024-08-09 RX ORDER — VALACYCLOVIR HYDROCHLORIDE 1 G/1
1000 TABLET, FILM COATED ORAL 2 TIMES DAILY
COMMUNITY

## 2024-08-09 ASSESSMENT — PATIENT HEALTH QUESTIONNAIRE - PHQ9
SUM OF ALL RESPONSES TO PHQ QUESTIONS 1-9: 0
2. FEELING DOWN, DEPRESSED OR HOPELESS: NOT AT ALL
1. LITTLE INTEREST OR PLEASURE IN DOING THINGS: NOT AT ALL
SUM OF ALL RESPONSES TO PHQ QUESTIONS 1-9: 0
SUM OF ALL RESPONSES TO PHQ9 QUESTIONS 1 & 2: 0

## 2024-08-09 NOTE — PROGRESS NOTES
Chief Complaint   Patient presents with    Follow-up     Chronic disease       \"Have you been to the ER, urgent care clinic since your last visit?  Hospitalized since your last visit?\"    NO    “Have you seen or consulted any other health care providers outside of [facility] since your last visit?”    NO       Have you had a mammogram?”   NO    Verbal order from Temi Lincoln MD to order labs/sign and draw them in office    Labs were drawn and sent to [place] by Sujata Blanchard LPN:    The following tubes were sent:    0 Lavendar, 0 Red, 1 SST, 1 Urine    Draw site right antecubital.  Patient tolerated draw with no distress.            
Take 17 g by mouth 2 times daily, Disp: , Rfl:     Allergies   Allergen Reactions    Acetaminophen-Codeine Nausea And Vomiting    Amitriptyline Other (See Comments)    Meperidine Other (See Comments)    Sulfamethoxazole-Trimethoprim Hives    Trazodone Other (See Comments)     Opposite reaction- kept her up at night  Opposite reaction- kept her up at night      Meperidine Hcl Nausea And Vomiting    Oxycodone-Acetaminophen Nausea And Vomiting       /61   Pulse 64   Temp 97.8 °F (36.6 °C) (Temporal)   Resp 18   Ht 1.626 m (5' 4\")   Wt 69.3 kg (152 lb 12.8 oz)   SpO2 96%   BMI 26.23 kg/m²     Physical Exam  Constitutional:       General: She is not in acute distress.     Appearance: She is not ill-appearing.   HENT:      Right Ear: Tympanic membrane normal.      Left Ear: Tympanic membrane normal.   Cardiovascular:      Rate and Rhythm: Normal rate and regular rhythm.      Heart sounds: Normal heart sounds.   Pulmonary:      Effort: Pulmonary effort is normal.      Breath sounds: Normal breath sounds.   Abdominal:      General: Bowel sounds are normal.      Palpations: Abdomen is soft.   Neurological:      Mental Status: She is alert. Mental status is at baseline.   Psychiatric:         Mood and Affect: Mood normal.       Lab Results   Component Value Date/Time    WBC 6.7 06/25/2024 10:12 AM    HGB 12.8 06/25/2024 10:12 AM    HCT 38.3 06/25/2024 10:12 AM     06/25/2024 10:12 AM    MCV 94.6 06/25/2024 10:12 AM     Lab Results   Component Value Date/Time     06/25/2024 10:12 AM    K 3.7 06/25/2024 10:12 AM     06/25/2024 10:12 AM    CO2 26 06/25/2024 10:12 AM    BUN 14 06/25/2024 10:12 AM    GFRAA >60 06/23/2022 07:25 AM    GLOB 3.0 07/10/2024 07:14 AM    ALT 18 07/10/2024 07:14 AM    AST 17 07/10/2024 07:14 AM     Lab Results   Component Value Date/Time    CHOL 113 07/10/2024 07:14 AM    HDL 32 07/10/2024 07:14 AM    LDL 66 07/10/2024 07:14 AM    .4 12/01/2023 09:25 AM    VLDL 15

## 2024-08-12 PROBLEM — J45.20 MILD INTERMITTENT ASTHMA WITHOUT COMPLICATION: Status: ACTIVE | Noted: 2024-08-12

## 2024-08-12 PROBLEM — G89.29 CHRONIC NONINTRACTABLE HEADACHE: Status: ACTIVE | Noted: 2024-08-12

## 2024-08-12 PROBLEM — R51.9 CHRONIC NONINTRACTABLE HEADACHE: Status: ACTIVE | Noted: 2024-08-12

## 2024-08-13 ENCOUNTER — HOSPITAL ENCOUNTER (OUTPATIENT)
Age: 69
Setting detail: SPECIMEN
Discharge: HOME | End: 2024-08-16
Payer: MEDICARE

## 2024-08-13 PROCEDURE — 87086 URINE CULTURE/COLONY COUNT: CPT

## 2024-08-14 LAB
BACTERIA SPEC CULT: NORMAL
Lab: NORMAL

## 2024-08-15 ENCOUNTER — TELEMEDICINE (OUTPATIENT)
Facility: CLINIC | Age: 69
End: 2024-08-15

## 2024-08-15 DIAGNOSIS — G89.29 CHRONIC NONINTRACTABLE HEADACHE, UNSPECIFIED HEADACHE TYPE: ICD-10-CM

## 2024-08-15 DIAGNOSIS — E03.9 HYPOTHYROIDISM, UNSPECIFIED TYPE: Primary | ICD-10-CM

## 2024-08-15 DIAGNOSIS — R39.9 LOWER URINARY TRACT SYMPTOMS (LUTS): ICD-10-CM

## 2024-08-15 DIAGNOSIS — M13.0 POLYARTHRITIS: ICD-10-CM

## 2024-08-15 DIAGNOSIS — R51.9 CHRONIC NONINTRACTABLE HEADACHE, UNSPECIFIED HEADACHE TYPE: ICD-10-CM

## 2024-08-15 RX ORDER — LEVOTHYROXINE SODIUM 0.07 MG/1
TABLET ORAL
Qty: 90 TABLET | Refills: 1 | Status: SHIPPED | OUTPATIENT
Start: 2024-08-15

## 2024-08-15 ASSESSMENT — PATIENT HEALTH QUESTIONNAIRE - PHQ9
SUM OF ALL RESPONSES TO PHQ QUESTIONS 1-9: 0
2. FEELING DOWN, DEPRESSED OR HOPELESS: NOT AT ALL
SUM OF ALL RESPONSES TO PHQ QUESTIONS 1-9: 0
1. LITTLE INTEREST OR PLEASURE IN DOING THINGS: NOT AT ALL
SUM OF ALL RESPONSES TO PHQ9 QUESTIONS 1 & 2: 0
SUM OF ALL RESPONSES TO PHQ QUESTIONS 1-9: 0
SUM OF ALL RESPONSES TO PHQ QUESTIONS 1-9: 0

## 2024-08-15 NOTE — PROGRESS NOTES
Indu Castro (: 1955) is a 68 y.o. female here for evaluation of the following chief concern(s):  Subacute and chronic condition management     The services today are being conducted using telemedicine which Indu Castro has consented to at the time of scheduling.      ASSESSMENT/PLAN:  1. Hypothyroidism, unspecified type  -     levothyroxine (SYNTHROID) 75 MCG tablet; Take 1 tablet by mouth once daily, Disp-90 tablet, R-1Normal  2. Chronic nonintractable headache, unspecified headache type  3. Lower urinary tract symptoms (LUTS)  4. Polyarthritis    Mrs. Castro appears medically stable.    She plans to call for scheduling w/ GYN for routine care.  Pt call let us know if she has not heard from Sleep Medicine or GI in the next 1 week- referrals placed last visit.    Continue current care plan.      Return in about 4 months (around 12/15/2024) for follow-up chronic conditions or sooner if needed- extended .    Indu Castro agrees with plan as above and has no additional questions at this time.       SUBJECTIVE/OBJECTIVE:    24 TSH 0.73; full adherence w/ Levothyroxine   24 Ucx no growth     She has her mammogram tomorrow afternoon.     She has had a bad headache today- improving, working where as she would have been able to rest.    After our last visit, she got OTC treatment for yeast infection which seems to have helped her symptoms.    Hx;  She shares that since last visit she had an infection in her nose and a UTI.  She completed antibiotics; Augmentin and ?something else from GYN.  Symptoms are improved however she reports persistent urinary symptoms since before her visit w/ TONYA Burdick 2024; hesitancy, frequency.  No dysuria.  No fever/chills.     EOC w/ Urology in 2024    Persistent, intermittent headaches.  Hx;  Pt follows w/ Neurology for headaches that started ~2 years ago.  She usually gets a headache twice a month.  Lately ~2-3 times per week since having COVID.  Hx

## 2024-08-15 NOTE — PROGRESS NOTES
Patient states she had to take a migraine pill this morning.     Chief Complaint   Patient presents with    Follow-up       \"Have you been to the ER, urgent care clinic since your last visit?  Hospitalized since your last visit?\"    NO    “Have you seen or consulted any other health care providers outside of [facility] since your last visit?”    NO

## 2024-08-16 ENCOUNTER — HOSPITAL ENCOUNTER (OUTPATIENT)
Age: 69
End: 2024-08-16
Attending: FAMILY MEDICINE
Payer: MEDICARE

## 2024-08-16 ENCOUNTER — TELEPHONE (OUTPATIENT)
Age: 69
End: 2024-08-16

## 2024-08-16 VITALS — BODY MASS INDEX: 25.95 KG/M2 | HEIGHT: 64 IN | WEIGHT: 152 LBS

## 2024-08-16 DIAGNOSIS — Z12.31 ENCOUNTER FOR SCREENING MAMMOGRAM FOR MALIGNANT NEOPLASM OF BREAST: ICD-10-CM

## 2024-08-16 PROCEDURE — 77063 BREAST TOMOSYNTHESIS BI: CPT

## 2024-08-16 NOTE — TELEPHONE ENCOUNTER
Referral for screening colonoscopy. Please review and advise.      Referral  Referral # [id number]  Referral Information    Referral # Creation Date Referral Status Status Update    [id number] 08/09/2024 Pending Review 08/16/2024: Status History     Status Reason Referral Type Referral Reasons Referral Class   Other Eval and Treat Specialty Services Required Internal     To Specialty To Provider To Location/Place of Service To Department   Gastroenterology Bismark Adamson MD none [facility] - GASTROENTEROLOGY     To Vendor Referred By By Location/Place of Service By Department   none Temi Lincoln MD [facility] INT MED     Priority Start Date Expiration Date Referral Entered By   Routine 08/09/2024 08/09/2025 Sujata Blanchard LPN     Visits Requested Visits Authorized Visits Completed Visits Scheduled   2 2       Coverages    Payer Plan Auth. Required? Covered? Member # Authorized From Expires Auth # Precert. # Comment   UHC MEDICARE UHC AARP MEDICARE ADVANTAGE -- Covered [id number] 6/5/2023 -- -- -- --     Referral Information    Referral # Creation Date Referral Status Status Update    [id number] 08/09/2024 Pending Review 08/16/2024: Status History     Status Reason Referral Type Referral Reasons Referral Class   Other Eval and Treat Specialty Services Required Internal     To Specialty To Provider To Location/Place of Service To Department   Gastroenterology Bismark Adamson MD none [facility] - GASTROENTEROLOGY     To Vendor Referred By By Location/Place of Service By Department   none Temi Lincoln MD [facility] INT MED     Priority Start Date Expiration Date Referral Entered By   Routine 08/09/2024 08/09/2025 Sujata Blanchard LPN     Visits Requested Visits Authorized Visits Completed Visits Scheduled   2 2       Procedure Information    Service Details  Procedure Modifiers Provider Requested

## 2024-08-19 ENCOUNTER — PREP FOR PROCEDURE (OUTPATIENT)
Age: 69
End: 2024-08-19

## 2024-08-19 ENCOUNTER — TELEPHONE (OUTPATIENT)
Age: 69
End: 2024-08-19

## 2024-08-19 ENCOUNTER — SCHEDULED TELEPHONE ENCOUNTER (OUTPATIENT)
Age: 69
End: 2024-08-19

## 2024-08-19 DIAGNOSIS — Z12.11 ENCOUNTER FOR SCREENING COLONOSCOPY: Primary | ICD-10-CM

## 2024-08-19 RX ORDER — POLYETHYLENE GLYCOL 3350, SODIUM SULFATE ANHYDROUS, SODIUM BICARBONATE, SODIUM CHLORIDE, POTASSIUM CHLORIDE 236; 22.74; 6.74; 5.86; 2.97 G/4L; G/4L; G/4L; G/4L; G/4L
4000 POWDER, FOR SOLUTION ORAL DAILY
Qty: 4000 ML | Refills: 0 | Status: SHIPPED | OUTPATIENT
Start: 2024-08-19 | End: 2024-08-20

## 2024-08-19 NOTE — TELEPHONE ENCOUNTER
Patient has upcoming procedure with Dr. Adamson. Verbal orders from MD to send in prep. Bfleonardo branham

## 2024-08-26 ENCOUNTER — OFFICE VISIT (OUTPATIENT)
Age: 69
End: 2024-08-26
Payer: MEDICARE

## 2024-08-26 VITALS
HEIGHT: 64 IN | DIASTOLIC BLOOD PRESSURE: 72 MMHG | WEIGHT: 150 LBS | BODY MASS INDEX: 25.61 KG/M2 | TEMPERATURE: 97.4 F | HEART RATE: 64 BPM | OXYGEN SATURATION: 97 % | SYSTOLIC BLOOD PRESSURE: 136 MMHG | RESPIRATION RATE: 16 BRPM

## 2024-08-26 DIAGNOSIS — J45.909 MILD ASTHMA WITHOUT COMPLICATION, UNSPECIFIED WHETHER PERSISTENT: ICD-10-CM

## 2024-08-26 DIAGNOSIS — R91.8 MULTIPLE PULMONARY NODULES: Primary | ICD-10-CM

## 2024-08-26 PROBLEM — M54.41 LUMBAGO WITH SCIATICA, RIGHT SIDE: Status: ACTIVE | Noted: 2017-08-24

## 2024-08-26 PROBLEM — G89.29 CHRONIC NECK PAIN: Status: ACTIVE | Noted: 2017-06-15

## 2024-08-26 PROBLEM — M25.551 RIGHT HIP PAIN: Status: ACTIVE | Noted: 2023-05-26

## 2024-08-26 PROBLEM — M48.02 NEURAL FORAMINAL STENOSIS OF CERVICAL SPINE: Status: ACTIVE | Noted: 2017-01-31

## 2024-08-26 PROBLEM — C44.310 BCC (BASAL CELL CARCINOMA), FACE: Status: ACTIVE | Noted: 2017-05-24

## 2024-08-26 PROBLEM — M53.3 SACROILIAC JOINT DYSFUNCTION: Status: ACTIVE | Noted: 2023-06-29

## 2024-08-26 PROBLEM — N60.12 FIBROCYSTIC CHANGES OF LEFT BREAST: Status: ACTIVE | Noted: 2017-09-27

## 2024-08-26 PROBLEM — M51.369 DEGENERATIVE DISC DISEASE, LUMBAR: Status: ACTIVE | Noted: 2023-05-26

## 2024-08-26 PROBLEM — M25.512 CHRONIC PAIN OF BOTH SHOULDERS: Status: ACTIVE | Noted: 2017-06-15

## 2024-08-26 PROBLEM — M51.36 DEGENERATIVE DISC DISEASE, LUMBAR: Status: ACTIVE | Noted: 2023-05-26

## 2024-08-26 PROBLEM — M25.511 CHRONIC PAIN OF BOTH SHOULDERS: Status: ACTIVE | Noted: 2017-06-15

## 2024-08-26 PROBLEM — M54.2 CHRONIC NECK PAIN: Status: ACTIVE | Noted: 2017-06-15

## 2024-08-26 PROBLEM — G89.29 CHRONIC PAIN OF BOTH SHOULDERS: Status: ACTIVE | Noted: 2017-06-15

## 2024-08-26 PROCEDURE — 1123F ACP DISCUSS/DSCN MKR DOCD: CPT | Performed by: INTERNAL MEDICINE

## 2024-08-26 PROCEDURE — 99213 OFFICE O/P EST LOW 20 MIN: CPT | Performed by: INTERNAL MEDICINE

## 2024-08-26 NOTE — PROGRESS NOTES
Indu Csatro presents today for   Chief Complaint   Patient presents with    Asthma     Mild asthma without complication, unspecified whether persistent       Is someone accompanying this pt? No    Is the patient using any DME equipment during OV? No    -DME Company No    Depression Screenin/15/2024     1:00 PM   PHQ-9 Questionaire   Little interest or pleasure in doing things 0   Feeling down, depressed, or hopeless 0   PHQ-9 Total Score 0       Learning Assessment:    Failed to redirect to the Timeline version of the [employer] SmartLink.    Abuse Screening:         No data to display                Fall Risk    Failed to redirect to the Timeline version of the [employer] SmartLink.    Coordination of Care:    1. Have you been to the ER, urgent care clinic since your last visit? Hospitalized since your last visit? No    2. Have you seen or consulted any other health care providers outside of the [facility] since your last visit? Include any pap smears or colon screening. No    Medication list has been update per patient.

## 2024-08-26 NOTE — PATIENT INSTRUCTIONS
What is the plan?  -Start on Qvar - rinse mouth after each use.  -Continue Albuterol rescue inhaler only as needed  -Increase activity as much as able   -

## 2024-08-26 NOTE — PROGRESS NOTES
Insecurity (12/1/2023)    Hunger Vital Sign     Worried About Running Out of Food in the Last Year: Never true     Ran Out of Food in the Last Year: Never true   Transportation Needs: Unknown (12/1/2023)    PRAPARE - Transportation     Lack of Transportation (Non-Medical): No   Physical Activity: Inactive (2/27/2024)    Exercise Vital Sign     Days of Exercise per Week: 0 days     Minutes of Exercise per Session: 0 min   Housing Stability: Unknown (12/1/2023)    Housing Stability Vital Sign     Unstable Housing in the Last Year: No        Family History   Problem Relation Age of Onset    Other Mother         irregular heart beat    Breast Cancer Mother 58        and had gone to her brain, did not go to the dr. valentine    Stroke Father 59    Heart Failure Father     Heart Surgery Sister 68    Coronary Art Dis Sister     Cancer Sister     Stroke Sister 65    Diabetes Other         Allergies   Allergen Reactions    Acetaminophen-Codeine Nausea And Vomiting    Amitriptyline Other (See Comments)    Meperidine Other (See Comments)    Sulfamethoxazole-Trimethoprim Hives    Trazodone Other (See Comments)     Opposite reaction- kept her up at night  Opposite reaction- kept her up at night      Meperidine Hcl Nausea And Vomiting    Oxycodone-Acetaminophen Nausea And Vomiting        Current Outpatient Medications   Medication Sig Dispense Refill    beclomethasone (QVAR REDIHALER) 40 MCG/ACT AERB inhaler Inhale 1 puff into the lungs in the morning and 1 puff in the evening. 1 each 5    levothyroxine (SYNTHROID) 75 MCG tablet Take 1 tablet by mouth once daily 90 tablet 1    valACYclovir (VALTREX) 1 g tablet Take 1 tablet by mouth 2 times daily      atorvastatin (LIPITOR) 20 MG tablet Take 1 tablet by mouth daily 90 tablet 3    albuterol sulfate HFA (PROVENTIL HFA) 108 (90 Base) MCG/ACT inhaler Inhale 2 puffs into the lungs every 6 hours as needed for Wheezing 18 g 3    diclofenac sodium (VOLTAREN) 1 % GEL Apply 2 g topically 3  consistent with benign nodules. Consider  lung cancer screening exam in 12 months if the patient qualifies.      CT CHEST (8/17/21):  FINDINGS:     LUNGS: No significant change of bilateral subpleural nodular densities.  Right  middle lobe scarring.     PLEURA: Normal.     AIRWAY: Normal.     MEDIASTINUM: Normal heart size.  No pericardial effusion.  Great vessels  unremarkable.  The thyroid is grossly within normal limits.  Esophagus appears  unremarkable.     LYMPH NODES: No enlarged lymph nodes.     UPPER ABDOMEN: Left renal cyst is incompletely imaged. Surgical clips in the  gallbladder.     OTHER: Nondisplaced comminuted sternal fracture is noted.  There is  dextrocurvature of the thoracic spine. Left anterior third rib minimally  displaced fracture.  ______________     IMPRESSION:   1. Acute/subacute sternal and left third rib fractures.           Patient Active Problem List   Diagnosis    Hematuria    Non-rheumatic mitral regurgitation    Abnormal EKG    UTI (urinary tract infection)    Osteoporosis    Kidney stones    Atypical angina (HCC)    Aortic atherosclerosis (HCC)    Chest pain    Pure hypercholesterolemia, unspecified    Brain tumor (benign) (HCC)    Mild intermittent asthma without complication    Chronic nonintractable headache    Encounter for screening colonoscopy    Atrophic vaginitis    BCC (basal cell carcinoma), face    Chronic neck pain    Chronic pain of both shoulders    Degenerative disc disease, lumbar    Diarrhea    Epigastric pain    Fibrocystic changes of left breast    GERD (gastroesophageal reflux disease)    Hematuria, microscopic    Hypothyroidism    Insomnia    Lower abdominal pain    Lumbago with sciatica, right side    Nausea vomiting and diarrhea    Neural foraminal stenosis of cervical spine    Osteoarthritis of finger of left hand    Right flank pain    Right hip pain    Sacroiliac joint dysfunction    Urinary incontinence    Vestibulitis, vulvar    Vulvodynia, unspecified

## 2024-08-30 ENCOUNTER — TELEPHONE (OUTPATIENT)
Age: 69
End: 2024-08-30

## 2024-09-09 RX ORDER — FLUTICASONE PROPIONATE AND SALMETEROL 100; 50 UG/1; UG/1
1 POWDER RESPIRATORY (INHALATION) EVERY 12 HOURS
Qty: 1 EACH | Refills: 5 | Status: SHIPPED | OUTPATIENT
Start: 2024-09-09

## 2024-09-12 ENCOUNTER — ANESTHESIA EVENT (OUTPATIENT)
Age: 69
End: 2024-09-12
Payer: MEDICARE

## 2024-09-12 RX ORDER — SODIUM CHLORIDE 9 MG/ML
INJECTION, SOLUTION INTRAVENOUS PRN
Status: CANCELLED | OUTPATIENT
Start: 2024-09-12

## 2024-09-12 RX ORDER — SODIUM CHLORIDE 0.9 % (FLUSH) 0.9 %
5-40 SYRINGE (ML) INJECTION EVERY 12 HOURS SCHEDULED
Status: CANCELLED | OUTPATIENT
Start: 2024-09-12

## 2024-09-18 PROBLEM — Z12.11 ENCOUNTER FOR SCREENING COLONOSCOPY: Status: RESOLVED | Noted: 2024-08-19 | Resolved: 2024-09-18

## 2024-09-19 PROBLEM — Z12.11 ENCOUNTER FOR SCREENING COLONOSCOPY: Status: ACTIVE | Noted: 2024-08-19

## 2024-09-22 RX ORDER — SODIUM CHLORIDE, SODIUM LACTATE, POTASSIUM CHLORIDE, CALCIUM CHLORIDE 600; 310; 30; 20 MG/100ML; MG/100ML; MG/100ML; MG/100ML
INJECTION, SOLUTION INTRAVENOUS CONTINUOUS
Status: CANCELLED | OUTPATIENT
Start: 2024-09-22

## 2024-09-23 NOTE — H&P
Open access updated H&P.      Brief history: The patient is female White (non-) 68 y.o. who was referred for screening colonoscopy.  Review of the records showed that they had few if any health problems, excessive obesity, or significant medications that would require office evaluation prior to colonoscopy for colon cancer screening.  After review of their chart, contact was made with the patient in discussion and literature sent regarding the procedure and the bowel preparation.  They are here now for their procedure.      Past medical and surgical history:   Past Medical History:   Diagnosis Date    Arthritis     Asthma     Brain tumor (benign) (HCC)     Cancer of skin, face     Endocrine disorder     Endometriosis     Heart disease (organic)     Hematuria     Hydronephrosis     Hypercholesteremia 10/17/2019    Kidney stones     Leaky heart valve     Menopause     Osteoarthritis     Osteoporosis     Pulmonary nodules     Thyroid disease     UTI (urinary tract infection)       Past Surgical History:   Procedure Laterality Date    APPENDECTOMY      BLADDER SUSPENSION      BREAST BIOPSY Left     Benign done [facility] w/ Dr NORRIS Newby    BREAST LUMPECTOMY      CHOLECYSTECTOMY      HYSTERECTOMY (CERVIX STATUS UNKNOWN)      for endometriosis, VERONIKA at [facility] w/ Dr LILIANE Reyes    OVARY REMOVAL          Allergies:    Allergies   Allergen Reactions    Acetaminophen-Codeine Nausea And Vomiting    Amitriptyline Other (See Comments)    Meperidine Other (See Comments)    Sulfamethoxazole-Trimethoprim Hives    Trazodone Other (See Comments)     Opposite reaction- kept her up at night  Opposite reaction- kept her up at night      Meperidine Hcl Nausea And Vomiting    Oxycodone-Acetaminophen Nausea And Vomiting        Medications:  No current facility-administered medications for this encounter.    Current Outpatient Medications:     fluticasone-salmeterol (WIXELA INHUB) 100-50 MCG/ACT AEPB diskus inhaler, Inhale 1 puff into the lungs in

## 2024-09-24 ENCOUNTER — ANESTHESIA (OUTPATIENT)
Age: 69
End: 2024-09-24
Payer: MEDICARE

## 2024-09-24 ENCOUNTER — HOSPITAL ENCOUNTER (OUTPATIENT)
Age: 69
Setting detail: OUTPATIENT SURGERY
Discharge: HOME | End: 2024-09-24
Attending: INTERNAL MEDICINE | Admitting: INTERNAL MEDICINE
Payer: MEDICARE

## 2024-09-24 VITALS
RESPIRATION RATE: 18 BRPM | OXYGEN SATURATION: 99 % | DIASTOLIC BLOOD PRESSURE: 84 MMHG | SYSTOLIC BLOOD PRESSURE: 105 MMHG | BODY MASS INDEX: 26.29 KG/M2 | HEART RATE: 75 BPM | TEMPERATURE: 97.6 F | HEIGHT: 64 IN | WEIGHT: 154 LBS

## 2024-09-24 DIAGNOSIS — Z12.11 ENCOUNTER FOR SCREENING COLONOSCOPY: ICD-10-CM

## 2024-09-24 PROBLEM — K57.30 DIVERTICULOSIS LARGE INTESTINE W/O PERFORATION OR ABSCESS W/O BLEEDING: Status: ACTIVE | Noted: 2024-09-24

## 2024-09-24 PROBLEM — K64.0 GRADE I INTERNAL HEMORRHOIDS: Status: ACTIVE | Noted: 2024-09-24

## 2024-09-24 PROCEDURE — 6370000000 HC RX 637 (ALT 250 FOR IP)

## 2024-09-24 PROCEDURE — 6360000002 HC RX W HCPCS

## 2024-09-24 PROCEDURE — 3600007501: Performed by: INTERNAL MEDICINE

## 2024-09-24 PROCEDURE — 7100000011 HC PHASE II RECOVERY - ADDTL 15 MIN: Performed by: INTERNAL MEDICINE

## 2024-09-24 PROCEDURE — 2709999900 HC NON-CHARGEABLE SUPPLY: Performed by: INTERNAL MEDICINE

## 2024-09-24 PROCEDURE — 7100000010 HC PHASE II RECOVERY - FIRST 15 MIN: Performed by: INTERNAL MEDICINE

## 2024-09-24 PROCEDURE — G0121 COLON CA SCRN NOT HI RSK IND: HCPCS | Performed by: INTERNAL MEDICINE

## 2024-09-24 PROCEDURE — 3700000001 HC ADD 15 MINUTES (ANESTHESIA): Performed by: INTERNAL MEDICINE

## 2024-09-24 PROCEDURE — 3700000000 HC ANESTHESIA ATTENDED CARE: Performed by: INTERNAL MEDICINE

## 2024-09-24 PROCEDURE — 2580000003 HC RX 258: Performed by: INTERNAL MEDICINE

## 2024-09-24 PROCEDURE — 3600007511: Performed by: INTERNAL MEDICINE

## 2024-09-24 PROCEDURE — 2500000003 HC RX 250 WO HCPCS

## 2024-09-24 RX ORDER — SODIUM CHLORIDE, SODIUM LACTATE, POTASSIUM CHLORIDE, CALCIUM CHLORIDE 600; 310; 30; 20 MG/100ML; MG/100ML; MG/100ML; MG/100ML
INJECTION, SOLUTION INTRAVENOUS CONTINUOUS
Status: DISCONTINUED | OUTPATIENT
Start: 2024-09-24 | End: 2024-09-24 | Stop reason: HOSPADM

## 2024-09-24 RX ORDER — ALBUTEROL SULFATE 90 UG/1
INHALANT RESPIRATORY (INHALATION)
Status: DISCONTINUED | OUTPATIENT
Start: 2024-09-24 | End: 2024-09-25 | Stop reason: SDUPTHER

## 2024-09-24 RX ORDER — ONDANSETRON 2 MG/ML
INJECTION INTRAMUSCULAR; INTRAVENOUS
Status: DISCONTINUED | OUTPATIENT
Start: 2024-09-24 | End: 2024-09-25 | Stop reason: SDUPTHER

## 2024-09-24 RX ORDER — SODIUM CHLORIDE 0.9 % (FLUSH) 0.9 %
5-40 SYRINGE (ML) INJECTION PRN
Status: DISCONTINUED | OUTPATIENT
Start: 2024-09-24 | End: 2024-09-24 | Stop reason: HOSPADM

## 2024-09-24 RX ORDER — PROPOFOL 10 MG/ML
INJECTION, EMULSION INTRAVENOUS
Status: DISCONTINUED | OUTPATIENT
Start: 2024-09-24 | End: 2024-09-25 | Stop reason: SDUPTHER

## 2024-09-24 RX ADMIN — LIDOCAINE HYDROCHLORIDE 40 MG: 20 INJECTION, SOLUTION INFILTRATION; PERINEURAL at 11:52

## 2024-09-24 RX ADMIN — PROPOFOL 50 MG: 10 INJECTION, EMULSION INTRAVENOUS at 11:53

## 2024-09-24 RX ADMIN — PROPOFOL 20 MG: 10 INJECTION, EMULSION INTRAVENOUS at 12:21

## 2024-09-24 RX ADMIN — PROPOFOL 20 MG: 10 INJECTION, EMULSION INTRAVENOUS at 12:03

## 2024-09-24 RX ADMIN — PROPOFOL 20 MG: 10 INJECTION, EMULSION INTRAVENOUS at 12:15

## 2024-09-24 RX ADMIN — PROPOFOL 50 MG: 10 INJECTION, EMULSION INTRAVENOUS at 12:23

## 2024-09-24 RX ADMIN — PROPOFOL 20 MG: 10 INJECTION, EMULSION INTRAVENOUS at 12:09

## 2024-09-24 RX ADMIN — PROPOFOL 50 MG: 10 INJECTION, EMULSION INTRAVENOUS at 12:26

## 2024-09-24 RX ADMIN — PROPOFOL 50 MG: 10 INJECTION, EMULSION INTRAVENOUS at 12:29

## 2024-09-24 RX ADMIN — PROPOFOL 20 MG: 10 INJECTION, EMULSION INTRAVENOUS at 12:00

## 2024-09-24 RX ADMIN — ONDANSETRON 4 MG: 2 INJECTION INTRAMUSCULAR; INTRAVENOUS at 11:52

## 2024-09-24 RX ADMIN — SODIUM CHLORIDE, POTASSIUM CHLORIDE, SODIUM LACTATE AND CALCIUM CHLORIDE: 600; 310; 30; 20 INJECTION, SOLUTION INTRAVENOUS at 11:38

## 2024-09-24 RX ADMIN — PROPOFOL 20 MG: 10 INJECTION, EMULSION INTRAVENOUS at 12:13

## 2024-09-24 RX ADMIN — ALBUTEROL SULFATE 2 PUFF: 90 AEROSOL, METERED RESPIRATORY (INHALATION) at 11:45

## 2024-09-24 RX ADMIN — PROPOFOL 20 MG: 10 INJECTION, EMULSION INTRAVENOUS at 12:17

## 2024-09-24 RX ADMIN — PROPOFOL 20 MG: 10 INJECTION, EMULSION INTRAVENOUS at 11:58

## 2024-09-24 RX ADMIN — PROPOFOL 20 MG: 10 INJECTION, EMULSION INTRAVENOUS at 11:56

## 2024-09-24 RX ADMIN — PROPOFOL 20 MG: 10 INJECTION, EMULSION INTRAVENOUS at 12:06

## 2024-09-24 RX ADMIN — PROPOFOL 20 MG: 10 INJECTION, EMULSION INTRAVENOUS at 12:11

## 2024-09-24 RX ADMIN — PROPOFOL 50 MG: 10 INJECTION, EMULSION INTRAVENOUS at 11:52

## 2024-09-24 RX ADMIN — PROPOFOL 20 MG: 10 INJECTION, EMULSION INTRAVENOUS at 12:19

## 2024-09-24 ASSESSMENT — PAIN - FUNCTIONAL ASSESSMENT
PAIN_FUNCTIONAL_ASSESSMENT: NONE - DENIES PAIN
PAIN_FUNCTIONAL_ASSESSMENT: NONE - DENIES PAIN

## 2024-09-24 NOTE — INTERVAL H&P NOTE
Update History & Physical    The patient's History and Physical of September 24, 2024 was reviewed with the patient and I examined the patient. There was no change. The surgical site was confirmed by the patient and me.     Plan: The risks, benefits, expected outcome, and alternative to the recommended procedure have been discussed with the patient. Patient understands and wants to proceed with the procedure.     Electronically signed by Bismark Adamson MD on 9/24/2024 at 11:36 AM

## 2024-09-24 NOTE — OP NOTE
Colonoscopy procedure note    Date of service: 9/24/2024    Type: Screening    Indication for procedure: Colon cancer screening    Anesthesia classification: ASA class 2    Patient history and physical been accomplished and documented.  Patient is assessed and determined to be appropriate candidate for planned procedure and sedation; patient reassessed immediately prior to sedation.      Sedation plan: MAC per anesthesia    Surgical assistant: Not applicable    Airway assessment: Range of motion: Normal, mouth opening, Visual obstruction: No.    UPDATED PREOP EXAM:  Unchanged.    VS: Reviewed  Gen: in NAD  CV: RRR, no murmur  Resp: CTA  Abd: Soft, NTND, +BS  Extrem: No cyanosis or edema  Neuro: Awake and alert    Informed consent obtained: Yes.  The indications, risks including but not limited to bleeding, perforation, infection, death, and potential failure to visual areas are diagnosed neoplasia, alternatives and benefits were discussed with the patient prior to the procedure.  Patient identity and procedure was verified, absent was obtained, and is consistent with the consent form found in the patient's records.    PROCEDURE PERFORMED:  COLONOSCOPY  to the cecum with MAC     INSTRUMENT: Olympus colonoscope per nursing notes.    FINDINGS:    External anal lesions: Normal   Rectum: normal.  Rectal sphincter tone was normal.   Retroflexion view: Grade 1 internal hemorrhoids.  Sigmoid: normal except for diverticulosis.  Descending Colon: normal except for diverticulosis.  Transverse Colon: normal   Ascending Colon: normal   Cecum: normal, including the appendiceal orifice and ileocecal valve.    Terminal ileum: not evaluated     Specimens: none     Bowel preparation- adequate to detect small (5mm) polyps or larger.    Estimated blood loss: none   Complications:  none   Cecal withdrawal time: 15 minutes.    Comments: The patient's colon was very tortuous due to severe left-sided diverticulosis, thin body habitus and

## 2024-09-24 NOTE — DISCHARGE INSTRUCTIONS
Impression:  Left-sided diverticulosis.  Grade 1 internal hemorrhoids.  Tortuous colon.     Recommendations:  Repeat colonoscopy in 10  years for screening.  Follow up as needed.

## 2024-10-19 PROBLEM — Z12.11 ENCOUNTER FOR SCREENING COLONOSCOPY: Status: RESOLVED | Noted: 2024-08-19 | Resolved: 2024-10-19

## 2024-11-18 ENCOUNTER — OFFICE VISIT (OUTPATIENT)
Facility: CLINIC | Age: 69
End: 2024-11-18
Payer: MEDICARE

## 2024-11-18 ENCOUNTER — HOSPITAL ENCOUNTER (OUTPATIENT)
Age: 69
Setting detail: SPECIMEN
Discharge: HOME | End: 2024-11-21
Payer: MEDICARE

## 2024-11-18 VITALS
HEART RATE: 70 BPM | BODY MASS INDEX: 25.88 KG/M2 | WEIGHT: 151.6 LBS | TEMPERATURE: 98.2 F | SYSTOLIC BLOOD PRESSURE: 111 MMHG | DIASTOLIC BLOOD PRESSURE: 57 MMHG | HEIGHT: 64 IN | OXYGEN SATURATION: 95 % | RESPIRATION RATE: 20 BRPM

## 2024-11-18 DIAGNOSIS — M13.0 POLYARTHROPATHY: Primary | ICD-10-CM

## 2024-11-18 DIAGNOSIS — M13.0 POLYARTHROPATHY: ICD-10-CM

## 2024-11-18 DIAGNOSIS — R53.82 CHRONIC FATIGUE: ICD-10-CM

## 2024-11-18 DIAGNOSIS — E03.9 HYPOTHYROIDISM, UNSPECIFIED TYPE: ICD-10-CM

## 2024-11-18 DIAGNOSIS — R70.0 ELEVATED ERYTHROCYTE SEDIMENTATION RATE: ICD-10-CM

## 2024-11-18 DIAGNOSIS — Z13.1 SCREENING FOR DIABETES MELLITUS: ICD-10-CM

## 2024-11-18 DIAGNOSIS — D22.9 SUSPICIOUS NEVUS: ICD-10-CM

## 2024-11-18 DIAGNOSIS — R17 ELEVATED BILIRUBIN: ICD-10-CM

## 2024-11-18 LAB
ALBUMIN SERPL-MCNC: 3.9 G/DL (ref 3.4–5)
ALBUMIN/GLOB SERPL: 1.4 (ref 0.8–1.7)
ALP SERPL-CCNC: 76 U/L (ref 45–117)
ALT SERPL-CCNC: 24 U/L (ref 13–56)
ANION GAP SERPL CALC-SCNC: 6 MMOL/L (ref 3–18)
AST SERPL W P-5'-P-CCNC: 19 U/L (ref 10–38)
BILIRUB SERPL-MCNC: 0.7 MG/DL (ref 0.2–1)
BUN SERPL-MCNC: 14 MG/DL (ref 7–18)
BUN/CREAT SERPL: 23 (ref 12–20)
CA-I BLD-MCNC: 8.9 MG/DL (ref 8.5–10.1)
CHLORIDE SERPL-SCNC: 111 MMOL/L (ref 100–111)
CO2 SERPL-SCNC: 26 MMOL/L (ref 21–32)
CREAT SERPL-MCNC: 0.62 MG/DL (ref 0.6–1.3)
GLOBULIN SER CALC-MCNC: 2.8 G/DL (ref 2–4)
GLUCOSE SERPL-MCNC: 82 MG/DL (ref 74–99)
POTASSIUM SERPL-SCNC: 3.7 MMOL/L (ref 3.5–5.5)
PROT SERPL-MCNC: 6.7 G/DL (ref 6.4–8.2)
SODIUM SERPL-SCNC: 143 MMOL/L (ref 136–145)
TSH SERPL DL<=0.05 MIU/L-ACNC: 1.58 UIU/ML (ref 0.36–3.74)

## 2024-11-18 PROCEDURE — 86225 DNA ANTIBODY NATIVE: CPT

## 2024-11-18 PROCEDURE — 86038 ANTINUCLEAR ANTIBODIES: CPT

## 2024-11-18 PROCEDURE — 80053 COMPREHEN METABOLIC PANEL: CPT

## 2024-11-18 PROCEDURE — 36415 COLL VENOUS BLD VENIPUNCTURE: CPT | Performed by: FAMILY MEDICINE

## 2024-11-18 PROCEDURE — 1123F ACP DISCUSS/DSCN MKR DOCD: CPT | Performed by: FAMILY MEDICINE

## 2024-11-18 PROCEDURE — 83036 HEMOGLOBIN GLYCOSYLATED A1C: CPT

## 2024-11-18 PROCEDURE — 84443 ASSAY THYROID STIM HORMONE: CPT

## 2024-11-18 PROCEDURE — 86431 RHEUMATOID FACTOR QUANT: CPT

## 2024-11-18 PROCEDURE — 86200 CCP ANTIBODY: CPT

## 2024-11-18 PROCEDURE — 99215 OFFICE O/P EST HI 40 MIN: CPT | Performed by: FAMILY MEDICINE

## 2024-11-18 PROCEDURE — 86235 NUCLEAR ANTIGEN ANTIBODY: CPT

## 2024-11-18 ASSESSMENT — PATIENT HEALTH QUESTIONNAIRE - PHQ9
SUM OF ALL RESPONSES TO PHQ QUESTIONS 1-9: 0
SUM OF ALL RESPONSES TO PHQ QUESTIONS 1-9: 0
SUM OF ALL RESPONSES TO PHQ9 QUESTIONS 1 & 2: 0
1. LITTLE INTEREST OR PLEASURE IN DOING THINGS: NOT AT ALL
2. FEELING DOWN, DEPRESSED OR HOPELESS: NOT AT ALL
SUM OF ALL RESPONSES TO PHQ QUESTIONS 1-9: 0
SUM OF ALL RESPONSES TO PHQ QUESTIONS 1-9: 0

## 2024-11-18 NOTE — PROGRESS NOTES
Indu Castro (: 1955) is a 69 y.o. female here for evaluation of the following chief concern(s):  Chronic condition management     ASSESSMENT/PLAN:  1. Polyarthropathy  -     NURYS, Rflx to 5-Biomarker Profile([facility]); Future  -     Rheumatoid Factor; Future  -     CCP Antibodies, IGG/IGA; Future  -     COLLECTION VENOUS BLOOD,VENIPUNCTURE  -     [facility] - Martir Burdick DO, Hand Surgery, [place] ([facility] [address])  2. Chronic fatigue  -     NURYS, Rflx to 5-Biomarker Profile([facility]); Future  -     Rheumatoid Factor; Future  -     CCP Antibodies, IGG/IGA; Future  -     Comprehensive Metabolic Panel; Future  3. Elevated erythrocyte sedimentation rate  -     NURYS, Rflx to 5-Biomarker Profile([facility]); Future  -     Rheumatoid Factor; Future  -     CCP Antibodies, IGG/IGA; Future  4. Hypothyroidism, unspecified type  -     TSH; Future  5. Elevated bilirubin  -     Comprehensive Metabolic Panel; Future  6. Suspicious nevus  -     External Referral To Dermatology  7. Screening for diabetes mellitus  -     Hemoglobin A1C; Future    Mrs. Castro appears medically stable.    Normotensive.    Appreciate Ortho- hand to help evaluate and treat polyarthropathy and joint deformities causing debility.   Labs to eval for autoimmune etiology.    Second opinion for persistent facial lesions; we discussed to me appear like skin cancer.  Note hx of skin cancer x6 excision on her face in the past; current Dermatology provider has told her repeatedly lesions are c/w herpes and prescribed antivirals w/ no clear resolution though varying severity of lesions over time.    Pt aware to send portal message/call office if not contacted by consultant schedulers within 1 week.      to provide number for Sleep Medicine.     Return in about 2 weeks (around 2024) for follow-up chronic conditions or sooner if needed- extended.    Indu Castro agrees with plan as above and has no additional questions at this time.

## 2024-11-18 NOTE — PROGRESS NOTES
Chief Complaint   Patient presents with    Follow-up     Chronic disease   Patient states she is not sleeping well at all, states she has a very hard time going to sleep.    \"Have you been to the ER, urgent care clinic since your last visit?  Hospitalized since your last visit?\"    NO    “Have you seen or consulted any other health care providers outside our system since your last visit?”    NO      Verbal order from Temi Lincoln MD to order labs/sign and draw them in office    Labs were drawn and sent to [place] by Sujata Blanchard LPN:    The following tubes were sent:    1 Lavendar, 0 Red, 2 SST, 0 Urine    Draw site right antecubital.  Patient tolerated draw with no distress.

## 2024-11-19 LAB
EST. AVERAGE GLUCOSE BLD GHB EST-MCNC: 103 MG/DL
HBA1C MFR BLD: 5.2 % (ref 4.2–5.6)

## 2024-11-20 LAB — CCP IGA+IGG SERPL IA-ACNC: 4 UNITS (ref 0–19)

## 2024-11-21 LAB
ANA SER QL: POSITIVE
DSDNA AB SER-ACNC: <1 IU/ML (ref 0–9)
ENA RNP AB SER-ACNC: 3.1 AI (ref 0–0.9)
ENA SM AB SER-ACNC: <0.2 AI (ref 0–0.9)
ENA SS-A AB SER-ACNC: <0.2 AI (ref 0–0.9)
ENA SS-B AB SER-ACNC: <0.2 AI (ref 0–0.9)
Lab: ABNORMAL

## 2024-12-04 ENCOUNTER — OFFICE VISIT (OUTPATIENT)
Age: 69
End: 2024-12-04
Payer: MEDICARE

## 2024-12-04 ENCOUNTER — TELEPHONE (OUTPATIENT)
Age: 69
End: 2024-12-04

## 2024-12-04 VITALS — HEIGHT: 64 IN | WEIGHT: 155.2 LBS | BODY MASS INDEX: 26.5 KG/M2

## 2024-12-04 DIAGNOSIS — M21.941 ACQUIRED DEFORMITY OF BOTH HANDS: ICD-10-CM

## 2024-12-04 DIAGNOSIS — R76.8 POSITIVE ANA (ANTINUCLEAR ANTIBODY): ICD-10-CM

## 2024-12-04 DIAGNOSIS — M18.11 PRIMARY OSTEOARTHRITIS OF FIRST CARPOMETACARPAL JOINT OF RIGHT HAND: Primary | ICD-10-CM

## 2024-12-04 DIAGNOSIS — M25.50 POLYARTHRALGIA: ICD-10-CM

## 2024-12-04 DIAGNOSIS — M19.042 ARTHRITIS OF BOTH HANDS: ICD-10-CM

## 2024-12-04 DIAGNOSIS — M21.942 ACQUIRED DEFORMITY OF BOTH HANDS: ICD-10-CM

## 2024-12-04 DIAGNOSIS — M18.12 PRIMARY OSTEOARTHRITIS OF FIRST CARPOMETACARPAL JOINT OF LEFT HAND: ICD-10-CM

## 2024-12-04 DIAGNOSIS — M19.041 ARTHRITIS OF BOTH HANDS: ICD-10-CM

## 2024-12-04 DIAGNOSIS — R76.8 ANTI-RNP ANTIBODIES PRESENT: ICD-10-CM

## 2024-12-04 PROCEDURE — 73130 X-RAY EXAM OF HAND: CPT

## 2024-12-04 PROCEDURE — 99214 OFFICE O/P EST MOD 30 MIN: CPT

## 2024-12-04 PROCEDURE — 1123F ACP DISCUSS/DSCN MKR DOCD: CPT

## 2024-12-04 PROCEDURE — 20600 DRAIN/INJ JOINT/BURSA W/O US: CPT

## 2024-12-04 RX ORDER — MELOXICAM 7.5 MG/1
7.5 TABLET ORAL DAILY PRN
Qty: 30 TABLET | Refills: 0 | Status: CANCELLED | OUTPATIENT
Start: 2024-12-04

## 2024-12-04 RX ORDER — LIDOCAINE HYDROCHLORIDE 10 MG/ML
1 INJECTION, SOLUTION INFILTRATION; PERINEURAL ONCE
Status: COMPLETED | OUTPATIENT
Start: 2024-12-04 | End: 2024-12-04

## 2024-12-04 RX ADMIN — LIDOCAINE HYDROCHLORIDE 1 ML: 10 INJECTION, SOLUTION INFILTRATION; PERINEURAL at 09:34

## 2024-12-04 NOTE — TELEPHONE ENCOUNTER
Patient states that [facility] doesn't take her insurance and she's requesting for a referral to be sent to a rheumatology office closer to her home in [place].    Please advise.    Patient can be reached at [phone].

## 2024-12-04 NOTE — TELEPHONE ENCOUNTER
The only other option for [facility] [place]. I will send the referral there.

## 2024-12-10 ENCOUNTER — OFFICE VISIT (OUTPATIENT)
Facility: CLINIC | Age: 69
End: 2024-12-10
Payer: MEDICARE

## 2024-12-10 VITALS
DIASTOLIC BLOOD PRESSURE: 56 MMHG | TEMPERATURE: 97.5 F | HEIGHT: 64 IN | SYSTOLIC BLOOD PRESSURE: 126 MMHG | WEIGHT: 151.4 LBS | RESPIRATION RATE: 18 BRPM | OXYGEN SATURATION: 98 % | BODY MASS INDEX: 25.85 KG/M2 | HEART RATE: 65 BPM

## 2024-12-10 DIAGNOSIS — R53.82 CHRONIC FATIGUE: ICD-10-CM

## 2024-12-10 DIAGNOSIS — Z87.11 HISTORY OF PEPTIC ULCER: ICD-10-CM

## 2024-12-10 DIAGNOSIS — M13.0 POLYARTHROPATHY: Primary | ICD-10-CM

## 2024-12-10 DIAGNOSIS — R10.12 LUQ PAIN: ICD-10-CM

## 2024-12-10 DIAGNOSIS — R76.8 ANTI-RNP ANTIBODIES PRESENT: ICD-10-CM

## 2024-12-10 DIAGNOSIS — E03.9 HYPOTHYROIDISM, UNSPECIFIED TYPE: ICD-10-CM

## 2024-12-10 DIAGNOSIS — R76.8 POSITIVE ANA (ANTINUCLEAR ANTIBODY): ICD-10-CM

## 2024-12-10 DIAGNOSIS — K21.9 GASTROESOPHAGEAL REFLUX DISEASE, UNSPECIFIED WHETHER ESOPHAGITIS PRESENT: ICD-10-CM

## 2024-12-10 PROCEDURE — 99214 OFFICE O/P EST MOD 30 MIN: CPT | Performed by: FAMILY MEDICINE

## 2024-12-10 PROCEDURE — 1123F ACP DISCUSS/DSCN MKR DOCD: CPT | Performed by: FAMILY MEDICINE

## 2024-12-10 RX ORDER — FAMOTIDINE 20 MG/1
20 TABLET, FILM COATED ORAL 2 TIMES DAILY
Qty: 30 TABLET | Refills: 2 | Status: SHIPPED | OUTPATIENT
Start: 2024-12-10

## 2024-12-10 SDOH — ECONOMIC STABILITY: INCOME INSECURITY: HOW HARD IS IT FOR YOU TO PAY FOR THE VERY BASICS LIKE FOOD, HOUSING, MEDICAL CARE, AND HEATING?: NOT VERY HARD

## 2024-12-10 SDOH — ECONOMIC STABILITY: FOOD INSECURITY: WITHIN THE PAST 12 MONTHS, YOU WORRIED THAT YOUR FOOD WOULD RUN OUT BEFORE YOU GOT MONEY TO BUY MORE.: NEVER TRUE

## 2024-12-10 SDOH — ECONOMIC STABILITY: FOOD INSECURITY: WITHIN THE PAST 12 MONTHS, THE FOOD YOU BOUGHT JUST DIDN'T LAST AND YOU DIDN'T HAVE MONEY TO GET MORE.: NEVER TRUE

## 2024-12-10 NOTE — PROGRESS NOTES
Chief Complaint   Patient presents with    Follow-up     2 week follow up       \"Have you been to the ER, urgent care clinic since your last visit?  Hospitalized since your last visit?\"    NO    “Have you seen or consulted any other health care providers outside our system since your last visit?”    NO          
did not go to the dr. valentine    Stroke Father 59    Heart Failure Father     Heart Surgery Sister 68    Coronary Art Dis Sister     Cancer Sister     Stroke Sister 65    Diabetes Other        Social History     Socioeconomic History    Marital status:      Spouse name: Not on file    Number of children: 4    Years of education: Not on file    Highest education level: 7th grade   Occupational History    Occupation: cleaning homes   Tobacco Use    Smoking status: Never    Smokeless tobacco: Never   Vaping Use    Vaping status: Never Used   Substance and Sexual Activity    Alcohol use: No    Drug use: Never    Sexual activity: Not Currently   Other Topics Concern    Not on file   Social History Narrative    Not on file     Social Determinants of Health     Financial Resource Strain: Low Risk  (12/10/2024)    Overall Financial Resource Strain (CARDIA)     Difficulty of Paying Living Expenses: Not very hard   Food Insecurity: No Food Insecurity (12/10/2024)    Hunger Vital Sign     Worried About Running Out of Food in the Last Year: Never true     Ran Out of Food in the Last Year: Never true   Transportation Needs: Unknown (12/10/2024)    PRAPARE - Transportation     Lack of Transportation (Medical): Not on file     Lack of Transportation (Non-Medical): No   Physical Activity: Inactive (2/27/2024)    Exercise Vital Sign     Days of Exercise per Week: 0 days     Minutes of Exercise per Session: 0 min   Stress: Not on file   Social Connections: Not on file   Intimate Partner Violence: Not on file   Housing Stability: Unknown (12/10/2024)    Housing Stability Vital Sign     Unable to Pay for Housing in the Last Year: Not on file     Number of Times Moved in the Last Year: Not on file     Homeless in the Last Year: No       Social History     Tobacco Use   Smoking Status Never   Smokeless Tobacco Never         Current Outpatient Medications:     CRANBERRY FRUIT PO, , Disp: , Rfl:     famotidine (PEPCID) 20 MG tablet,

## 2024-12-12 ENCOUNTER — TELEPHONE (OUTPATIENT)
Age: 69
End: 2024-12-12

## 2024-12-12 NOTE — TELEPHONE ENCOUNTER
Referral for LUQ pain,History of peptic ulcer and Gastroesophageal reflux disease, unspecified whether esophagitis present  . Please review and advise.    Referral  Referral # [id number]  Referral Information    Referral # Creation Date Referral Status Status Update    [id number] 12/10/2024 Open 12/10/2024: Status History     Status Reason Referral Type Referral Reasons Referral Class   none Eval and Treat Specialty Services Required Internal     To Specialty To Provider To Location/Place of Service To Department   Gastroenterology Bismark Adamson MD none [facility] - GASTROENTEROLOGY     To Vendor Referred By By Location/Place of Service By Department   none Temi Lincoln MD [facility] PRIMARY HR [facility]     Priority Start Date Expiration Date Referral Entered By   Routine 12/10/2024 12/10/2025 Temi Lincoln MD     Visits Requested Visits Authorized Visits Completed Visits Scheduled   1 1       Coverages    Payer Plan Auth. Required? Covered? Member # Authorized From Expires Auth # Precert. # Comment   UHC MEDICARE UHC AARP MEDICARE ADVANTAGE -- Covered [id number] 6/5/2023 -- -- -- --     Referral Information    Referral # Creation Date Referral Status Status Update    [id number] 12/10/2024 Open 12/10/2024: Status History     Status Reason Referral Type Referral Reasons Referral Class   none Eval and Treat Specialty Services Required Internal     To Specialty To Provider To Location/Place of Service To Department   Gastroenterology Bismark Adamson MD none HR [facility] - GASTROENTEROLOGY     To Vendor Referred By By Location/Place of Service By Department   none Temi Lincoln MD [facility] PRIMARY  [facility] INT MED     Priority Start Date Expiration Date Referral Entered By   Routine 12/10/2024 12/10/2025 Temi Lincoln MD     Visits Requested Visits Authorized Visits Completed Visits Scheduled   1 1       Procedure

## 2024-12-18 ENCOUNTER — TELEPHONE (OUTPATIENT)
Facility: CLINIC | Age: 69
End: 2024-12-18

## 2024-12-18 ENCOUNTER — TELEPHONE (OUTPATIENT)
Age: 69
End: 2024-12-18

## 2024-12-18 NOTE — TELEPHONE ENCOUNTER
Spoke w/pt and advised Dr. Lincoln didn't do the referral that the Orthopedic office did and she needs to contact that office to change and send all labs and notes. She asked for office#, I looked it up and gave to her. She said ok.

## 2024-12-18 NOTE — TELEPHONE ENCOUNTER
Patient called to request her Rheumatology referral be sent to Dary Castle NP at [facility]. The practice we originally referred her to does not accept her insurance, so she is requesting it be sent to them. They need the last three office notes and labs sent as well if possible.    Tel. [phone]  Fax: [phone]

## 2024-12-18 NOTE — TELEPHONE ENCOUNTER
Pt called - states the referral that was sent to rheumatology does not take her insurance. She was able to fine one that did -     Rheumatology   Dary Milner   Fax # [phone]   Telephone #[phone]  Referral office needs - 3 recent labs result visit & 3 office notes of recent visit faxed with referral.     Please call pt and advise her if referral been sent [phone]

## 2024-12-19 ENCOUNTER — SCHEDULED TELEPHONE ENCOUNTER (OUTPATIENT)
Age: 69
End: 2024-12-19

## 2024-12-19 DIAGNOSIS — R10.12 LEFT UPPER QUADRANT PAIN: ICD-10-CM

## 2024-12-19 DIAGNOSIS — Z87.11 HISTORY OF PEPTIC ULCER: ICD-10-CM

## 2024-12-20 PROBLEM — K21.9 GASTROESOPHAGEAL REFLUX DISEASE: Status: ACTIVE | Noted: 2024-12-19

## 2024-12-20 PROBLEM — R10.12 LEFT UPPER QUADRANT PAIN: Status: ACTIVE | Noted: 2024-12-19

## 2024-12-20 PROBLEM — Z87.11 HISTORY OF PEPTIC ULCER: Status: ACTIVE | Noted: 2024-12-19

## 2024-12-20 NOTE — PROGRESS NOTES
Patient scheduled for an  EGD 01/28/2025  Surgical Registration Form scanned.  Case Request Opened

## 2025-01-09 ENCOUNTER — OFFICE VISIT (OUTPATIENT)
Facility: CLINIC | Age: 70
End: 2025-01-09
Payer: MEDICARE

## 2025-01-09 VITALS
RESPIRATION RATE: 20 BRPM | WEIGHT: 155.2 LBS | HEART RATE: 70 BPM | BODY MASS INDEX: 26.5 KG/M2 | SYSTOLIC BLOOD PRESSURE: 128 MMHG | HEIGHT: 64 IN | OXYGEN SATURATION: 97 % | TEMPERATURE: 98 F | DIASTOLIC BLOOD PRESSURE: 64 MMHG

## 2025-01-09 DIAGNOSIS — K13.79 ORAL PAIN: ICD-10-CM

## 2025-01-09 DIAGNOSIS — J01.90 ACUTE BACTERIAL SINUSITIS: Primary | ICD-10-CM

## 2025-01-09 DIAGNOSIS — K13.79 MOUTH SORE: ICD-10-CM

## 2025-01-09 DIAGNOSIS — B96.89 ACUTE BACTERIAL SINUSITIS: Primary | ICD-10-CM

## 2025-01-09 PROCEDURE — 99213 OFFICE O/P EST LOW 20 MIN: CPT | Performed by: FAMILY MEDICINE

## 2025-01-09 PROCEDURE — 1123F ACP DISCUSS/DSCN MKR DOCD: CPT | Performed by: FAMILY MEDICINE

## 2025-01-09 RX ORDER — AZITHROMYCIN 250 MG/1
TABLET, FILM COATED ORAL
Qty: 6 TABLET | Refills: 0 | Status: SHIPPED | OUTPATIENT
Start: 2025-01-09 | End: 2025-01-19

## 2025-01-09 SDOH — ECONOMIC STABILITY: FOOD INSECURITY: WITHIN THE PAST 12 MONTHS, THE FOOD YOU BOUGHT JUST DIDN'T LAST AND YOU DIDN'T HAVE MONEY TO GET MORE.: NEVER TRUE

## 2025-01-09 SDOH — ECONOMIC STABILITY: FOOD INSECURITY: WITHIN THE PAST 12 MONTHS, YOU WORRIED THAT YOUR FOOD WOULD RUN OUT BEFORE YOU GOT MONEY TO BUY MORE.: NEVER TRUE

## 2025-01-09 ASSESSMENT — PATIENT HEALTH QUESTIONNAIRE - PHQ9
2. FEELING DOWN, DEPRESSED OR HOPELESS: NOT AT ALL
SUM OF ALL RESPONSES TO PHQ QUESTIONS 1-9: 0
SUM OF ALL RESPONSES TO PHQ9 QUESTIONS 1 & 2: 0
1. LITTLE INTEREST OR PLEASURE IN DOING THINGS: NOT AT ALL

## 2025-01-09 NOTE — PROGRESS NOTES
Indu Castro (: 1955) is a 69 y.o. female here for evaluation of the following chief concern(s):  Acute condition management     ASSESSMENT/PLAN:  1. Acute bacterial sinusitis  -     azithromycin (ZITHROMAX) 250 MG tablet; 500mg on day 1 followed by 250mg on days 2 - 5, Disp-6 tablet, R-0Normal  2. Mouth sore  -     Magic Mouthwash (MIRACLE MOUTHWASH); Swish and spit 5 mLs 4 times daily as needed for Irritation or Other (oral pain), Disp-120 mL, R-0Clarify SIG: One bottle w/ closest amount for QS ~1 week of treatment.  Thank you, PCR.Normal  3. Oral pain  -     Magic Mouthwash (MIRACLE MOUTHWASH); Swish and spit 5 mLs 4 times daily as needed for Irritation or Other (oral pain), Disp-120 mL, R-0Clarify SIG: One bottle w/ closest amount for QS ~1 week of treatment.  Thank you, PCR.Normal    Indu appears medically stable.  We discussed possibly viral infection, but she has a tendency to have more severe sinus complications so will prescribe antibiotic ppx.   We reviewed ER precautions.      We are scheduled for routine care on 3/3/25, or sooner if needed.      Indu Castro agrees with plan as above and has no additional questions at this time.       SUBJECTIVE/OBJECTIVE:    Fatigue/malaise.  Thick sinus drainage causing cough, sore throat.  Mild dyspnea.  Subjective fevers x4 days (thermometer battery is not working), chills.  Not able to work all week.    Hx allergy to Bactrim.    Upcoming GI eval; pt unsure exactly when.    Rheumatology eval on 25.  Pending consult w/ Sleep Medicine on 25.    TSH not completed; advised prior to 2025 visit  iFOBT; mailed, not processed yet    Past Medical History:   Diagnosis Date    Arthritis     Asthma     Brain tumor (benign) (HCC)     Cancer of skin, face     Endocrine disorder     Endometriosis     Heart disease (organic)     Hematuria     Hydronephrosis     Hypercholesteremia 10/17/2019    Kidney stones     Leaky heart valve     Menopause

## 2025-01-09 NOTE — PROGRESS NOTES
Chief Complaint   Patient presents with    Congestion    Cough   Started last friday sore throat, runny nose, cough, congestion with productive yellow phlem, headache. Unsure if fevers, no body aches. States all in chest, head and throat. Has been taking Mucinex.    \"Have you been to the ER, urgent care clinic since your last visit?  Hospitalized since your last visit?\"    NO    “Have you seen or consulted any other health care providers outside our system since your last visit?”    NO

## 2025-01-09 NOTE — PATIENT INSTRUCTIONS
[facility] Transportation Resources*  (Call [facility]/211 if need more resources.)    Senior Services of [facility]  What they offer: Senior Services [facility] [employer] Transit offers fixed routes, medical transportation, and on-demand response transit for those age 60+.  Accepts donations for regular service.  Fare: Approximately $4.00 each way  Phone Number: [phone]  Website: https://www.[employer]    [facility] Paratransit  What they offer: In compliance with the American Disabilities Act, [facility] Transit provides a shared ride, advanced reservation, origin to destination service for disabled individuals who are unable to use regular fixed route public transportation services because of their disabilities.   Phone Number: [phone]  Apply online: [email or web address].[email or web address]     Medicare Advantage Plans  Some plans may provide transportation. Members should contact the Member Services or Transportation number on the back of their insurance card for more information or to schedule transportation.    Medicaid Plans - [facility] ([facility] Plus     Each health plan has options for transportation services. Contact your insurance provider to schedule transportation. Transportation or Member Services contact information is listed on the back of the insurance card.         Insurance Contacts     o [employer] [facility]   Phone: 1-746.611.5289   Website:  https://www.[employer]/[place]    o [employer] [facility] Plus   Phone: 1-781.932.1967  Website: https://www.kites.io/vamedicaid    o [employer]   Phone: (415) 589-6793  Website: https://www.[employer]/members    o [facility] Health Plans   Phone: 1-762.936.4824 or 1-762.937.6434   Website: https://www.[employer]/communitycare    o [employer] Community Plan   Phone: 1-641.534.3718  Website: https://www.[facility].[employer]/[place]

## 2025-01-14 ENCOUNTER — ANESTHESIA EVENT (OUTPATIENT)
Age: 70
End: 2025-01-14
Payer: MEDICARE

## 2025-01-14 RX ORDER — SODIUM CHLORIDE 0.9 % (FLUSH) 0.9 %
5-40 SYRINGE (ML) INJECTION EVERY 12 HOURS SCHEDULED
Status: CANCELLED | OUTPATIENT
Start: 2025-01-14

## 2025-01-14 RX ORDER — SODIUM CHLORIDE 9 MG/ML
INJECTION, SOLUTION INTRAVENOUS PRN
Status: CANCELLED | OUTPATIENT
Start: 2025-01-14

## 2025-01-25 ENCOUNTER — PREP FOR PROCEDURE (OUTPATIENT)
Age: 70
End: 2025-01-25

## 2025-01-25 DIAGNOSIS — K21.9 GASTROESOPHAGEAL REFLUX DISEASE WITHOUT ESOPHAGITIS: ICD-10-CM

## 2025-01-25 DIAGNOSIS — R10.12 LEFT UPPER QUADRANT PAIN: Primary | ICD-10-CM

## 2025-01-25 DIAGNOSIS — Z87.11 HISTORY OF PEPTIC ULCER: ICD-10-CM

## 2025-01-25 RX ORDER — SODIUM CHLORIDE, SODIUM LACTATE, POTASSIUM CHLORIDE, CALCIUM CHLORIDE 600; 310; 30; 20 MG/100ML; MG/100ML; MG/100ML; MG/100ML
INJECTION, SOLUTION INTRAVENOUS CONTINUOUS
Status: CANCELLED | OUTPATIENT
Start: 2025-01-25

## 2025-01-25 NOTE — H&P
Open access updated H&P.      Brief history: The patient is female White (non-) 69 y.o. who was referred for left upper quadrant pain and esophageal reflux to be evaluated by EGD.  Review of the records showed that they had few if any health problems, excessive obesity, or significant medications that would require office evaluation prior to EGD for left upper quadrant pain and esophageal reflux.  After review of their chart, contact was made with the patient in discussion and literature sent regarding the procedure and the bowel preparation.  They are here now for their procedure.        Past medical and surgical history:   Past Medical History:   Diagnosis Date    Arthritis     Asthma     Brain tumor (benign) (HCC)     Cancer of skin, face     Endocrine disorder     Endometriosis     Heart disease (organic)     Hematuria     Hydronephrosis     Hypercholesteremia 10/17/2019    Kidney stones     Leaky heart valve     Menopause     Osteoarthritis     Osteoporosis     Pulmonary nodules     Thyroid disease     UTI (urinary tract infection)       Past Surgical History:   Procedure Laterality Date    APPENDECTOMY      BLADDER SUSPENSION      BREAST BIOPSY Left     Benign done [facility] w/ Dr NORRIS Newby    BREAST LUMPECTOMY      CHOLECYSTECTOMY      COLONOSCOPY N/A 9/24/2024    Colonoscopy performed by Bismark Adamson MD at [facility] ENDOSCOPY    HYSTERECTOMY (CERVIX STATUS UNKNOWN)      for endometriosis, VERONIKA at [facility] w/ Dr LILIANE Reyes    OVARY REMOVAL          Allergies:    Allergies   Allergen Reactions    Acetaminophen-Codeine Nausea And Vomiting    Amitriptyline Other (See Comments)    Meperidine Other (See Comments)    Sulfamethoxazole-Trimethoprim Hives    Trazodone Other (See Comments)     Opposite reaction- kept her up at night  Opposite reaction- kept her up at night      Meperidine Hcl Nausea And Vomiting    Oxycodone-Acetaminophen Nausea And Vomiting        Medications:  No current facility-administered medications for

## 2025-01-28 ENCOUNTER — TELEPHONE (OUTPATIENT)
Age: 70
End: 2025-01-28

## 2025-01-28 ENCOUNTER — HOSPITAL ENCOUNTER (OUTPATIENT)
Age: 70
Setting detail: OUTPATIENT SURGERY
Discharge: HOME | End: 2025-01-28
Attending: INTERNAL MEDICINE | Admitting: INTERNAL MEDICINE
Payer: MEDICARE

## 2025-01-28 ENCOUNTER — ANESTHESIA (OUTPATIENT)
Age: 70
End: 2025-01-28
Payer: MEDICARE

## 2025-01-28 VITALS
SYSTOLIC BLOOD PRESSURE: 124 MMHG | DIASTOLIC BLOOD PRESSURE: 64 MMHG | OXYGEN SATURATION: 96 % | HEART RATE: 79 BPM | BODY MASS INDEX: 26.43 KG/M2 | TEMPERATURE: 98.3 F | WEIGHT: 154 LBS | RESPIRATION RATE: 16 BRPM

## 2025-01-28 DIAGNOSIS — Z87.11 HISTORY OF PEPTIC ULCER: ICD-10-CM

## 2025-01-28 DIAGNOSIS — R10.12 LEFT UPPER QUADRANT PAIN: ICD-10-CM

## 2025-01-28 DIAGNOSIS — K21.9 GASTROESOPHAGEAL REFLUX DISEASE WITHOUT ESOPHAGITIS: ICD-10-CM

## 2025-01-28 PROCEDURE — 43235 EGD DIAGNOSTIC BRUSH WASH: CPT | Performed by: INTERNAL MEDICINE

## 2025-01-28 PROCEDURE — 6360000002 HC RX W HCPCS: Performed by: NURSE ANESTHETIST, CERTIFIED REGISTERED

## 2025-01-28 PROCEDURE — 3700000000 HC ANESTHESIA ATTENDED CARE: Performed by: INTERNAL MEDICINE

## 2025-01-28 PROCEDURE — 2709999900 HC NON-CHARGEABLE SUPPLY: Performed by: INTERNAL MEDICINE

## 2025-01-28 PROCEDURE — 2580000003 HC RX 258: Performed by: INTERNAL MEDICINE

## 2025-01-28 PROCEDURE — 7100000010 HC PHASE II RECOVERY - FIRST 15 MIN: Performed by: INTERNAL MEDICINE

## 2025-01-28 PROCEDURE — 7100000011 HC PHASE II RECOVERY - ADDTL 15 MIN: Performed by: INTERNAL MEDICINE

## 2025-01-28 PROCEDURE — 3600007501: Performed by: INTERNAL MEDICINE

## 2025-01-28 RX ORDER — SODIUM CHLORIDE 0.9 % (FLUSH) 0.9 %
5-40 SYRINGE (ML) INJECTION PRN
Status: DISCONTINUED | OUTPATIENT
Start: 2025-01-28 | End: 2025-01-28 | Stop reason: HOSPADM

## 2025-01-28 RX ORDER — SODIUM CHLORIDE, SODIUM LACTATE, POTASSIUM CHLORIDE, CALCIUM CHLORIDE 600; 310; 30; 20 MG/100ML; MG/100ML; MG/100ML; MG/100ML
INJECTION, SOLUTION INTRAVENOUS CONTINUOUS
Status: DISCONTINUED | OUTPATIENT
Start: 2025-01-28 | End: 2025-01-28 | Stop reason: HOSPADM

## 2025-01-28 RX ORDER — PROPOFOL 10 MG/ML
INJECTION, EMULSION INTRAVENOUS
Status: DISCONTINUED | OUTPATIENT
Start: 2025-01-28 | End: 2025-01-28 | Stop reason: SDUPTHER

## 2025-01-28 RX ADMIN — PROPOFOL 50 MG: 10 INJECTION, EMULSION INTRAVENOUS at 13:16

## 2025-01-28 RX ADMIN — SODIUM CHLORIDE, POTASSIUM CHLORIDE, SODIUM LACTATE AND CALCIUM CHLORIDE: 600; 310; 30; 20 INJECTION, SOLUTION INTRAVENOUS at 12:09

## 2025-01-28 RX ADMIN — PROPOFOL 100 MG: 10 INJECTION, EMULSION INTRAVENOUS at 13:15

## 2025-01-28 ASSESSMENT — PAIN - FUNCTIONAL ASSESSMENT
PAIN_FUNCTIONAL_ASSESSMENT: NONE - DENIES PAIN

## 2025-01-28 NOTE — ANESTHESIA PRE PROCEDURE
Department of Anesthesiology  Preprocedure Note       Name:  Indu Castro   Age:  69 y.o.  :  1955                                          MRN:  [id number]         Date:  2025      Surgeon: Surgeon(s):  Bismark Adamson MD    Procedure: Procedure(s):  EGD    Medications prior to admission:   Prior to Admission medications    Medication Sig Start Date End Date Taking? Authorizing Provider   CRANBERRY FRUIT PO    Yes Luci Newman MD   famotidine (PEPCID) 20 MG tablet Take 1 tablet by mouth 2 times daily 12/10/24  Yes Temi Lincoln MD   levothyroxine (SYNTHROID) 75 MCG tablet Take 1 tablet by mouth once daily 8/15/24  Yes Temi Lincoln MD   valACYclovir (VALTREX) 1 g tablet Take 1 tablet by mouth 2 times daily   Yes Luci Newman MD   atorvastatin (LIPITOR) 20 MG tablet Take 1 tablet by mouth daily 7/3/24  Yes Becky Marcos MD   diclofenac sodium (VOLTAREN) 1 % GEL Apply 2 g topically 3 times daily as needed (hand arthritis/pain) 24  Yes Temi Lincoln MD   Multiple Vitamins-Minerals (CENTRUM SILVER 50+WOMEN PO) Take by mouth daily   Yes ProviderLuci MD   Lactobacillus (PROBIOTIC ACIDOPHILUS PO) Take by mouth   Yes ProviderLuci MD   cetirizine (ZYRTEC) 10 MG tablet Take 1 tablet by mouth daily   Yes ProviderLuci MD   rizatriptan (MAXALT) 10 MG tablet TAKE 1 TABLET BY MOUTH AS DIRECTED AT ONSET OF HEADACHE -- MAY REPEAT IN 2 HOURS -- NO MORE THAN 2 PER DAY , 4 PER WEEK OR 10 PER MONTH 22  Yes ProviderLuci MD   acetaminophen (TYLENOL) 650 MG extended release tablet Take 2 tablets by mouth 2 times daily   Yes Automatic Reconciliation, Ar   estrogens conjugated (PREMARIN) 0.625 MG/GM CREA vaginal cream Place 0.5 g vaginally daily   Yes Automatic Reconciliation, Ar   gabapentin (NEURONTIN) 600 MG tablet Take 1 tablet by mouth daily.   Yes Automatic Reconciliation, Ar   omeprazole (PRILOSEC) 20 MG delayed release capsule Take 2

## 2025-01-28 NOTE — DISCHARGE INSTRUCTIONS
Recommendations:    1.  Stop famotidine.  Trial of increasing her omeprazole 40 mg a day for the next 2-3 months.  2.  Further recommendations pending clinical course as needed.  3.  Future considerations if she still has symptoms is trial of Carafate 2-4 times a day to bind bile salts.  If none of the above is effective, then I feel her symptoms are non-GI in nature.  4.  Antireflux precautions.  This includes waiting at least 3 hours after eating before lying down to prevent reflux.  5.  Follow up as needed.

## 2025-01-28 NOTE — INTERVAL H&P NOTE
Update History & Physical    The patient's History and Physical of January 28, 2025 was reviewed with the patient and I examined the patient. There was no change. The surgical site was confirmed by the patient and me.     Plan: The risks, benefits, expected outcome, and alternative to the recommended procedure have been discussed with the patient. Patient understands and wants to proceed with the procedure.     Electronically signed by Bismark Adamson MD on 1/28/2025 at 11:58 AM

## 2025-01-28 NOTE — OP NOTE
EGD procedure note    Date of procedure: 1/28/2025    Indication for procedure: Left upper quadrant pain, esophageal reflux    Type of procedure: Upper endoscopy     Anesthesia classification: ASA class 2    Patient history and physical has been accomplished and documented.    Patient is assessed and determined to be an appropriate candidate for planned procedure and sedation.  The patient was assessed immediately prior to sedation.    Sedation plan: MAC per anesthesia.    Surgical assistant: Not applicable    Airway assessment: Range of motion: normal, mouth opening: Normal, visual obstruction: No.    PREOP EXAM:  Unchanged.    VS: Reviewed  Gen: WDWN in NAD  CV: RRR, no murmur  Resp: CTAB  Abd: Soft, NTND, +BS  Extrem: No cyanosis or edema  Neuro: Awake and alert      Informed consent obtained: Yes.  The indications, risks, including but not limited to bleeding, perforation, infection, death, potential for failure to visualize or diagnose neoplasia, alternatives, benefits, discussed in detail with the patient prior to the procedure.  Patient understands and agrees to the procedure.  Patient identity and procedure were verified, consent was obtained, and is consistent with the consent form in the patient's records.    INSTRUMENT: Olympus upper endoscope    PROCEDURE FINDINGS:    OROPHARYNX: Normal.  The oropharynx had no evidence of erythema or edema.    ESOPHAGUS:  Esophageal mucosa normal with no masses noted in the proximal, mid, and distal esophagus.   No endoscopic features of eosinophilic esophagitis were noted.  The Z-line was at 38 cm. and was normal.    No hiatal hernia was noted distally.        STOMACH: Stomach was then evaluated including the cardia and fundus on retroflexion view.  Evaluation of the gastric body, antrum, and pylorus were normal, including normal gastric mucosa with no masses, ulcers, or mucosal abnormalities.    Retroflexion view of the cardia and fundus were normal.     DUODENUM:  The

## 2025-01-28 NOTE — TELEPHONE ENCOUNTER
This patient is having an EGD today.  However she was given a magnesium citrate colonoscopy prep and drank at all which caused her diarrhea and upset her stomach.  This was clearly unnecessary as she had colonoscopy 3 months ago and does not need a colonoscopy prep for EGD.    I apologized to the patient.  Please be more careful make sure people are not getting prep that needed and not those that do not.

## 2025-01-28 NOTE — ANESTHESIA POSTPROCEDURE EVALUATION
Department of Anesthesiology  Postprocedure Note    Patient: Indu Castro  MRN: [id number]  YOB: 1955  Date of evaluation: 1/28/2025    Procedure Summary       Date: 01/28/25 Room / Location: [facility] ENDO 01 / [facility] ENDOSCOPY    Anesthesia Start: 1312 Anesthesia Stop: 1320    Procedure: EGD (Upper GI Region) Diagnosis:       Left upper quadrant pain      History of peptic ulcer      Gastroesophageal reflux disease, unspecified whether esophagitis present      (Left upper quadrant pain [R10.12])      (History of peptic ulcer [Z87.11])      (Gastroesophageal reflux disease, unspecified whether esophagitis present [K21.9])    Surgeons: Bismark Adamson MD Responsible Provider: Florencia Javier APRN - CRNA    Anesthesia Type: MAC ASA Status: 3            Anesthesia Type: MAC    Chiara Phase I:      Chiara Phase II:      Anesthesia Post Evaluation    Level of consciousness: sleepy but conscious  Pain score: 0  Airway patency: patent  Nausea & Vomiting: no nausea and no vomiting  Cardiovascular status: blood pressure returned to baseline  Respiratory status: acceptable  Hydration status: euvolemic  Pain management: adequate    No notable events documented.

## 2025-02-05 ENCOUNTER — TELEPHONE (OUTPATIENT)
Age: 70
End: 2025-02-05

## 2025-02-05 DIAGNOSIS — R10.84 GENERALIZED ABDOMINAL PAIN: Primary | ICD-10-CM

## 2025-02-05 RX ORDER — DICYCLOMINE HYDROCHLORIDE 10 MG/1
10 CAPSULE ORAL 4 TIMES DAILY PRN
Qty: 120 CAPSULE | Refills: 3 | Status: SHIPPED | OUTPATIENT
Start: 2025-02-05

## 2025-02-05 NOTE — TELEPHONE ENCOUNTER
Patient contacted office stating the following:  She completed the colonoscopy prep last week, but was scheduled for an EGD.   Since then she has had belly pain, severe spasms in stomach and hurting in her back. She had diarrhea and vomiting after the procedure on 1/28/25. She was taking Miralax daily prior to the procedure and started it again on Wed., Jan. 29, 2025.      She didn't have another bowel movement until Monday, 2/3/25 after giving herself a fleets enema. She had a bowel movement and vomiting after the enema. Tuesday, 2/4/25, she had a regular bowel movement.    Today, Wed., 2/5/35, she is having some pain in stomach in the belly area, but it is not as bad as it has been since the 28th.   She states she also has a bile taste in her mouth that she cannot get rid of.    She wanted to know your thoughts about this. Please advise.

## 2025-02-10 DIAGNOSIS — E03.9 HYPOTHYROIDISM, UNSPECIFIED TYPE: ICD-10-CM

## 2025-02-11 RX ORDER — LEVOTHYROXINE SODIUM 75 UG/1
TABLET ORAL
Qty: 90 TABLET | Refills: 0 | Status: SHIPPED | OUTPATIENT
Start: 2025-02-11

## 2025-03-03 ENCOUNTER — TELEMEDICINE (OUTPATIENT)
Facility: CLINIC | Age: 70
End: 2025-03-03

## 2025-03-03 DIAGNOSIS — Z00.00 MEDICARE ANNUAL WELLNESS VISIT, SUBSEQUENT: Primary | ICD-10-CM

## 2025-03-03 DIAGNOSIS — K21.9 GASTROESOPHAGEAL REFLUX DISEASE WITHOUT ESOPHAGITIS: ICD-10-CM

## 2025-03-03 DIAGNOSIS — M13.0 POLYARTHROPATHY: ICD-10-CM

## 2025-03-03 DIAGNOSIS — D33.2 BENIGN NEOPLASM OF BRAIN, UNSPECIFIED BRAIN REGION (HCC): ICD-10-CM

## 2025-03-03 DIAGNOSIS — R53.82 CHRONIC FATIGUE: ICD-10-CM

## 2025-03-03 DIAGNOSIS — Z13.820 ENCOUNTER FOR OSTEOPOROSIS SCREENING IN ASYMPTOMATIC POSTMENOPAUSAL PATIENT: ICD-10-CM

## 2025-03-03 DIAGNOSIS — C44.00 SKIN CANCER OF LIP: Primary | ICD-10-CM

## 2025-03-03 DIAGNOSIS — R76.8 FALSE POSITIVE ANA: ICD-10-CM

## 2025-03-03 DIAGNOSIS — Z78.0 ENCOUNTER FOR OSTEOPOROSIS SCREENING IN ASYMPTOMATIC POSTMENOPAUSAL PATIENT: ICD-10-CM

## 2025-03-03 RX ORDER — FLUOROURACIL 50 MG/G
CREAM TOPICAL DAILY
COMMUNITY
Start: 2025-02-11

## 2025-03-03 ASSESSMENT — PATIENT HEALTH QUESTIONNAIRE - PHQ9
SUM OF ALL RESPONSES TO PHQ QUESTIONS 1-9: 0
2. FEELING DOWN, DEPRESSED OR HOPELESS: NOT AT ALL
SUM OF ALL RESPONSES TO PHQ QUESTIONS 1-9: 0
1. LITTLE INTEREST OR PLEASURE IN DOING THINGS: NOT AT ALL

## 2025-03-03 ASSESSMENT — LIFESTYLE VARIABLES: HOW OFTEN DO YOU HAVE A DRINK CONTAINING ALCOHOL: NEVER

## 2025-03-03 NOTE — PROGRESS NOTES
Patient wants a muscle relaxer ordered    Chief Complaint   Patient presents with    Follow-up     Chronic disease       \"Have you been to the ER, urgent care clinic since your last visit?  Hospitalized since your last visit?\"    NO    “Have you seen or consulted any other health care providers outside our system since your last visit?”    dermatology          
11/18/2024 02:00 PM    ALT 24 11/18/2024 02:00 PM    AST 19 11/18/2024 02:00 PM     Lab Results   Component Value Date/Time    CHOL 113 07/10/2024 07:14 AM    HDL 32 07/10/2024 07:14 AM    LDL 66 07/10/2024 07:14 AM    .4 12/01/2023 09:25 AM    VLDL 15 07/10/2024 07:14 AM       Total Time: minutes: 21-30 minutes    Indu Castro was evaluated through a synchronous (real-time) audio encounter. Patient identification was verified at the start of the visit. She (or guardian if applicable) is aware that this is a billable service, which includes applicable co-pays. This visit was conducted with the patient's (and/or legal guardian's) verbal consent. She has not had a related appointment within my department in the past 7 days or scheduled within the next 24 hours.   The patient was located at Home: [address] [place] [phone].  The provider was located at Facility (Appt Dept): [address] [place] [id number].  Confirm you are appropriately licensed, registered, or certified to deliver care in the state where the patient is located as indicated above. If you are not or unsure, please re-schedule the visit: Yes, I confirm.       Temi Lincoln MD   Family & Geriatric Medicine

## 2025-03-03 NOTE — PROGRESS NOTES
take care of any of the following: Laundry, housekeeping, banking/finances, shopping, telephone use, food preparation, transportation, or taking medications?: No    Health Maintenance reviewed and discussed and ordered per Provider.    Health Maintenance Due   Topic Date Due    Hepatitis C screen  Never done    DTaP/Tdap/Td vaccine (1 - Tdap) Never done    Shingles vaccine (1 of 2) Never done    Respiratory Syncytial Virus (RSV) Pregnant or age 60 yrs+ (1 - Risk 60-74 years 1-dose series) Never done    COVID-19 Vaccine (5 - 2024-25 season) 09/01/2024    Annual Wellness Visit (Medicare)  02/27/2025        Coordination of Care:    1. \"Have you been to the ER, urgent care clinic since your last visit?  Hospitalized since your last visit?\" No    2. \"Have you seen or consulted any other health care providers outside of the [facility] since your last visit?\" No       
   Not on file   Social History Narrative    Not on file     Social Determinants of Health     Financial Resource Strain: Low Risk  (12/10/2024)    Overall Financial Resource Strain (CARDIA)     Difficulty of Paying Living Expenses: Not very hard   Food Insecurity: No Food Insecurity (1/9/2025)    Hunger Vital Sign     Worried About Running Out of Food in the Last Year: Never true     Ran Out of Food in the Last Year: Never true   Transportation Needs: No Transportation Needs (1/9/2025)    PRAPARE - Transportation     Lack of Transportation (Medical): No     Lack of Transportation (Non-Medical): No   Physical Activity: Inactive (3/3/2025)    Exercise Vital Sign     Days of Exercise per Week: 0 days     Minutes of Exercise per Session: 0 min   Stress: Not on file   Social Connections: Not on file   Intimate Partner Violence: Not on file   Housing Stability: Low Risk  (1/9/2025)    Housing Stability Vital Sign     Unable to Pay for Housing in the Last Year: No     Number of Times Moved in the Last Year: 0     Homeless in the Last Year: No     Current Outpatient Medications   Medication Sig Dispense Refill    fluorouracil (EFUDEX) 5 % cream Apply topically daily Every day for a week and then off for a week x 3weeks      levothyroxine (SYNTHROID) 75 MCG tablet Take 1 tablet by mouth once daily 90 tablet 0    dicyclomine (BENTYL) 10 MG capsule Take 1 capsule by mouth 4 times daily as needed (As needed for abdominal pain) 120 capsule 3    Magic Mouthwash (MIRACLE MOUTHWASH) Swish and spit 5 mLs 4 times daily as needed for Irritation or Other (oral pain) 120 mL 0    CRANBERRY FRUIT PO       valACYclovir (VALTREX) 1 g tablet Take 1 tablet by mouth 2 times daily      atorvastatin (LIPITOR) 20 MG tablet Take 1 tablet by mouth daily 90 tablet 3    albuterol sulfate HFA (PROVENTIL HFA) 108 (90 Base) MCG/ACT inhaler Inhale 2 puffs into the lungs every 6 hours as needed for Wheezing 18 g 3    diclofenac sodium (VOLTAREN) 1 % GEL

## 2025-04-15 ENCOUNTER — OFFICE VISIT (OUTPATIENT)
Facility: CLINIC | Age: 70
End: 2025-04-15
Payer: MEDICARE

## 2025-04-15 ENCOUNTER — HOSPITAL ENCOUNTER (OUTPATIENT)
Age: 70
Setting detail: SPECIMEN
Discharge: HOME | End: 2025-04-18
Payer: MEDICARE

## 2025-04-15 VITALS
HEART RATE: 67 BPM | HEIGHT: 64 IN | SYSTOLIC BLOOD PRESSURE: 138 MMHG | RESPIRATION RATE: 18 BRPM | OXYGEN SATURATION: 97 % | WEIGHT: 151.6 LBS | DIASTOLIC BLOOD PRESSURE: 71 MMHG | TEMPERATURE: 97.2 F | BODY MASS INDEX: 25.88 KG/M2

## 2025-04-15 DIAGNOSIS — J32.9 CHRONIC RHINOSINUSITIS: ICD-10-CM

## 2025-04-15 DIAGNOSIS — E55.9 VITAMIN D DEFICIENCY: ICD-10-CM

## 2025-04-15 DIAGNOSIS — E03.9 HYPOTHYROIDISM, UNSPECIFIED TYPE: ICD-10-CM

## 2025-04-15 DIAGNOSIS — R53.83 OTHER FATIGUE: ICD-10-CM

## 2025-04-15 DIAGNOSIS — G47.00 INSOMNIA, UNSPECIFIED TYPE: ICD-10-CM

## 2025-04-15 DIAGNOSIS — M85.80 OSTEOPENIA, UNSPECIFIED LOCATION: ICD-10-CM

## 2025-04-15 DIAGNOSIS — H92.02 DISCOMFORT OF LEFT EAR: ICD-10-CM

## 2025-04-15 DIAGNOSIS — H60.542 DERMATITIS OF EAR CANAL, LEFT: Primary | ICD-10-CM

## 2025-04-15 LAB
ANION GAP SERPL CALC-SCNC: 7 MMOL/L (ref 3–18)
BUN SERPL-MCNC: 17 MG/DL (ref 7–18)
BUN/CREAT SERPL: 29 (ref 12–20)
CA-I BLD-MCNC: 9 MG/DL (ref 8.5–10.1)
CHLORIDE SERPL-SCNC: 109 MMOL/L (ref 100–111)
CO2 SERPL-SCNC: 29 MMOL/L (ref 21–32)
CREAT SERPL-MCNC: 0.59 MG/DL (ref 0.6–1.3)
GLUCOSE SERPL-MCNC: 88 MG/DL (ref 74–99)
POTASSIUM SERPL-SCNC: 3.9 MMOL/L (ref 3.5–5.5)
SODIUM SERPL-SCNC: 145 MMOL/L (ref 136–145)
TSH SERPL DL<=0.05 MIU/L-ACNC: 0.48 UIU/ML (ref 0.36–3.74)

## 2025-04-15 PROCEDURE — 84443 ASSAY THYROID STIM HORMONE: CPT

## 2025-04-15 PROCEDURE — 99214 OFFICE O/P EST MOD 30 MIN: CPT | Performed by: FAMILY MEDICINE

## 2025-04-15 PROCEDURE — 1123F ACP DISCUSS/DSCN MKR DOCD: CPT | Performed by: FAMILY MEDICINE

## 2025-04-15 PROCEDURE — 80048 BASIC METABOLIC PNL TOTAL CA: CPT

## 2025-04-15 PROCEDURE — 82306 VITAMIN D 25 HYDROXY: CPT

## 2025-04-15 RX ORDER — HYDROCORTISONE AND ACETIC ACID 20.75; 10.375 MG/ML; MG/ML
3 SOLUTION AURICULAR (OTIC) 2 TIMES DAILY
Qty: 10 ML | Refills: 0 | Status: SHIPPED | OUTPATIENT
Start: 2025-04-15 | End: 2025-04-22

## 2025-04-15 ASSESSMENT — PATIENT HEALTH QUESTIONNAIRE - PHQ9
SUM OF ALL RESPONSES TO PHQ QUESTIONS 1-9: 0
2. FEELING DOWN, DEPRESSED OR HOPELESS: NOT AT ALL
1. LITTLE INTEREST OR PLEASURE IN DOING THINGS: NOT AT ALL

## 2025-04-15 NOTE — PROGRESS NOTES
Indu Castro (: 1955) is a 69 y.o. female here for evaluation of the following chief concern(s):  Acute and chronic condition management     ASSESSMENT/PLAN:  1. Dermatitis of ear canal, left  -     acetic acid-hydrocortisone (VOSOL-HC) 1-2 % otic solution; Place 3 drops into the left ear 2 times daily for 7 days, Disp-10 mL, R-0Normal  2. Discomfort of left ear  -     External Referral To ENT  3. Chronic rhinosinusitis  -     External Referral To ENT  4. Hypothyroidism, unspecified type  -     Basic Metabolic Panel; Future  5. Insomnia, unspecified type  -     [facility] - Cabrera, Chichi, DO, Sleep Medicine ([address])  6. Other fatigue  -     [facility] - Cabrera, Chichi, DO, Sleep Medicine ([address])  7. Vitamin D deficiency  -     Vitamin D 25 Hydroxy; Future  8. Osteopenia, unspecified location  -     Vitamin D 25 Hydroxy; Future  -     Basic Metabolic Panel; Future    Ms. Castro appears medically stable.  Normotensive.     Refer back to ENT of pt's choice for continuity of care; she use to follow w/ An[facility] CADE- office closed.       Pt encouraged to assure continued follow-up w/ Dermatology.    Outstanding labs drawn today.    Pt encouraged to complete DEXA; printed order and scheduling number provided for support.      Return in about 6 weeks (around 2025).    Indu Castro agrees with plan as above and has no additional questions at this time.       SUBJECTIVE/OBJECTIVE:  Overall, pt is feeling \"good\".    Left ear w/ discomfort ~2 months.  Itching and brownish drainage.    Skin cancer NOS to lip treated w/ topical chemo, improved.  Another lesion has come up since that time.    She has an upcoming appt w/ Cardiology.    Hypothyroidism:   Full adherence w/ Levothyroxine 75mcg QD.    She had her tetanus vaccine up dated- especially w/ her line of work.  Persistent pain- neck/back/wrists/knees that limits her ability to work.  ;;;  Hx she saw Rheumatology but was advised her condition was no autoimmune but  Patient Specific Counseling (Will Not Stick From Patient To Patient): .\\n\\Norberto review of history with patient, it appears consistent with acute urticaria.  No lesions present of significance today that are not excoriated and scabbed over.  Discussed potential causes of hives, including multiple medications.  Will treat with antihistamines, and advised patient to call or take a photo of lesions when exacerbations occur.  Will follow up and evaluate if adding antihistamine will decrease outbreaks. Detail Level: Detailed Detail Level: Generalized

## 2025-04-15 NOTE — PROGRESS NOTES
Labs not drawn prior to visit, labs drawn now and patient would like her left ear looked at today/itchy and has crusty buildup on outside at times.. Patient aware that she will need to reschedule a follow up to discuss labs.    Chief Complaint   Patient presents with    Follow-up     Chronic disease       \"Have you been to the ER, urgent care clinic since your last visit?  Hospitalized since your last visit?\"    NO    “Have you seen or consulted any other health care providers outside our system since your last visit?”    NO      Verbal order from Temi Lincoln MD to order labs/sign and draw them in office    Labs were drawn and sent to [facility] by Sujata Blanchard LPN:    The following tubes were sent:    0 Lavendar, 0 Red, 2 SST, 0 Urine    Draw site left antecubital.  Patient tolerated draw with no distress.

## 2025-04-16 LAB — 25(OH)D3 SERPL-MCNC: 58.4 NG/ML (ref 30–100)

## 2025-05-05 ENCOUNTER — OFFICE VISIT (OUTPATIENT)
Age: 70
End: 2025-05-05
Payer: MEDICARE

## 2025-05-05 VITALS
OXYGEN SATURATION: 96 % | BODY MASS INDEX: 26.46 KG/M2 | WEIGHT: 155 LBS | DIASTOLIC BLOOD PRESSURE: 71 MMHG | HEART RATE: 60 BPM | HEIGHT: 64 IN | SYSTOLIC BLOOD PRESSURE: 117 MMHG

## 2025-05-05 DIAGNOSIS — I70.0 AORTIC ATHEROSCLEROSIS: ICD-10-CM

## 2025-05-05 DIAGNOSIS — I25.10 CORONARY ARTERY CALCIFICATION SEEN ON CAT SCAN: Primary | ICD-10-CM

## 2025-05-05 DIAGNOSIS — I34.0 NON-RHEUMATIC MITRAL REGURGITATION: ICD-10-CM

## 2025-05-05 DIAGNOSIS — E78.00 PURE HYPERCHOLESTEROLEMIA: ICD-10-CM

## 2025-05-05 PROBLEM — I20.89 ATYPICAL ANGINA: Status: RESOLVED | Noted: 2019-06-14 | Resolved: 2025-05-05

## 2025-05-05 PROCEDURE — 99214 OFFICE O/P EST MOD 30 MIN: CPT | Performed by: INTERNAL MEDICINE

## 2025-05-05 PROCEDURE — 1123F ACP DISCUSS/DSCN MKR DOCD: CPT | Performed by: INTERNAL MEDICINE

## 2025-05-05 ASSESSMENT — ENCOUNTER SYMPTOMS
EYES NEGATIVE: 1
SHORTNESS OF BREATH: 1
GASTROINTESTINAL NEGATIVE: 1

## 2025-05-05 ASSESSMENT — PATIENT HEALTH QUESTIONNAIRE - PHQ9
1. LITTLE INTEREST OR PLEASURE IN DOING THINGS: NOT AT ALL
SUM OF ALL RESPONSES TO PHQ QUESTIONS 1-9: 0
2. FEELING DOWN, DEPRESSED OR HOPELESS: NOT AT ALL
SUM OF ALL RESPONSES TO PHQ QUESTIONS 1-9: 0

## 2025-05-05 NOTE — PROGRESS NOTES
1. Have you been to the ER, urgent care clinic since your last visit?  Hospitalized since your last visit?     No      2.  Where do you normally have your labs drawn?   [facility]    3. Do you need any refills today?   No    4. Which local pharmacy do you use (enter pharmacy)?   [facility]    5. Which mail order pharmacy do you use (enter pharmacy)?   No     6. Are you here for surgical clearance and if so who will be doing your     procedure/surgery (care team)?   No      
   Osteoporosis     Pulmonary nodules     Thyroid disease     UTI (urinary tract infection)        Family History   Problem Relation Age of Onset    Other Mother         irregular heart beat    Breast Cancer Mother 58        and had gone to her brain, did not go to the dr. valentine    Stroke Father 59    Heart Failure Father     Heart Surgery Sister 68    Coronary Art Dis Sister     Cancer Sister     Stroke Sister 65    Diabetes Other        Social History     Tobacco Use    Smoking status: Never    Smokeless tobacco: Never   Vaping Use    Vaping status: Never Used   Substance Use Topics    Alcohol use: No    Drug use: Never        Current Outpatient Medications   Medication Sig Dispense Refill    fluorouracil (EFUDEX) 5 % cream Apply topically daily Every day for a week and then off for a week x 3weeks      levothyroxine (SYNTHROID) 75 MCG tablet Take 1 tablet by mouth once daily 90 tablet 0    dicyclomine (BENTYL) 10 MG capsule Take 1 capsule by mouth 4 times daily as needed (As needed for abdominal pain) 120 capsule 3    Magic Mouthwash (MIRACLE MOUTHWASH) Swish and spit 5 mLs 4 times daily as needed for Irritation or Other (oral pain) 120 mL 0    CRANBERRY FRUIT PO       valACYclovir (VALTREX) 1 g tablet Take 1 tablet by mouth 2 times daily      atorvastatin (LIPITOR) 20 MG tablet Take 1 tablet by mouth daily 90 tablet 3    albuterol sulfate HFA (PROVENTIL HFA) 108 (90 Base) MCG/ACT inhaler Inhale 2 puffs into the lungs every 6 hours as needed for Wheezing 18 g 3    diclofenac sodium (VOLTAREN) 1 % GEL Apply 2 g topically 3 times daily as needed (hand arthritis/pain) 100 g 1    Multiple Vitamins-Minerals (CENTRUM SILVER 50+WOMEN PO) Take by mouth daily      Lactobacillus (PROBIOTIC ACIDOPHILUS PO) Take by mouth      cetirizine (ZYRTEC) 10 MG tablet Take 1 tablet by mouth daily      rizatriptan (MAXALT) 10 MG tablet TAKE 1 TABLET BY MOUTH AS DIRECTED AT ONSET OF HEADACHE -- MAY REPEAT IN 2 HOURS -- NO MORE THAN 2 PER

## 2025-05-13 DIAGNOSIS — E03.9 HYPOTHYROIDISM, UNSPECIFIED TYPE: ICD-10-CM

## 2025-05-13 RX ORDER — LEVOTHYROXINE SODIUM 75 UG/1
75 TABLET ORAL DAILY
Qty: 90 TABLET | Refills: 0 | Status: SHIPPED | OUTPATIENT
Start: 2025-05-13

## 2025-05-20 ENCOUNTER — HOSPITAL ENCOUNTER (OUTPATIENT)
Age: 70
Discharge: HOME | End: 2025-05-23
Attending: FAMILY MEDICINE
Payer: MEDICARE

## 2025-05-20 DIAGNOSIS — Z13.820 ENCOUNTER FOR OSTEOPOROSIS SCREENING IN ASYMPTOMATIC POSTMENOPAUSAL PATIENT: ICD-10-CM

## 2025-05-20 DIAGNOSIS — Z78.0 ENCOUNTER FOR OSTEOPOROSIS SCREENING IN ASYMPTOMATIC POSTMENOPAUSAL PATIENT: ICD-10-CM

## 2025-05-20 PROCEDURE — 77080 DXA BONE DENSITY AXIAL: CPT

## 2025-05-28 ENCOUNTER — OFFICE VISIT (OUTPATIENT)
Facility: CLINIC | Age: 70
End: 2025-05-28
Payer: MEDICARE

## 2025-05-28 ENCOUNTER — HOSPITAL ENCOUNTER (OUTPATIENT)
Age: 70
Setting detail: SPECIMEN
Discharge: HOME | End: 2025-05-31
Payer: MEDICARE

## 2025-05-28 ENCOUNTER — RESULTS FOLLOW-UP (OUTPATIENT)
Facility: CLINIC | Age: 70
End: 2025-05-28

## 2025-05-28 VITALS
TEMPERATURE: 97.2 F | HEART RATE: 66 BPM | WEIGHT: 154.2 LBS | RESPIRATION RATE: 18 BRPM | SYSTOLIC BLOOD PRESSURE: 121 MMHG | HEIGHT: 64 IN | DIASTOLIC BLOOD PRESSURE: 73 MMHG | BODY MASS INDEX: 26.32 KG/M2 | OXYGEN SATURATION: 97 %

## 2025-05-28 DIAGNOSIS — E03.9 HYPOTHYROIDISM, UNSPECIFIED TYPE: ICD-10-CM

## 2025-05-28 DIAGNOSIS — R35.0 URINARY FREQUENCY: Primary | ICD-10-CM

## 2025-05-28 LAB
ANION GAP SERPL CALC-SCNC: 11 MMOL/L
BASOPHILS # BLD: 0.05 K/UL (ref 0–0.1)
BASOPHILS NFR BLD: 0.9 % (ref 0–2)
BILIRUBIN, URINE, POC: NEGATIVE
BLOOD URINE, POC: NEGATIVE
BUN SERPL-MCNC: 17 MG/DL (ref 6–23)
BUN/CREAT SERPL: 27
CA-I BLD-MCNC: 9.8 MG/DL (ref 8.5–10.1)
CHLORIDE SERPL-SCNC: 108 MMOL/L (ref 98–107)
CO2 SERPL-SCNC: 24 MMOL/L (ref 21–32)
CREAT SERPL-MCNC: 0.61 MG/DL (ref 0.6–1.3)
DIFFERENTIAL METHOD BLD: ABNORMAL
EOSINOPHIL # BLD: 0.21 K/UL (ref 0–0.4)
EOSINOPHIL NFR BLD: 3.6 % (ref 0–5)
ERYTHROCYTE [DISTWIDTH] IN BLOOD BY AUTOMATED COUNT: 11.9 % (ref 11.6–14.5)
GLUCOSE SERPL-MCNC: 90 MG/DL (ref 74–108)
GLUCOSE URINE, POC: NEGATIVE
HCT VFR BLD AUTO: 37.5 % (ref 35–45)
HGB BLD-MCNC: 12.5 G/DL (ref 12–16)
IMM GRANULOCYTES # BLD AUTO: 0.01 K/UL (ref 0–0.04)
IMM GRANULOCYTES NFR BLD AUTO: 0.2 % (ref 0–0.5)
KETONES, URINE, POC: NEGATIVE
LEUKOCYTE ESTERASE, URINE, POC: NEGATIVE
LYMPHOCYTES # BLD: 2.43 K/UL (ref 0.9–3.6)
LYMPHOCYTES NFR BLD: 41.3 % (ref 21–52)
MCH RBC QN AUTO: 31.3 PG (ref 24–34)
MCHC RBC AUTO-ENTMCNC: 33.3 G/DL (ref 31–37)
MCV RBC AUTO: 93.8 FL (ref 78–100)
MONOCYTES # BLD: 0.38 K/UL (ref 0.05–1.2)
MONOCYTES NFR BLD: 6.5 % (ref 3–10)
NEUTS SEG # BLD: 2.8 K/UL (ref 1.8–8)
NEUTS SEG NFR BLD: 47.5 % (ref 40–73)
NITRITE, URINE, POC: NEGATIVE
NRBC # BLD: 0 K/UL (ref 0–0.01)
NRBC BLD-RTO: 0 PER 100 WBC
PH, URINE, POC: 6.5 (ref 4.6–8)
PLATELET # BLD AUTO: 243 K/UL (ref 135–420)
PMV BLD AUTO: 9.9 FL (ref 9.2–11.8)
POTASSIUM SERPL-SCNC: 4.1 MMOL/L (ref 3.5–5.5)
PROTEIN,URINE, POC: NEGATIVE
RBC # BLD AUTO: 4 M/UL (ref 4.2–5.3)
SODIUM SERPL-SCNC: 142 MMOL/L (ref 136–145)
SPECIFIC GRAVITY, URINE, POC: 1.01 (ref 1–1.03)
TSH, 3RD GENERATION: 0.84 UIU/ML (ref 0.27–4.2)
URINALYSIS CLARITY, POC: CLEAR
URINALYSIS COLOR, POC: NORMAL
UROBILINOGEN, POC: NORMAL
WBC # BLD AUTO: 5.9 K/UL (ref 4.6–13.2)

## 2025-05-28 PROCEDURE — 84443 ASSAY THYROID STIM HORMONE: CPT

## 2025-05-28 PROCEDURE — 85025 COMPLETE CBC W/AUTO DIFF WBC: CPT

## 2025-05-28 PROCEDURE — 99213 OFFICE O/P EST LOW 20 MIN: CPT | Performed by: FAMILY MEDICINE

## 2025-05-28 PROCEDURE — 81003 URINALYSIS AUTO W/O SCOPE: CPT | Performed by: FAMILY MEDICINE

## 2025-05-28 PROCEDURE — 87086 URINE CULTURE/COLONY COUNT: CPT

## 2025-05-28 PROCEDURE — 80048 BASIC METABOLIC PNL TOTAL CA: CPT

## 2025-05-28 PROCEDURE — 1123F ACP DISCUSS/DSCN MKR DOCD: CPT | Performed by: FAMILY MEDICINE

## 2025-05-28 PROCEDURE — 36415 COLL VENOUS BLD VENIPUNCTURE: CPT | Performed by: FAMILY MEDICINE

## 2025-05-28 NOTE — PROGRESS NOTES
Indu Castro (: 1955) is a 69 y.o. female here for evaluation of the following chief concern(s):  Acute condition management     ASSESSMENT/PLAN:  1. Urinary frequency  -     AMB POC URINALYSIS DIP STICK AUTO W/O MICRO  -     Culture, Urine; Future  -     CBC with Auto Differential; Future  -     Basic Metabolic Panel; Future  -     COLLECTION VENOUS BLOOD,VENIPUNCTURE  2. Hypothyroidism, unspecified type  -     TSH; Future    Ms. Castro appears medically stable.    Normotensive.    She declines a script for Pyridium for discomfort.    Pt to keep hydrated and monitor symptoms.    Follow urine culture.    She can consider scheduling follow-up w/ Urology if her symptoms are not improving.  ER precautions.      We are scheduled for routine care follow-up on 25.      Indu Castro agrees with plan as above and has no additional questions at this time.       SUBJECTIVE/OBJECTIVE:    Lower abd pain and intermittent dysuria onset ~4 days ago.    After she thinks she is done, she is still dribbling on occasion; >1 month ago.  Occasional urge incontinence, worst at night; xmonths.  Right sided flank pain xweeks, she is unsure if this is related.    When she wipes she is seeing streaks of stools even though she has not had a BM.    No accidents of stools.    No constipation.    Nausea today.    She is using topical estrogen twice a week.    She has been feeling more cold recently, no fevers although she takes Acetaminophen daily.  Allergies to Sulfa medication.  Estimated Creatinine Clearance: 85 mL/min (A) (based on SCr of 0.59 mg/dL (L)).    The past four urine cultures showed no growth.  Hx intermittent trace-intact blood on UAs.  ;;;  Oct 2024 Urology visit;  \"Assessment:  UA today trace glucose  PVR today: 0 cc  1. OAB     2. Recurrent UTI  3. Hx of Bladder tack   4. Hx of Kidney stones 20 years ago  Plan:   Discussed bladder irritants, handout given  Advised stopping fluid intake 1-2 hours prior to bed,

## 2025-05-28 NOTE — PROGRESS NOTES
Indu VENKATESH Colon presents today for   Chief Complaint   Patient presents with    Urinary Frequency    Flank Pain       Is someone accompanying this pt? no    Is the patient using any DME equipment during OV? no    Depression Screenin/28/2025    11:52 AM 2025    10:19 AM 4/15/2025    10:11 AM 3/3/2025     9:24 AM 2025     4:05 PM 12/10/2024    11:28 AM 2024     2:31 PM   PHQ-9 Questionaire   Little interest or pleasure in doing things 0 0 0 0 0 0 0   Feeling down, depressed, or hopeless 0 0 0 0 0 0 0   PHQ-9 Total Score 0 0 0 0 0 0 0       Fall Risk      2025    11:52 AM 2025    10:19 AM 3/3/2025     9:24 AM 2024    10:17 AM 2023     8:20 AM 2023     8:56 AM   Fall Risk   Do you feel unsteady or are you worried about falling?  no no no no no no   2 or more falls in past year? no no no no no no   Fall with injury in past year? no no no no no no        Health Maintenance reviewed and discussed and ordered per Provider.    Health Maintenance Due   Topic Date Due    Hepatitis C screen  Never done    DTaP/Tdap/Td vaccine (1 - Tdap) Never done    Shingles vaccine (1 of 2) Never done    Respiratory Syncytial Virus (RSV) Pregnant or age 60 yrs+ (1 - Risk 60-74 years 1-dose series) Never done    COVID-19 Vaccine ( season) 2024   .      \"Have you been to the ER, urgent care clinic since your last visit?  Hospitalized since your last visit?\"    NO    “Have you seen or consulted any other health care providers outside our system since your last visit?”    NO        Click Here for Release of Records Request

## 2025-05-29 LAB
BACTERIA SPEC CULT: NORMAL
Lab: NORMAL

## 2025-06-05 ENCOUNTER — OFFICE VISIT (OUTPATIENT)
Facility: CLINIC | Age: 70
End: 2025-06-05
Payer: MEDICARE

## 2025-06-05 VITALS
OXYGEN SATURATION: 98 % | DIASTOLIC BLOOD PRESSURE: 54 MMHG | SYSTOLIC BLOOD PRESSURE: 104 MMHG | TEMPERATURE: 97.3 F | HEIGHT: 64 IN | WEIGHT: 153.4 LBS | BODY MASS INDEX: 26.19 KG/M2 | RESPIRATION RATE: 18 BRPM | HEART RATE: 63 BPM

## 2025-06-05 DIAGNOSIS — E03.9 HYPOTHYROIDISM, UNSPECIFIED TYPE: Primary | ICD-10-CM

## 2025-06-05 DIAGNOSIS — M13.0 POLYARTHROPATHY: ICD-10-CM

## 2025-06-05 DIAGNOSIS — G89.4 PAIN SYNDROME, CHRONIC: ICD-10-CM

## 2025-06-05 DIAGNOSIS — R35.0 URINARY FREQUENCY: ICD-10-CM

## 2025-06-05 PROCEDURE — 1123F ACP DISCUSS/DSCN MKR DOCD: CPT | Performed by: FAMILY MEDICINE

## 2025-06-05 PROCEDURE — 99214 OFFICE O/P EST MOD 30 MIN: CPT | Performed by: FAMILY MEDICINE

## 2025-06-05 ASSESSMENT — PATIENT HEALTH QUESTIONNAIRE - PHQ9
SUM OF ALL RESPONSES TO PHQ QUESTIONS 1-9: 0
2. FEELING DOWN, DEPRESSED OR HOPELESS: NOT AT ALL
SUM OF ALL RESPONSES TO PHQ QUESTIONS 1-9: 0
SUM OF ALL RESPONSES TO PHQ QUESTIONS 1-9: 0
1. LITTLE INTEREST OR PLEASURE IN DOING THINGS: NOT AT ALL
SUM OF ALL RESPONSES TO PHQ QUESTIONS 1-9: 0

## 2025-06-05 NOTE — PROGRESS NOTES
Indu Castro (: 1955) is a 69 y.o. female here for evaluation of the following chief concern(s):  Acute and chronic condition management     ASSESSMENT/PLAN:  1. Hypothyroidism, unspecified type  2. Urinary frequency  3. Pain syndrome, chronic  -     External Referral To Pain Clinic  4. Polyarthropathy  -     External Referral To Pain Clinic    Ms. Castro appears medically stable.    Normotensive.      Plan to re-check TSH in ~3 months.    Pt encouraged to maintain continuity w/ Neurology, Urology.     Pt aware to send portal message/call office if not contacted by Pain  schedulers within 1 week.     Otherwise, continue current care plan.      Return in about 3 months (around 2025) for or sooner if needed.    Indu Castro agrees with plan as above and has no additional questions at this time.       SUBJECTIVE/OBJECTIVE:    Her back is hurting, so she took the day off of work.  No trauma.    She is hoping to have a friend take her for injections.    Abd pain unchanged, she now feels symptoms are related to chronic back pain.  No new urinary symptoms.    ;;;  Lower abd pain and intermittent dysuria onset ~4 days ago.    After she thinks she is done, she is still dribbling on occasion; >1 month ago.  Occasional urge incontinence, worst at night; xmonths.  Right sided flank pain xweeks, she is unsure if this is related.    When she wipes she is seeing streaks of stools even though she has not had a BM.    No accidents of stools.    No constipation.    Nausea today.    She is using topical estrogen twice a week.    She has been feeling more cold recently, no fevers although she takes Acetaminophen daily.  Allergies to Sulfa medication.  Estimated Creatinine Clearance: 82 mL/min (based on SCr of 0.61 mg/dL).    The past four urine cultures showed no growth.  Hx intermittent trace-intact blood on UAs.  ;;;  Oct 2024 Urology visit;  \"Assessment:  UA today trace glucose  PVR today: 0 cc  1.

## 2025-06-20 ENCOUNTER — OFFICE VISIT (OUTPATIENT)
Facility: CLINIC | Age: 70
End: 2025-06-20

## 2025-06-20 VITALS
SYSTOLIC BLOOD PRESSURE: 105 MMHG | WEIGHT: 199 LBS | DIASTOLIC BLOOD PRESSURE: 67 MMHG | HEIGHT: 64 IN | HEART RATE: 61 BPM | BODY MASS INDEX: 33.97 KG/M2 | RESPIRATION RATE: 18 BRPM | OXYGEN SATURATION: 97 % | TEMPERATURE: 97.2 F

## 2025-06-20 DIAGNOSIS — E03.9 ACQUIRED HYPOTHYROIDISM: ICD-10-CM

## 2025-06-20 DIAGNOSIS — G47.09 OTHER INSOMNIA: ICD-10-CM

## 2025-06-20 DIAGNOSIS — G89.29 OTHER CHRONIC PAIN: ICD-10-CM

## 2025-06-20 DIAGNOSIS — Z76.89 ENCOUNTER TO ESTABLISH CARE: ICD-10-CM

## 2025-06-20 DIAGNOSIS — J45.20 MILD INTERMITTENT ASTHMA WITHOUT COMPLICATION: Primary | ICD-10-CM

## 2025-06-20 DIAGNOSIS — E78.00 PURE HYPERCHOLESTEROLEMIA: ICD-10-CM

## 2025-06-20 ASSESSMENT — PATIENT HEALTH QUESTIONNAIRE - PHQ9
SUM OF ALL RESPONSES TO PHQ QUESTIONS 1-9: 0
1. LITTLE INTEREST OR PLEASURE IN DOING THINGS: NOT AT ALL
2. FEELING DOWN, DEPRESSED OR HOPELESS: NOT AT ALL

## 2025-06-20 NOTE — PROGRESS NOTES
Indu Castro is a 69 y.o. female is seen on 6/20/2025 for Established New Doctor (Patient is here to establish care with provider. Former [facility] patient.), Arthritis, and Sleep Problem    Assessment & Plan  Mild intermittent asthma without complication   Chronic, at goal (stable), Continue Albuterol 90mcg/act 2 puffs every 4 hours prn wheezing, sob,       Pure hypercholesterolemia   Chronic, at goal (stable), Continue Atorvastatin 20mg once day. Heart healthy diet.       Acquired hypothyroidism   Chronic, at goal (stable), Continue Levothyroxin 75mccg once day. Repeat TSH in 3 months.       Other chronic pain   Chronic, not at goal (unstable), Has referral in to pain management.       Other insomnia   Chronic, not at goal- Discussed sleep hygiene. Consider sleep specialist.     Encounter to establish care  Medications reconciled and documented  Immunizations reviewed with patient  Recent labs and office note reviewed- see HPI    Tylenol prn pain/fever  ER cp, sob, syncope, severe pain  Return in about 4 months (around 10/20/2025), or if symptoms worsen or fail to improve.        Subjective:     HPI  Presents today to establish. Former patient of Dr. Lincoln. Office visit with Dr. Lincoln on 5/28/25- labs (cbc, cmp, lipid, TSH, urine culture)   History of Hypothyroidism- due for repeat TSH in 3 months  Hx chronic pain/arthritis- has pain management referral in. Has Diclofenac gel as needed  Asthma is mild and intermittent- doing well with albuterol inhaler only.     Only concern today is insomnia- not really interested in drug classes offered.     Previous PCP: Temi Lincoln M.D.   Reason for switching: Provider relocated    Social History     Socioeconomic History    Marital status:      Spouse name: None    Number of children: 4    Years of education: None    Highest education level: 7th grade   Occupational History    Occupation: cleaning [employer]   Tobacco Use    Smoking status: Never    Smokeless tobacco:

## 2025-06-20 NOTE — PROGRESS NOTES
Indu Castro presents today for   Chief Complaint   Patient presents with    Established New Doctor     Patient is here to establish care with provider. Former [facility] patient.    Arthritis    Sleep Problem       Is someone accompanying this pt? no    Is the patient using any DME equipment during OV? no    Health Maintenance Due   Topic Date Due    Hepatitis C screen  Never done    DTaP/Tdap/Td vaccine (1 - Tdap) Never done    Shingles vaccine (1 of 2) Never done    Respiratory Syncytial Virus (RSV) Pregnant or age 60 yrs+ (1 - Risk 60-74 years 1-dose series) Never done    COVID-19 Vaccine (5 - 2024-25 season) 09/01/2024    Lipids  07/10/2025         \"Have you been to the ER, urgent care clinic since your last visit?  Hospitalized since your last visit?\"    NO    “Have you seen or consulted any other health care providers outside our system since your last visit?”    NO

## 2025-06-29 PROBLEM — J45.909 ASTHMA: Status: ACTIVE | Noted: 2024-10-31

## 2025-07-01 RX ORDER — ATORVASTATIN CALCIUM 20 MG/1
20 TABLET, FILM COATED ORAL DAILY
Qty: 90 TABLET | Refills: 3 | Status: SHIPPED | OUTPATIENT
Start: 2025-07-01

## 2025-07-23 DIAGNOSIS — M25.561 RIGHT KNEE PAIN, UNSPECIFIED CHRONICITY: Primary | ICD-10-CM

## 2025-07-24 ENCOUNTER — OFFICE VISIT (OUTPATIENT)
Age: 70
End: 2025-07-24
Payer: MEDICARE

## 2025-07-24 ENCOUNTER — HOSPITAL ENCOUNTER (OUTPATIENT)
Age: 70
Discharge: HOME | End: 2025-07-27
Payer: MEDICARE

## 2025-07-24 DIAGNOSIS — M25.561 RIGHT KNEE PAIN, UNSPECIFIED CHRONICITY: Primary | ICD-10-CM

## 2025-07-24 DIAGNOSIS — M25.561 RIGHT KNEE PAIN, UNSPECIFIED CHRONICITY: ICD-10-CM

## 2025-07-24 DIAGNOSIS — M17.11 OSTEOARTHRITIS OF RIGHT KNEE, UNSPECIFIED OSTEOARTHRITIS TYPE: ICD-10-CM

## 2025-07-24 PROCEDURE — 20611 DRAIN/INJ JOINT/BURSA W/US: CPT | Performed by: ORTHOPAEDIC SURGERY

## 2025-07-24 PROCEDURE — 73562 X-RAY EXAM OF KNEE 3: CPT

## 2025-07-24 RX ORDER — TRIAMCINOLONE ACETONIDE 40 MG/ML
40 INJECTION, SUSPENSION INTRA-ARTICULAR; INTRAMUSCULAR ONCE
Status: COMPLETED | OUTPATIENT
Start: 2025-07-24 | End: 2025-07-24

## 2025-07-24 RX ORDER — LIDOCAINE HYDROCHLORIDE 10 MG/ML
9 INJECTION, SOLUTION INFILTRATION; PERINEURAL ONCE
Status: COMPLETED | OUTPATIENT
Start: 2025-07-24 | End: 2025-07-24

## 2025-07-24 RX ADMIN — LIDOCAINE HYDROCHLORIDE 9 ML: 10 INJECTION, SOLUTION INFILTRATION; PERINEURAL at 11:53

## 2025-07-24 RX ADMIN — TRIAMCINOLONE ACETONIDE 40 MG: 40 INJECTION, SUSPENSION INTRA-ARTICULAR; INTRAMUSCULAR at 11:53

## 2025-07-24 NOTE — PROGRESS NOTES
Name: Indu Castro    : 1955     [facility]  [address], [address]  [facility] [address]  Dept: [phone]  Dept Fax: [phone]   Chief Complaint   Patient presents with    Knee Pain     There were no vitals taken for this visit.   Allergies   Allergen Reactions    Acetaminophen-Codeine Nausea And Vomiting    Amitriptyline Other (See Comments)    Sulfamethoxazole-Trimethoprim Hives    Trazodone Other (See Comments)     Opposite reaction- kept her up at night  Opposite reaction- kept her up at night      Meperidine Hcl Nausea And Vomiting    Oxycodone-Acetaminophen Nausea And Vomiting     Current Outpatient Medications   Medication Sig Dispense Refill    atorvastatin (LIPITOR) 20 MG tablet Take 1 tablet by mouth once daily 90 tablet 3    levothyroxine (SYNTHROID) 75 MCG tablet Take 1 tablet by mouth once daily 90 tablet 0    dicyclomine (BENTYL) 10 MG capsule Take 1 capsule by mouth 4 times daily as needed (As needed for abdominal pain) 120 capsule 3    CRANBERRY FRUIT PO       valACYclovir (VALTREX) 1 g tablet Take 1 tablet by mouth 2 times daily as needed      albuterol sulfate HFA (PROVENTIL HFA) 108 (90 Base) MCG/ACT inhaler Inhale 2 puffs into the lungs every 6 hours as needed for Wheezing 18 g 3    diclofenac sodium (VOLTAREN) 1 % GEL Apply 2 g topically 3 times daily as needed (hand arthritis/pain) 100 g 1    Multiple Vitamins-Minerals (CENTRUM SILVER 50+WOMEN PO) Take by mouth daily      Lactobacillus (PROBIOTIC ACIDOPHILUS PO) Take by mouth      cetirizine (ZYRTEC) 10 MG tablet Take 1 tablet by mouth daily      rizatriptan (MAXALT) 10 MG tablet TAKE 1 TABLET BY MOUTH AS DIRECTED AT ONSET OF HEADACHE -- MAY REPEAT IN 2 HOURS -- NO MORE THAN 2 PER DAY , 4 PER WEEK OR 10 PER MONTH      acetaminophen (TYLENOL) 650 MG extended release tablet Take 2 tablets by mouth 2 times daily      estrogens conjugated

## 2025-07-30 ENCOUNTER — TELEPHONE (OUTPATIENT)
Facility: CLINIC | Age: 70
End: 2025-07-30

## 2025-07-30 DIAGNOSIS — G47.00 INSOMNIA, UNSPECIFIED TYPE: Primary | ICD-10-CM

## 2025-07-30 NOTE — TELEPHONE ENCOUNTER
Patient called in regards to being seen last month and discussing medication for sleep. She stated that since then she has been tried everything, but nothing seems to helping. Wanted to know if she could be prescribed a medication that will help with sleep. She stated that she has tried Trazodone, but that didn't work. If she can be prescribed a medication she would like for the prescription to be sent to [facility] in [place], [place].

## 2025-08-01 DIAGNOSIS — E03.9 HYPOTHYROIDISM, UNSPECIFIED TYPE: ICD-10-CM

## 2025-08-01 RX ORDER — DOXEPIN 6 MG/1
1 TABLET, FILM COATED ORAL NIGHTLY PRN
Qty: 30 TABLET | Refills: 0 | Status: SHIPPED | OUTPATIENT
Start: 2025-08-01

## 2025-08-02 RX ORDER — LEVOTHYROXINE SODIUM 75 UG/1
75 TABLET ORAL DAILY
Qty: 90 TABLET | Refills: 0 | Status: SHIPPED | OUTPATIENT
Start: 2025-08-02

## 2025-08-18 ENCOUNTER — TELEPHONE (OUTPATIENT)
Facility: CLINIC | Age: 70
End: 2025-08-18

## 2025-08-28 ENCOUNTER — OFFICE VISIT (OUTPATIENT)
Age: 70
End: 2025-08-28

## 2025-08-28 DIAGNOSIS — G89.29 CHRONIC PAIN OF LEFT KNEE: Primary | ICD-10-CM

## 2025-08-28 DIAGNOSIS — M25.562 CHRONIC PAIN OF LEFT KNEE: Primary | ICD-10-CM

## 2025-08-28 DIAGNOSIS — M17.11 OSTEOARTHRITIS OF RIGHT KNEE, UNSPECIFIED OSTEOARTHRITIS TYPE: ICD-10-CM

## 2025-08-28 DIAGNOSIS — M17.12 PRIMARY OSTEOARTHRITIS OF LEFT KNEE: ICD-10-CM

## 2025-08-28 DIAGNOSIS — E78.00 PURE HYPERCHOLESTEROLEMIA: ICD-10-CM

## 2025-08-28 DIAGNOSIS — J45.20 MILD INTERMITTENT ASTHMA WITHOUT COMPLICATION: ICD-10-CM

## 2025-08-28 RX ORDER — TRIAMCINOLONE ACETONIDE 40 MG/ML
40 INJECTION, SUSPENSION INTRA-ARTICULAR; INTRAMUSCULAR ONCE
Status: COMPLETED | OUTPATIENT
Start: 2025-08-28 | End: 2025-08-28

## 2025-08-28 RX ORDER — LIDOCAINE HYDROCHLORIDE 10 MG/ML
9 INJECTION, SOLUTION INFILTRATION; PERINEURAL ONCE
Status: COMPLETED | OUTPATIENT
Start: 2025-08-28 | End: 2025-08-28

## 2025-08-28 RX ADMIN — LIDOCAINE HYDROCHLORIDE 9 ML: 10 INJECTION, SOLUTION INFILTRATION; PERINEURAL at 16:49

## 2025-08-28 RX ADMIN — TRIAMCINOLONE ACETONIDE 40 MG: 40 INJECTION, SUSPENSION INTRA-ARTICULAR; INTRAMUSCULAR at 16:49

## 2025-09-04 ENCOUNTER — OFFICE VISIT (OUTPATIENT)
Age: 70
End: 2025-09-04

## 2025-09-04 VITALS — HEIGHT: 64 IN | BODY MASS INDEX: 25.44 KG/M2 | WEIGHT: 149 LBS

## 2025-09-04 DIAGNOSIS — M75.52 BURSITIS OF LEFT SHOULDER: ICD-10-CM

## 2025-09-04 DIAGNOSIS — M25.512 LEFT SHOULDER PAIN, UNSPECIFIED CHRONICITY: Primary | ICD-10-CM

## (undated) DEVICE — Device: Brand: DEFENDO VALVE AND CONNECTOR KIT

## (undated) DEVICE — KIT COLON W/ 1.1OZ LUB AND 2 END

## (undated) DEVICE — TUBING IRRIGATION BK FLO VLV FOR OFP ENDOSTAT ENDOGATOR DISP

## (undated) DEVICE — TUBING, SUCTION, 9/32" X 10', STRAIGHT: Brand: MEDLINE

## (undated) DEVICE — SYRINGE,TOOMEY,IRRIGATION,70CC,STERILE: Brand: MEDLINE

## (undated) DEVICE — TUBING INSUFFLATION CAP W/ EXT CARBON DIOX ENDO SMARTCAP

## (undated) DEVICE — SOLUTION IRRIG 500ML STRL H2O NONPYROGENIC

## (undated) DEVICE — SOLUTION IRRIG 1000ML STRL H2O USP PLAS POUR BTL

## (undated) DEVICE — CONMED SCOPE SAVER BITE BLOCK, 20X27 MM: Brand: SCOPE SAVER